# Patient Record
Sex: MALE | Race: WHITE | NOT HISPANIC OR LATINO | Employment: FULL TIME | URBAN - METROPOLITAN AREA
[De-identification: names, ages, dates, MRNs, and addresses within clinical notes are randomized per-mention and may not be internally consistent; named-entity substitution may affect disease eponyms.]

---

## 2017-02-24 ENCOUNTER — OFFICE VISIT (OUTPATIENT)
Dept: FAMILY MEDICINE | Facility: CLINIC | Age: 37
End: 2017-02-24
Payer: COMMERCIAL

## 2017-02-24 VITALS
DIASTOLIC BLOOD PRESSURE: 65 MMHG | WEIGHT: 219 LBS | BODY MASS INDEX: 28.11 KG/M2 | OXYGEN SATURATION: 98 % | TEMPERATURE: 98.2 F | SYSTOLIC BLOOD PRESSURE: 126 MMHG | HEIGHT: 74 IN

## 2017-02-24 DIAGNOSIS — E78.2 MIXED HYPERLIPIDEMIA: Primary | ICD-10-CM

## 2017-02-24 DIAGNOSIS — F41.8 DEPRESSION WITH ANXIETY: ICD-10-CM

## 2017-02-24 PROCEDURE — 80061 LIPID PANEL: CPT | Performed by: FAMILY MEDICINE

## 2017-02-24 PROCEDURE — 99213 OFFICE O/P EST LOW 20 MIN: CPT | Performed by: FAMILY MEDICINE

## 2017-02-24 PROCEDURE — 36415 COLL VENOUS BLD VENIPUNCTURE: CPT | Performed by: FAMILY MEDICINE

## 2017-02-24 PROCEDURE — 84460 ALANINE AMINO (ALT) (SGPT): CPT | Performed by: FAMILY MEDICINE

## 2017-02-24 RX ORDER — SERTRALINE HYDROCHLORIDE 100 MG/1
100 TABLET, FILM COATED ORAL DAILY
Qty: 90 TABLET | Refills: 3 | Status: SHIPPED | OUTPATIENT
Start: 2017-02-24 | End: 2018-03-25

## 2017-02-24 RX ORDER — GEMFIBROZIL 600 MG/1
TABLET, FILM COATED ORAL
Qty: 60 TABLET | Status: CANCELLED | OUTPATIENT
Start: 2017-02-24

## 2017-02-24 ASSESSMENT — ANXIETY QUESTIONNAIRES
7. FEELING AFRAID AS IF SOMETHING AWFUL MIGHT HAPPEN: NOT AT ALL
5. BEING SO RESTLESS THAT IT IS HARD TO SIT STILL: NOT AT ALL
6. BECOMING EASILY ANNOYED OR IRRITABLE: SEVERAL DAYS
2. NOT BEING ABLE TO STOP OR CONTROL WORRYING: SEVERAL DAYS
1. FEELING NERVOUS, ANXIOUS, OR ON EDGE: SEVERAL DAYS

## 2017-02-24 ASSESSMENT — PATIENT HEALTH QUESTIONNAIRE - PHQ9: 5. POOR APPETITE OR OVEREATING: SEVERAL DAYS

## 2017-02-24 NOTE — MR AVS SNAPSHOT
"              After Visit Summary   2/24/2017    Pool Ontiveros    MRN: [id number]           Patient Information     Date Of Birth          1980        Visit Information        Provider Department      2/24/2017 9:20 AM Paco Bonilla MD [facility] [place]        Today's Diagnoses     Mixed hyperlipidemia    -  1    Depression with anxiety           Follow-ups after your visit        Who to contact     If you have questions or need follow up information about today's clinic visit or your schedule please contact [facility] [place] directly at [phone].  Normal or non-critical lab and imaging results will be communicated to you by Sweet Credhart, letter or phone within 4 business days after the clinic has received the results. If you do not hear from us within 7 days, please contact the clinic through SiNode Systemst or phone. If you have a critical or abnormal lab result, we will notify you by phone as soon as possible.  Submit refill requests through ClickTale or call your pharmacy and they will forward the refill request to us. Please allow 3 business days for your refill to be completed.          Additional Information About Your Visit        MyChart Information     ClickTale gives you secure access to your electronic health record. If you see a primary care provider, you can also send messages to your care team and make appointments. If you have questions, please call your primary care clinic.  If you do not have a primary care provider, please call [phone] and they will assist you.        Care EveryWhere ID     This is your Care EveryWhere ID. This could be used by other organizations to access your [place] medical records  [id number]        Your Vitals Were     Temperature Height Pulse Oximetry BMI (Body Mass Index)          98.2  F (36.8  C) (Oral) 6' 2.11\" (1.882 m) 98% 28.03 kg/m2         Blood Pressure from Last 3 Encounters:   02/24/17 126/65   12/06/16 110/70   10/25/16 124/84    " Weight from Last 3 Encounters:   02/24/17 219 lb (99.3 kg)   12/06/16 215 lb (97.5 kg)   10/25/16 215 lb (97.5 kg)              We Performed the Following     ALT     Lipid Profile with reflex to direct LDL          Today's Medication Changes          These changes are accurate as of: 2/24/17  9:50 AM.  If you have any questions, ask your nurse or doctor.               These medicines have changed or have updated prescriptions.        Dose/Directions    sertraline 100 MG tablet   Commonly known as:  ZOLOFT   This may have changed:    - how much to take  - how to take this  - when to take this   Used for:  Depression with anxiety   Changed by:  Paco Bonilla MD        Dose:  100 mg   Take 1 tablet (100 mg) by mouth daily   Quantity:  90 tablet   Refills:  3            Where to get your medicines      These medications were sent to [facility] [id number] [place] [address] [address]     Phone:  [phone]     sertraline 100 MG tablet                Primary Care Provider    None Specified       No primary provider on file.        Thank you!     Thank you for choosing [facility]  for your care. Our goal is always to provide you with excellent care. Hearing back from our patients is one way we can continue to improve our services. Please take a few minutes to complete the written survey that you may receive in the mail after your visit with us. Thank you!             Your Updated Medication List - Protect others around you: Learn how to safely use, store and throw away your medicines at www.disposemymeds.org.          This list is accurate as of: 2/24/17  9:50 AM.  Always use your most recent med list.                   Brand Name Dispense Instructions for use    gemfibrozil 600 MG tablet    LOPID     Reported on 2/24/2017       sertraline 100 MG tablet    ZOLOFT    90 tablet    Take 1 tablet (100 mg) by mouth daily

## 2017-02-24 NOTE — PROGRESS NOTES
SUBJECTIVE:                                                    Pool Ontiveros is a 36 year old male who presents to clinic today for the following health issues:      Medication Followup of  Lopid, zoloft     Taking Medication as prescribed: NO hasn't taken lopid in 8 months     Side Effects:  None    Medication Helping Symptoms:  yes         Problem list and histories reviewed & adjusted, as indicated.  Additional history: as documented    Patient Active Problem List   Diagnosis     History of inguinal hernia repair     History of atrial septal defect repair     Mixed hyperlipidemia     Depression with anxiety     Past Surgical History   Procedure Laterality Date     Hernia repair         Social History   Substance Use Topics     Smoking status: Never Smoker     Smokeless tobacco: Current User     Types: Chew     Alcohol use Yes      Comment: 5 drinks per week     Family History   Problem Relation Age of Onset     Bipolar Disorder Mother      Depression Mother          Current Outpatient Prescriptions   Medication Sig Dispense Refill     sertraline (ZOLOFT) 100 MG tablet Take 1 tablet (100 mg) by mouth daily 90 tablet 3     gemfibrozil (LOPID) 600 MG tablet Reported on 2/24/2017       [DISCONTINUED] sertraline (ZOLOFT) 100 MG tablet        Allergies   Allergen Reactions     Vicodin [Hydrocodone-Acetaminophen] Hives     Recent Labs   Lab Test  07/19/16   0945  06/20/11   1106   LDL  92   --    HDL  59   --    TRIG  208*   --    ALT  29  27.0   CR  0.97  0.9   GFRESTIMATED  88   --    GFRESTBLACK  >90   GFR Calc     --    POTASSIUM  4.0  4.4      BP Readings from Last 3 Encounters:   02/24/17 126/65   12/06/16 110/70   10/25/16 124/84    Wt Readings from Last 3 Encounters:   02/24/17 219 lb (99.3 kg)   12/06/16 215 lb (97.5 kg)   10/25/16 215 lb (97.5 kg)                    ROS:  Constitutional, HEENT, cardiovascular, pulmonary, gi and gu systems are negative, except as otherwise  "noted.    OBJECTIVE:                                                    /65 (BP Location: Right arm, Cuff Size: Adult Regular)  Temp 98.2  F (36.8  C) (Oral)  Ht 6' 2.11\" (1.882 m)  Wt 219 lb (99.3 kg)  SpO2 98%  BMI 28.03 kg/m2  Body mass index is 28.03 kg/(m^2).  GENERAL: healthy, alert and no distress  NECK: no adenopathy, no asymmetry, masses, or scars and thyroid normal to palpation  RESP: lungs clear to auscultation - no rales, rhonchi or wheezes  CV: regular rate and rhythm, normal S1 S2, no S3 or S4, no murmur, click or rub, no peripheral edema and peripheral pulses strong  ABDOMEN: soft, nontender, no hepatosplenomegaly, no masses and bowel sounds normal  MS: no gross musculoskeletal defects noted, no edema         ASSESSMENT/PLAN:                                                    ASSESSMENT / PLAN:  (E78.2) Mixed hyperlipidemia  (primary encounter diagnosis)  Comment: has been off of lopid, not trying life style modification enough, will have him to recheck lab and consider if needed resume lopid. If at borderline between 150 and 250, will have him to decide meds vs life style modification only   Plan: Lipid Profile with reflex to direct LDL, ALT            (F41.8) Depression with anxiety  Comment: stable with current dose of meds, has no HI/SI  Plan: sertraline (ZOLOFT) 100 MG tablet        Will keep watching sx         FUTURE APPOINTMENTS:       - Follow-up visit in 1 year     Paco Bonilla MD  [facility]    "

## 2017-02-25 LAB
ALT SERPL W P-5'-P-CCNC: 28 U/L (ref 0–70)
CHOLEST SERPL-MCNC: 195 MG/DL
HDLC SERPL-MCNC: 43 MG/DL
LDLC SERPL CALC-MCNC: 99 MG/DL
NONHDLC SERPL-MCNC: 152 MG/DL
TRIGL SERPL-MCNC: 264 MG/DL

## 2017-02-25 ASSESSMENT — PATIENT HEALTH QUESTIONNAIRE - PHQ9: SUM OF ALL RESPONSES TO PHQ QUESTIONS 1-9: 5

## 2017-03-10 DIAGNOSIS — Z30.2 ENCOUNTER FOR STERILIZATION: ICD-10-CM

## 2017-03-10 LAB — SEMEN ANALYSIS P VAS PNL: NORMAL

## 2017-03-10 PROCEDURE — 89321 SEMEN ANAL SPERM DETECTION: CPT | Performed by: UROLOGY

## 2017-10-26 ENCOUNTER — OFFICE VISIT (OUTPATIENT)
Dept: FAMILY MEDICINE | Facility: CLINIC | Age: 37
End: 2017-10-26
Payer: COMMERCIAL

## 2017-10-26 VITALS
WEIGHT: 222 LBS | HEIGHT: 74 IN | HEART RATE: 78 BPM | RESPIRATION RATE: 18 BRPM | DIASTOLIC BLOOD PRESSURE: 76 MMHG | TEMPERATURE: 98.6 F | BODY MASS INDEX: 28.49 KG/M2 | OXYGEN SATURATION: 98 % | SYSTOLIC BLOOD PRESSURE: 118 MMHG

## 2017-10-26 DIAGNOSIS — J20.9 ACUTE BRONCHITIS, UNSPECIFIED ORGANISM: Primary | ICD-10-CM

## 2017-10-26 PROCEDURE — 99213 OFFICE O/P EST LOW 20 MIN: CPT | Performed by: PHYSICIAN ASSISTANT

## 2017-10-26 RX ORDER — AZITHROMYCIN 250 MG/1
TABLET, FILM COATED ORAL
Qty: 6 TABLET | Refills: 0 | Status: SHIPPED | OUTPATIENT
Start: 2017-10-26 | End: 2018-02-27

## 2017-10-26 RX ORDER — PREDNISONE 20 MG/1
20 TABLET ORAL 2 TIMES DAILY
Qty: 10 TABLET | Refills: 0 | Status: SHIPPED | OUTPATIENT
Start: 2017-10-26 | End: 2018-02-27

## 2017-10-26 ASSESSMENT — PATIENT HEALTH QUESTIONNAIRE - PHQ9
SUM OF ALL RESPONSES TO PHQ QUESTIONS 1-9: 2
5. POOR APPETITE OR OVEREATING: SEVERAL DAYS

## 2017-10-26 ASSESSMENT — ANXIETY QUESTIONNAIRES
2. NOT BEING ABLE TO STOP OR CONTROL WORRYING: NOT AT ALL
GAD7 TOTAL SCORE: 3
1. FEELING NERVOUS, ANXIOUS, OR ON EDGE: SEVERAL DAYS
7. FEELING AFRAID AS IF SOMETHING AWFUL MIGHT HAPPEN: NOT AT ALL
6. BECOMING EASILY ANNOYED OR IRRITABLE: SEVERAL DAYS
3. WORRYING TOO MUCH ABOUT DIFFERENT THINGS: NOT AT ALL
IF YOU CHECKED OFF ANY PROBLEMS ON THIS QUESTIONNAIRE, HOW DIFFICULT HAVE THESE PROBLEMS MADE IT FOR YOU TO DO YOUR WORK, TAKE CARE OF THINGS AT HOME, OR GET ALONG WITH OTHER PEOPLE: NOT DIFFICULT AT ALL
5. BEING SO RESTLESS THAT IT IS HARD TO SIT STILL: NOT AT ALL

## 2017-10-26 ASSESSMENT — PAIN SCALES - GENERAL: PAINLEVEL: NO PAIN (0)

## 2017-10-26 NOTE — PATIENT INSTRUCTIONS
Fill prescription for zithromax and prednisone If no improvement over the next 3-5 days  Return urgently if any change in symptoms like increasing cough, shortness of breath or other change in symptoms.

## 2017-10-26 NOTE — MR AVS SNAPSHOT
After Visit Summary   10/26/2017    Pool Ontiveros    MRN: [id number]           Patient Information     Date Of Birth          1980        Visit Information        Provider Department      10/26/2017 1:00 PM Valarie Tyler PA-C [facility]        Today's Diagnoses     Acute bronchitis, unspecified organism    -  1      Care Instructions    Fill prescription for zithromax and prednisone If no improvement over the next 3-5 days  Return urgently if any change in symptoms like increasing cough, shortness of breath or other change in symptoms.           Follow-ups after your visit        Who to contact     If you have questions or need follow up information about today's clinic visit or your schedule please contact [facility] directly at [phone].  Normal or non-critical lab and imaging results will be communicated to you by Brainwave Educationhart, letter or phone within 4 business days after the clinic has received the results. If you do not hear from us within 7 days, please contact the clinic through Brainwave Educationhart or phone. If you have a critical or abnormal lab result, we will notify you by phone as soon as possible.  Submit refill requests through News in Shorts or call your pharmacy and they will forward the refill request to us. Please allow 3 business days for your refill to be completed.          Additional Information About Your Visit        MyChart Information     News in Shorts gives you secure access to your electronic health record. If you see a primary care provider, you can also send messages to your care team and make appointments. If you have questions, please call your primary care clinic.  If you do not have a primary care provider, please call [phone] and they will assist you.        Care EveryWhere ID     This is your Care EveryWhere ID. This could be used by other organizations to access your [place] medical records  [id number]        Your Vitals Were      "Pulse Temperature Respirations Height Pulse Oximetry BMI (Body Mass Index)    78 98.6  F (37  C) (Oral) 18 1.886 m (6' 2.25\") 98% 28.31 kg/m2       Blood Pressure from Last 3 Encounters:   10/26/17 118/76   02/24/17 126/65   12/06/16 110/70    Weight from Last 3 Encounters:   10/26/17 100.7 kg (222 lb)   02/24/17 99.3 kg (219 lb)   12/06/16 97.5 kg (215 lb)              Today, you had the following     No orders found for display         Today's Medication Changes          These changes are accurate as of: 10/26/17  1:17 PM.  If you have any questions, ask your nurse or doctor.               Start taking these medicines.        Dose/Directions    azithromycin 250 MG tablet   Commonly known as:  ZITHROMAX   Used for:  Acute bronchitis, unspecified organism   Started by:  Valarie Tyler PA-C        Two tablets first day, then one tablet daily for four days.   Quantity:  6 tablet   Refills:  0       predniSONE 20 MG tablet   Commonly known as:  DELTASONE   Used for:  Acute bronchitis, unspecified organism   Started by:  Valarie Tyler PA-C        Dose:  20 mg   Take 1 tablet (20 mg) by mouth 2 times daily   Quantity:  10 tablet   Refills:  0            Where to get your medicines      These medications were sent to [facility] [address] [place] [address] [address]     Phone:  [phone]     azithromycin 250 MG tablet    predniSONE 20 MG tablet                Primary Care Provider Office Phone # Fax #    Paco Bonilla [phone]       [address] [facility] [address]        Equal Access to Services     [facility] Hospital[facility]ONA AH: Hadii kriss Coppola, waerrolda lusom, qaybta kaalmada ashley otero. So [facility] [phone].    ATENCIÓN: Si habla español, tiene a kelly disposición servicios gratuitos de asistencia lingüística. Llame al [phone].    We comply with applicable federal civil rights " laws and [place] laws. We do not discriminate on the basis of race, color, national origin, age, disability, sex, sexual orientation, or gender identity.            Thank you!     Thank you for choosing [facility]  for your care. Our goal is always to provide you with excellent care. Hearing back from our patients is one way we can continue to improve our services. Please take a few minutes to complete the written survey that you may receive in the mail after your visit with us. Thank you!             Your Updated Medication List - Protect others around you: Learn how to safely use, store and throw away your medicines at www.disposemymeds.org.          This list is accurate as of: 10/26/17  1:17 PM.  Always use your most recent med list.                   Brand Name Dispense Instructions for use Diagnosis    azithromycin 250 MG tablet    ZITHROMAX    6 tablet    Two tablets first day, then one tablet daily for four days.    Acute bronchitis, unspecified organism       gemfibrozil 600 MG tablet    LOPID     Reported on 2/24/2017        predniSONE 20 MG tablet    DELTASONE    10 tablet    Take 1 tablet (20 mg) by mouth 2 times daily    Acute bronchitis, unspecified organism       sertraline 100 MG tablet    ZOLOFT    90 tablet    Take 1 tablet (100 mg) by mouth daily    Depression with anxiety

## 2017-10-26 NOTE — NURSING NOTE
"Chief Complaint   Patient presents with     Cough       Initial /76 (BP Location: Right arm, Patient Position: Chair, Cuff Size: Adult Large)  Pulse 78  Temp 98.6  F (37  C) (Oral)  Resp 18  Ht 1.886 m (6' 2.25\")  Wt 100.7 kg (222 lb)  SpO2 98%  BMI 28.31 kg/m2 Estimated body mass index is 28.31 kg/(m^2) as calculated from the following:    Height as of this encounter: 1.886 m (6' 2.25\").    Weight as of this encounter: 100.7 kg (222 lb).  Medication Reconciliation: complete     Joy Kearns MA       "

## 2017-10-26 NOTE — PROGRESS NOTES
SUBJECTIVE:   Pool Ontiveros is a 37 year old male who presents to clinic today for the following health issues:    Acute Illness   Acute illness concerns: cough  Onset: 2 weeks    Fever: no    Chills/Sweats: no    Headache (location?): YES    Sinus Pressure:YES    Conjunctivitis:  no    Ear Pain: no    Rhinorrhea: no    Congestion: no    Sore Throat: no     Cough: YES-productive of yellow sputum    Wheeze: YES- at night    Decreased Appetite: no    Nausea: no    Vomiting: no    Diarrhea:  no    Dysuria/Freq.: no    Fatigue/Achiness: no    Sick/Strep Exposure: no     Therapies Tried and outcome: robitussin, cough drops          Reports feels fine but cough has persisted for 2 weeks. No fever. No shortness of breath.  Cough productive with yellow sputum. Wheezing at night.     Problem list and histories reviewed & adjusted, as indicated.  Additional history: as documented    Patient Active Problem List   Diagnosis     History of inguinal hernia repair     History of atrial septal defect repair     Mixed hyperlipidemia     Depression with anxiety     Past Surgical History:   Procedure Laterality Date     HERNIA REPAIR         Social History   Substance Use Topics     Smoking status: Never Smoker     Smokeless tobacco: Current User     Types: Chew     Alcohol use Yes      Comment: 5 drinks per week     Family History   Problem Relation Age of Onset     Bipolar Disorder Mother      Depression Mother          Current Outpatient Prescriptions   Medication Sig Dispense Refill                   sertraline (ZOLOFT) 100 MG tablet Take 1 tablet (100 mg) by mouth daily 90 tablet 3     gemfibrozil (LOPID) 600 MG tablet Reported on 2/24/2017           Reviewed and updated as needed this visit by clinical staff     Reviewed and updated as needed this visit by Provider         ROS:  Constitutional, HEENT, cardiovascular, pulmonary, gi and gu systems are negative, except as otherwise noted.      OBJECTIVE:   /76 (BP  "Location: Right arm, Patient Position: Chair, Cuff Size: Adult Large)  Pulse 78  Temp 98.6  F (37  C) (Oral)  Resp 18  Ht 1.886 m (6' 2.25\")  Wt 100.7 kg (222 lb)  SpO2 98%  BMI 28.31 kg/m2  Body mass index is 28.31 kg/(m^2).  GENERAL: healthy, alert and no distress  EYES: Eyes grossly normal to inspection, PERRL and conjunctivae and sclerae normal  HENT: ear canals and TM's normal, nose and mouth without ulcers or lesions  NECK: no adenopathy, no asymmetry, masses, or scars and thyroid normal to palpation  RESP: lungs clear to auscultation - no rales, rhonchi or wheezes  CV: regular rate and rhythm, normal S1 S2, no S3 or S4, no murmur, click or rub, no peripheral edema and peripheral pulses strong  MS: no gross musculoskeletal defects noted, no edema    Diagnostic Test Results:  none     ASSESSMENT/PLAN:             1. Acute bronchitis, unspecified organism    - azithromycin (ZITHROMAX) 250 MG tablet; Two tablets first day, then one tablet daily for four days.  Dispense: 6 tablet; Refill: 0  - predniSONE (DELTASONE) 20 MG tablet; Take 1 tablet (20 mg) by mouth 2 times daily  Dispense: 10 tablet; Refill: 0    Patient Instructions   Fill prescription for zithromax and prednisone If no improvement over the next 3-5 days  Return urgently if any change in symptoms like increasing cough, shortness of breath or other change in symptoms.        Valarie Tyler PA-C  [facility]  "

## 2017-10-27 ASSESSMENT — ANXIETY QUESTIONNAIRES: GAD7 TOTAL SCORE: 3

## 2017-11-06 ENCOUNTER — OFFICE VISIT (OUTPATIENT)
Dept: FAMILY MEDICINE | Facility: CLINIC | Age: 37
End: 2017-11-06
Payer: COMMERCIAL

## 2017-11-06 ENCOUNTER — RADIANT APPOINTMENT (OUTPATIENT)
Dept: GENERAL RADIOLOGY | Facility: CLINIC | Age: 37
End: 2017-11-06
Attending: NURSE PRACTITIONER
Payer: COMMERCIAL

## 2017-11-06 VITALS
TEMPERATURE: 97 F | BODY MASS INDEX: 28.36 KG/M2 | OXYGEN SATURATION: 100 % | WEIGHT: 222.4 LBS | SYSTOLIC BLOOD PRESSURE: 131 MMHG | DIASTOLIC BLOOD PRESSURE: 82 MMHG | HEART RATE: 74 BPM

## 2017-11-06 DIAGNOSIS — M79.641 PAIN OF RIGHT HAND: ICD-10-CM

## 2017-11-06 DIAGNOSIS — M79.641 PAIN OF RIGHT HAND: Primary | ICD-10-CM

## 2017-11-06 DIAGNOSIS — Z23 NEED FOR IMMUNIZATION AGAINST INFLUENZA: ICD-10-CM

## 2017-11-06 PROCEDURE — 99214 OFFICE O/P EST MOD 30 MIN: CPT | Performed by: NURSE PRACTITIONER

## 2017-11-06 PROCEDURE — 73130 X-RAY EXAM OF HAND: CPT | Mod: RT

## 2017-11-06 NOTE — PROGRESS NOTES
SUBJECTIVE:   Pool Ontiveros is a 37 year old male who presents to clinic today for the following health issues:        Musculoskeletal problem/pain      Duration: saturday    Description  Location: right hand    Intensity:  mild, moderate    Accompanying signs and symptoms: swelling    History  Previous similar problem: no   Previous evaluation:  none    Precipitating or alleviating factors:  Trauma or overuse: no   Aggravating factors include: typing, close fist    Therapies tried and outcome: nothing    [place] hunting on Saturday.  Swelling occurred while in bar after hunting.  Denies injury or bug bite.  Swelling was twice the size of what it is now.  Denies redness or warmth.  Endorses some pain still with movement of 3rd finger with typing.  ROM preserved.        Problem list and histories reviewed & adjusted, as indicated.  Additional history: as documented    Patient Active Problem List   Diagnosis     History of inguinal hernia repair     History of atrial septal defect repair     Mixed hyperlipidemia     Depression with anxiety     Past Surgical History:   Procedure Laterality Date     HERNIA REPAIR         Social History   Substance Use Topics     Smoking status: Never Smoker     Smokeless tobacco: Current User     Types: Chew     Alcohol use Yes      Comment: 5 drinks per week     Family History   Problem Relation Age of Onset     Bipolar Disorder Mother      Depression Mother          Current Outpatient Prescriptions   Medication Sig Dispense Refill     sertraline (ZOLOFT) 100 MG tablet Take 1 tablet (100 mg) by mouth daily 90 tablet 3     azithromycin (ZITHROMAX) 250 MG tablet Two tablets first day, then one tablet daily for four days. (Patient not taking: Reported on 11/6/2017) 6 tablet 0     predniSONE (DELTASONE) 20 MG tablet Take 1 tablet (20 mg) by mouth 2 times daily (Patient not taking: Reported on 11/6/2017) 10 tablet 0     gemfibrozil (LOPID) 600 MG tablet Reported on 2/24/2017        Allergies   Allergen Reactions     Vicodin [Hydrocodone-Acetaminophen] Hives         Reviewed and updated as needed this visit by clinical staff     Reviewed and updated as needed this visit by Provider         ROS:  C: NEGATIVE for fever, chills, change in weight  R: NEGATIVE for significant cough or SOB  CV: NEGATIVE for chest pain, palpitations or peripheral edema  MUSCULOSKELETAL: see above  NEURO: NEGATIVE for weakness or parasthesias    OBJECTIVE:     /82 (BP Location: Left arm, Patient Position: Sitting, Cuff Size: Adult Regular)  Pulse 74  Temp 97  F (36.1  C) (Tympanic)  Wt 222 lb 6.4 oz (100.9 kg)  SpO2 100%  BMI 28.36 kg/m2  Body mass index is 28.36 kg/(m^2).  GENERAL: healthy, alert and no distress  HENT: ear canals and TM's normal, nose and mouth without ulcers or lesions  RESP: lungs clear to auscultation - no rales, rhonchi or wheezes  CV: regular rate and rhythm, normal S1 S2, no S3 or S4, no murmur, click or rub, no peripheral edema and peripheral pulses strong  ABDOMEN: soft, nontender, no hepatosplenomegaly, no masses and bowel sounds normal  MS: R hand exam shows normal strength and muscle mass, no erythema, induration, or nodules, no evidence of joint effusion, ROM of all joints is normal and no evidence of joint instability; mild edema on dorsal aspect of hand, mild TTP over base of 2nd metatarsal.  Mild pain with extension of same digit.    Diagnostic Test Results:  No results found for this or any previous visit (from the past 24 hour(s)).  Xray - of right hand normal by my read    ASSESSMENT/PLAN:     1. Pain of right hand  Xray appear normal at visit, will wait final read. See patient instructions for further details.  - XR Hand Right G/E 3 Views; Future    2. Need for immunization against influenza  We were out of flu vaccine today.  Patient to return at his convenience.      Patient Instructions     For the hand:  Xrays look ok but I will have the radiologist take a look as  well.  Take Naproxen 500mg twice a day to decrease inflammation and swelling and help with pain.  Ice twice a day for 20 minutes at a time.  Can wear brace if this provides some comfort.  Return if worsening or not improving.    At [facility], we strive to deliver an exceptional experience to you, every time we see you.  If you receive a survey in the mail, please send us back your thoughts. We really do value your feedback.    Based on your medical history, these are the current health maintenance/preventive care services that you are due for (some may have been done at this visit.)  Health Maintenance Due   Topic Date Due     INFLUENZA VACCINE (SYSTEM ASSIGNED)  09/01/2017         Suggested websites for health information:  Www.Transphorm.org : Up to date and easily searchable information on multiple topics.  Www.medlineplus.gov : medication info, interactive tutorials, watch real surgeries online  Www.familydoctor.org : good info from the Academy of Family Physicians  Www.cdc.gov : public health info, travel advisories, epidemics (H1N1)  Www.aap.org : children's health info, normal development, vaccinations  Www.health.[facility].[place].us : [place] dept of health, public health issues in [place], N1N1    Your care team:                            Family Medicine Internal Medicine   MD Giles Gan MD Shantel Branch-Fleming, MD Katya Georgiev PA-C Nam Ho, MD Pediatrics   NEVAEH Yin, MD Natalia Sifuentes CNP, MD Deborah Mielke, MD Kim Thein, APRN CNP      Clinic hours: Monday - Thursday 7 am-7 pm; Fridays 7 am-5 pm.   Urgent care: Monday - Friday 11 am-9 pm; Saturday and Sunday 9 am-5 pm.  Pharmacy : Monday -Thursday 8 am-8 pm; Friday 8 am-6 pm; Saturday and Sunday 9 am-5 pm.     Clinic: [phone]   Pharmacy: [phone]      R.I.C.E.    R.I.C.E. stands for Rest, Ice, Compression, and Elevation. Doing these  things helps limit pain and swelling after an injury. R.I.C.E. also helps injuries heal faster. Use R.I.C.E. for sprains, strains, and severe bruises or bumps. Follow the tips on this handout and begin R.I.C.E. as soon as possible after an injury.  ? Rest  Pain is your body s way of telling you to rest an injured area. Whether you have hurt an elbow, hand, foot, or knee, limiting its use will prevent further injury and help you heal.  ? Ice  Applying ice right after an injury helps prevent swelling and reduce pain. Don t place ice directly on your skin.    Wrap a cold pack or bag of ice in a thin cloth. Place it over the injured area.    Ice for 10 minutes every 3 hours. Don t ice for more than 20 minutes at a time.  ? Compression  Putting pressure (compression) on an injury helps prevent swelling and provides support.    Wrap the injured area firmly with an elastic bandage. If your hand or foot tingles, becomes discolored, or feels cold to the touch, the bandage may be too tight. Rewrap it more loosely.    If your bandage becomes too loose, rewrap it.    Do not wear an elastic bandage overnight.  ? Elevation  Keeping an injury elevated helps reduce swelling, pain, and throbbing. Elevation is most effective when the injury is kept elevated higher than the heart.     Call your healthcare provider if you notice any of the following:    Fingers or toes feel numb, are cold to the touch, or change color    Skin looks shiny or tight    Pain, swelling, or bruising worsens and is not improved with elevation   Date Last Reviewed: 9/3/2015    8491-2717 [employer]. [address] [place] [address]. All rights reserved. This information is not intended as a substitute for professional medical care. Always follow your healthcare professional's instructions.            [facility]

## 2017-11-06 NOTE — PATIENT INSTRUCTIONS
For the hand:  Xrays look ok but I will have the radiologist take a look as well.  Take Naproxen 500mg twice a day to decrease inflammation and swelling and help with pain.  Ice twice a day for 20 minutes at a time.  Can wear brace if this provides some comfort.  Return if worsening or not improving.    At [facility], we strive to deliver an exceptional experience to you, every time we see you.  If you receive a survey in the mail, please send us back your thoughts. We really do value your feedback.    Based on your medical history, these are the current health maintenance/preventive care services that you are due for (some may have been done at this visit.)  Health Maintenance Due   Topic Date Due     INFLUENZA VACCINE (SYSTEM ASSIGNED)  09/01/2017         Suggested websites for health information:  Www.PreApps.AOL : Up to date and easily searchable information on multiple topics.  Www.[employer].gov : medication info, interactive tutorials, watch real surgeries online  Www.familydoctor.org : good info from the Academy of Family Physicians  Www.cdc.gov : public health info, travel advisories, epidemics (H1N1)  Www.aap.org : children's health info, normal development, vaccinations  Www.health.[facility].[place].us : [place] dept of health, public health issues in [place], N1N1    Your care team:                            Family Medicine Internal Medicine   MD Giles Gan MD Shantel Branch-Fleming, MD Katya Georgiev PA-C Nam Ho, MD Pediatrics   NEVAEH Yin, MD Natalia Sifuentes CNP, MD Deborah Mielke, MD Kim Thein, APRN CNP      Clinic hours: Monday - Thursday 7 am-7 pm; Fridays 7 am-5 pm.   Urgent care: Monday - Friday 11 am-9 pm; Saturday and Sunday 9 am-5 pm.  Pharmacy : Monday -Thursday 8 am-8 pm; Friday 8 am-6 pm; Saturday and Sunday 9 am-5 pm.     Clinic: [phone]   Pharmacy: [phone]      R.I.C.E.    R.I.C.E. stands for Rest, Ice, Compression, and Elevation. Doing these things helps limit pain and swelling after an injury. R.I.C.E. also helps injuries heal faster. Use R.I.C.E. for sprains, strains, and severe bruises or bumps. Follow the tips on this handout and begin R.I.C.E. as soon as possible after an injury.  ? Rest  Pain is your body s way of telling you to rest an injured area. Whether you have hurt an elbow, hand, foot, or knee, limiting its use will prevent further injury and help you heal.  ? Ice  Applying ice right after an injury helps prevent swelling and reduce pain. Don t place ice directly on your skin.    Wrap a cold pack or bag of ice in a thin cloth. Place it over the injured area.    Ice for 10 minutes every 3 hours. Don t ice for more than 20 minutes at a time.  ? Compression  Putting pressure (compression) on an injury helps prevent swelling and provides support.    Wrap the injured area firmly with an elastic bandage. If your hand or foot tingles, becomes discolored, or feels cold to the touch, the bandage may be too tight. Rewrap it more loosely.    If your bandage becomes too loose, rewrap it.    Do not wear an elastic bandage overnight.  ? Elevation  Keeping an injury elevated helps reduce swelling, pain, and throbbing. Elevation is most effective when the injury is kept elevated higher than the heart.     Call your healthcare provider if you notice any of the following:    Fingers or toes feel numb, are cold to the touch, or change color    Skin looks shiny or tight    Pain, swelling, or bruising worsens and is not improved with elevation   Date Last Reviewed: 9/3/2015    4914-5772 [employer]. [address] [place] [address], [place] [address]. All rights reserved. This information is not intended as a substitute for professional medical care. Always follow your healthcare professional's instructions.

## 2017-11-06 NOTE — NURSING NOTE
"Chief Complaint   Patient presents with     Musculoskeletal Problem      Right hand       Initial /82 (BP Location: Left arm, Patient Position: Sitting, Cuff Size: Adult Regular)  Pulse 74  Temp 97  F (36.1  C) (Tympanic)  Wt 222 lb 6.4 oz (100.9 kg)  SpO2 100%  BMI 28.36 kg/m2 Estimated body mass index is 28.36 kg/(m^2) as calculated from the following:    Height as of 10/26/17: 6' 2.25\" (1.886 m).    Weight as of this encounter: 222 lb 6.4 oz (100.9 kg).  Medication Reconciliation: complete   Parish MACIEL      "

## 2017-11-06 NOTE — MR AVS SNAPSHOT
After Visit Summary   11/6/2017    Pool Ontiveros    MRN: [id number]           Patient Information     Date Of Birth          1980        Visit Information        Provider Department      11/6/2017 11:20 AM Julienne Jiang APRN CNP [facility]        Today's Diagnoses     Pain of right hand    -  1      Care Instructions    For the hand:  Xrays look ok but I will have the radiologist take a look as well.  Take Naproxen 500mg twice a day to decrease inflammation and swelling and help with pain.  Ice twice a day for 20 minutes at a time.  Can wear brace if this provides some comfort.  Return if worsening or not improving.    At [facility], we strive to deliver an exceptional experience to you, every time we see you.  If you receive a survey in the mail, please send us back your thoughts. We really do value your feedback.    Based on your medical history, these are the current health maintenance/preventive care services that you are due for (some may have been done at this visit.)  Health Maintenance Due   Topic Date Due     INFLUENZA VACCINE (SYSTEM ASSIGNED)  09/01/2017         Suggested websites for health information:  Www.digedu.[employer] : Up to date and easily searchable information on multiple topics.  Www.medlineplus.gov : medication info, interactive tutorials, watch real surgeries online  Www.familydoctor.org : good info from the Academy of Family Physicians  Www.cdc.gov : public health info, travel advisories, epidemics (H1N1)  Www.aap.org : children's health info, normal development, vaccinations  Www.health.state[place].us : [place] dept of health, public health issues in [place], N1N1    Your care team:                            Family Medicine Internal Medicine   MD Giles Gan MD Shantel Branch-Fleming, MD Katya Georgiev PA-C Nam Ho, MD Pediatrics   NEVAEH Yin CNP Amelia Massimini APRN CNP    MD Natalia Brock MD Deborah Mielke, MD Kim Thein, APRN CNP      Clinic hours: Monday - Thursday 7 am-7 pm; Fridays 7 am-5 pm.   Urgent care: Monday - Friday 11 am-9 pm; Saturday and Sunday 9 am-5 pm.  Pharmacy : Monday -Thursday 8 am-8 pm; Friday 8 am-6 pm; Saturday and Sunday 9 am-5 pm.     Clinic: [phone]   Pharmacy: [phone]      R.I.C.E.    R.I.C.E. stands for Rest, Ice, Compression, and Elevation. Doing these things helps limit pain and swelling after an injury. R.I.C.E. also helps injuries heal faster. Use R.I.C.E. for sprains, strains, and severe bruises or bumps. Follow the tips on this handout and begin R.I.C.E. as soon as possible after an injury.  ? Rest  Pain is your body s way of telling you to rest an injured area. Whether you have hurt an elbow, hand, foot, or knee, limiting its use will prevent further injury and help you heal.  ? Ice  Applying ice right after an injury helps prevent swelling and reduce pain. Don t place ice directly on your skin.    Wrap a cold pack or bag of ice in a thin cloth. Place it over the injured area.    Ice for 10 minutes every 3 hours. Don t ice for more than 20 minutes at a time.  ? Compression  Putting pressure (compression) on an injury helps prevent swelling and provides support.    Wrap the injured area firmly with an elastic bandage. If your hand or foot tingles, becomes discolored, or feels cold to the touch, the bandage may be too tight. Rewrap it more loosely.    If your bandage becomes too loose, rewrap it.    Do not wear an elastic bandage overnight.  ? Elevation  Keeping an injury elevated helps reduce swelling, pain, and throbbing. Elevation is most effective when the injury is kept elevated higher than the heart.     Call your healthcare provider if you notice any of the following:    Fingers or toes feel numb, are cold to the touch, or change color    Skin looks shiny or tight    Pain, swelling, or bruising worsens and  is not improved with elevation   Date Last Reviewed: 9/3/2015    6316-3285 [employer]. [address] [place] [address], [place] [address]. All rights reserved. This information is not intended as a substitute for professional medical care. Always follow your healthcare professional's instructions.                Follow-ups after your visit        Who to contact     If you have questions or need follow up information about today's clinic visit or your schedule please contact [facility] directly at [phone].  Normal or non-critical lab and imaging results will be communicated to you by Jamnhart, letter or phone within 4 business days after the clinic has received the results. If you do not hear from us within 7 days, please contact the clinic through [employer] or phone. If you have a critical or abnormal lab result, we will notify you by phone as soon as possible.  Submit refill requests through [employer] or call your pharmacy and they will forward the refill request to us. Please allow 3 business days for your refill to be completed.          Additional Information About Your Visit        Jamnhart Information     [employer] gives you secure access to your electronic health record. If you see a primary care provider, you can also send messages to your care team and make appointments. If you have questions, please call your primary care clinic.  If you do not have a primary care provider, please call [phone] and they will assist you.        Care EveryWhere ID     This is your Care EveryWhere ID. This could be used by other organizations to access your [facility] medical records  [id number]        Your Vitals Were     Pulse Temperature Pulse Oximetry BMI (Body Mass Index)          74 97  F (36.1  C) (Tympanic) 100% 28.36 kg/m2         Blood Pressure from Last 3 Encounters:   11/06/17 131/82   10/26/17 118/76   02/24/17 126/65    Weight from Last 3 Encounters:   11/06/17 222 lb 6.4 oz (100.9 kg)    10/26/17 222 lb (100.7 kg)   02/24/17 219 lb (99.3 kg)               Primary Care Provider Office Phone # Fax #    [place] [phone]       [address] [facility] [address]        Equal Access to Services     YARA TREVER : Hadii aad ku hadasho Soomaali, waaxda luqadaha, qaybta kaalmada adeegyada, waxay idiin hayaan aderadha reyezlewiswaqas ladayanan ah. So [facility] [phone].    ATENCIÓN: Si habla español, tiene a kelly disposición servicios gratuitos de asistencia lingüística. Llame al [phone].    We comply with applicable federal civil rights laws and [place] laws. We do not discriminate on the basis of race, color, national origin, age, disability, sex, sexual orientation, or gender identity.            Thank you!     Thank you for choosing [facility]  for your care. Our goal is always to provide you with excellent care. Hearing back from our patients is one way we can continue to improve our services. Please take a few minutes to complete the written survey that you may receive in the mail after your visit with us. Thank you!             Your Updated Medication List - Protect others around you: Learn how to safely use, store and throw away your medicines at www.disposemymeds.org.          This list is accurate as of: 11/6/17 12:08 PM.  Always use your most recent med list.                   Brand Name Dispense Instructions for use Diagnosis    azithromycin 250 MG tablet    ZITHROMAX    6 tablet    Two tablets first day, then one tablet daily for four days.    Acute bronchitis, unspecified organism       gemfibrozil 600 MG tablet    LOPID     Reported on 2/24/2017        predniSONE 20 MG tablet    DELTASONE    10 tablet    Take 1 tablet (20 mg) by mouth 2 times daily    Acute bronchitis, unspecified organism       sertraline 100 MG tablet    ZOLOFT    90 tablet    Take 1 tablet (100 mg) by mouth daily    Depression with anxiety

## 2017-11-07 ENCOUNTER — TELEPHONE (OUTPATIENT)
Dept: FAMILY MEDICINE | Facility: CLINIC | Age: 37
End: 2017-11-07

## 2017-11-07 NOTE — TELEPHONE ENCOUNTER
This writer attempted to contact Pool on 11/07/17      Reason for call results and left detailed message.      If patient calls back:   Relay message [id number] is normal, (read verbatim), document that pt called and close encounter.        Parish Pabon, CMA

## 2018-02-27 ENCOUNTER — OFFICE VISIT (OUTPATIENT)
Dept: FAMILY MEDICINE | Facility: CLINIC | Age: 38
End: 2018-02-27
Payer: COMMERCIAL

## 2018-02-27 VITALS
SYSTOLIC BLOOD PRESSURE: 104 MMHG | OXYGEN SATURATION: 99 % | TEMPERATURE: 97.1 F | HEIGHT: 74 IN | BODY MASS INDEX: 27.46 KG/M2 | RESPIRATION RATE: 16 BRPM | WEIGHT: 214 LBS | DIASTOLIC BLOOD PRESSURE: 69 MMHG | HEART RATE: 69 BPM

## 2018-02-27 DIAGNOSIS — G47.33 OSA (OBSTRUCTIVE SLEEP APNEA): ICD-10-CM

## 2018-02-27 DIAGNOSIS — Z00.00 HEALTH CARE MAINTENANCE: Primary | ICD-10-CM

## 2018-02-27 LAB
ERYTHROCYTE [DISTWIDTH] IN BLOOD BY AUTOMATED COUNT: 12.8 % (ref 10–15)
HCT VFR BLD AUTO: 47.8 % (ref 40–53)
HGB BLD-MCNC: 15.7 G/DL (ref 13.3–17.7)
MCH RBC QN AUTO: 30.8 PG (ref 26.5–33)
MCHC RBC AUTO-ENTMCNC: 32.8 G/DL (ref 31.5–36.5)
MCV RBC AUTO: 94 FL (ref 78–100)
PLATELET # BLD AUTO: 188 10E9/L (ref 150–450)
RBC # BLD AUTO: 5.1 10E12/L (ref 4.4–5.9)
WBC # BLD AUTO: 4.8 10E9/L (ref 4–11)

## 2018-02-27 PROCEDURE — 99212 OFFICE O/P EST SF 10 MIN: CPT | Mod: 25 | Performed by: FAMILY MEDICINE

## 2018-02-27 PROCEDURE — 85027 COMPLETE CBC AUTOMATED: CPT | Performed by: FAMILY MEDICINE

## 2018-02-27 PROCEDURE — 99395 PREV VISIT EST AGE 18-39: CPT | Performed by: FAMILY MEDICINE

## 2018-02-27 PROCEDURE — 84443 ASSAY THYROID STIM HORMONE: CPT | Performed by: FAMILY MEDICINE

## 2018-02-27 PROCEDURE — 84460 ALANINE AMINO (ALT) (SGPT): CPT | Performed by: FAMILY MEDICINE

## 2018-02-27 PROCEDURE — 80061 LIPID PANEL: CPT | Performed by: FAMILY MEDICINE

## 2018-02-27 PROCEDURE — 36415 COLL VENOUS BLD VENIPUNCTURE: CPT | Performed by: FAMILY MEDICINE

## 2018-02-27 PROCEDURE — 80048 BASIC METABOLIC PNL TOTAL CA: CPT | Performed by: FAMILY MEDICINE

## 2018-02-27 NOTE — NURSING NOTE
"Chief Complaint   Patient presents with     Physical       Initial /69 (Cuff Size: Adult Large)  Pulse 69  Temp 97.1  F (36.2  C) (Tympanic)  Resp 16  Ht 6' 2.25\" (1.886 m)  Wt 214 lb (97.1 kg)  SpO2 99%  BMI 27.29 kg/m2 Estimated body mass index is 27.29 kg/(m^2) as calculated from the following:    Height as of this encounter: 6' 2.25\" (1.886 m).    Weight as of this encounter: 214 lb (97.1 kg).  Medication Reconciliation: complete   Janneth Thapa, CMA    "

## 2018-02-27 NOTE — MR AVS SNAPSHOT
After Visit Summary   2/27/2018    Pool Ontiveros    MRN: [id number]           Patient Information     Date Of Birth          1980        Visit Information        Provider Department      2/27/2018 10:20 AM Paco Bonilla MD [facility] [place]        Today's Diagnoses     Health care maintenance    -  1    TATA (obstructive sleep apnea)          Care Instructions      Preventive Health Recommendations  Male Ages 26 - 39    Yearly exam:             See your health care provider every year in order to  o   Review health changes.   o   Discuss preventive care.    o   Review your medicines if your doctor has prescribed any.    You should be tested each year for STDs (sexually transmitted diseases), if you re at risk.     After age 35, talk to your provider about cholesterol testing. If you are at risk for heart disease, have your cholesterol tested at least every 5 years.     If you are at risk for diabetes, you should have a diabetes test (fasting glucose).  Shots: Get a flu shot each year. Get a tetanus shot every 10 years.     Nutrition:    Eat at least 5 servings of fruits and vegetables daily.     Eat whole-grain bread, whole-wheat pasta and brown rice instead of white grains and rice.     Talk to your provider about Calcium and Vitamin D.     Lifestyle    Exercise for at least 150 minutes a week (30 minutes a day, 5 days a week). This will help you control your weight and prevent disease.     Limit alcohol to one drink per day.     No smoking.     Wear sunscreen to prevent skin cancer.     See your dentist every six months for an exam and cleaning.       Preventive Health Recommendations  Male Ages 26 - 39    Yearly exam:             See your health care provider every year in order to  o   Review health changes.   o   Discuss preventive care.    o   Review your medicines if your doctor has prescribed any.    You should be tested each year for STDs (sexually transmitted diseases),  if you re at risk.     After age 35, talk to your provider about cholesterol testing. If you are at risk for heart disease, have your cholesterol tested at least every 5 years.     If you are at risk for diabetes, you should have a diabetes test (fasting glucose).  Shots: Get a flu shot each year. Get a tetanus shot every 10 years.     Nutrition:    Eat at least 5 servings of fruits and vegetables daily.     Eat whole-grain bread, whole-wheat pasta and brown rice instead of white grains and rice.     Talk to your provider about Calcium and Vitamin D.     Lifestyle    Exercise for at least 150 minutes a week (30 minutes a day, 5 days a week). This will help you control your weight and prevent disease.     Limit alcohol to one drink per day.     No smoking.     Wear sunscreen to prevent skin cancer.     See your dentist every six months for an exam and cleaning.             Follow-ups after your visit        Additional Services     SLEEP EVALUATION & MANAGEMENT REFERRAL - [facility] [phone]  (Age 18 and up)       Please be aware that coverage of these services is subject to the terms and limitations of your health insurance plan.  Call member services at your health plan with any benefit or coverage questions.      Please bring the following to your appointment:    >>   List of current medications   >>   This referral request   >>   Any documents/labs given to you for this referral                      Follow-up notes from your care team     Return in about 1 year (around 2/27/2019) for Physical Exam.      Future tests that were ordered for you today     Open Future Orders        Priority Expected Expires Ordered    SLEEP EVALUATION & MANAGEMENT REFERRAL - [facility] [phone]  (Age 18 and up) Routine  2/27/2019 2/27/2018            Who to contact     If you have questions or need follow up information about today's clinic visit or your schedule  "please contact [facility] directly at [phone].  Normal or non-critical lab and imaging results will be communicated to you by MyChart, letter or phone within 4 business days after the clinic has received the results. If you do not hear from us within 7 days, please contact the clinic through [employer] or phone. If you have a critical or abnormal lab result, we will notify you by phone as soon as possible.  Submit refill requests through [employer] or call your pharmacy and they will forward the refill request to us. Please allow 3 business days for your refill to be completed.          Additional Information About Your Visit        [employer] Interactive Information     [employer] gives you secure access to your electronic health record. If you see a primary care provider, you can also send messages to your care team and make appointments. If you have questions, please call your primary care clinic.  If you do not have a primary care provider, please call [phone] and they will assist you.        Care EveryWhere ID     This is your Care EveryWhere ID. This could be used by other organizations to access your [place] medical records  [id number]        Your Vitals Were     Pulse Temperature Respirations Height Pulse Oximetry BMI (Body Mass Index)    69 97.1  F (36.2  C) (Tympanic) 16 6' 2.25\" (1.886 m) 99% 27.29 kg/m2       Blood Pressure from Last 3 Encounters:   02/27/18 104/69   11/06/17 131/82   10/26/17 118/76    Weight from Last 3 Encounters:   02/27/18 214 lb (97.1 kg)   11/06/17 222 lb 6.4 oz (100.9 kg)   10/26/17 222 lb (100.7 kg)              We Performed the Following     ALT     Basic metabolic panel  (Ca, Cl, CO2, Creat, Gluc, K, Na, BUN)     CBC with platelets     Lipid panel reflex to direct LDL Fasting     OFFICE/OUTPT VISIT,EST,LEVL II     TSH with free T4 reflex        Primary Care Provider Office Phone # Fax #    Paco Bonilla [phone]       [address] [place] [address] " [place] [address]        Equal Access to Services     [facility] : Hadii kriss bautista hadstefanijudy J Luisali, waerrolda luqadaha, qajeannineta kaalmamino otero, ashley dubon. So [facility] [phone].    ATENCIÓN: Si habla español, tiene a kelly disposición servicios gratuitos de asistencia lingüística. Llame al [phone].    We comply with applicable federal civil rights laws and [place] laws. We do not discriminate on the basis of race, color, national origin, age, disability, sex, sexual orientation, or gender identity.            Thank you!     Thank you for choosing [facility] [place]  for your care. Our goal is always to provide you with excellent care. Hearing back from our patients is one way we can continue to improve our services. Please take a few minutes to complete the written survey that you may receive in the mail after your visit with us. Thank you!             Your Updated Medication List - Protect others around you: Learn how to safely use, store and throw away your medicines at www.disposemymeds.org.          This list is accurate as of 2/27/18 10:43 AM.  Always use your most recent med list.                   Brand Name Dispense Instructions for use Diagnosis    gemfibrozil 600 MG tablet    LOPID     Reported on 2/24/2017        sertraline 100 MG tablet    ZOLOFT    90 tablet    Take 1 tablet (100 mg) by mouth daily    Depression with anxiety

## 2018-02-27 NOTE — PROGRESS NOTES
SUBJECTIVE:   CC: Pool Ontiveros is an 37 year old male who presents for preventative health visit.     Healthy Habits:    Do you get at least three servings of calcium containing foods daily (dairy, green leafy vegetables, etc.)? yes    Amount of exercise or daily activities, outside of work: 3 day(s) per week    Problems taking medications regularly No    Medication side effects: No    Have you had an eye exam in the past two years? no    Do you see a dentist twice per year? no    Do you have sleep apnea, excessive snoring or daytime drowsiness? Yes      Additional problems: fatigue, tingling in arms and leg     Today's PHQ-2 Score:   PHQ-2 ( 1999 Pfizer) 10/26/2017 2/24/2017   Q1: Little interest or pleasure in doing things 0 0   Q2: Feeling down, depressed or hopeless 0 0   PHQ-2 Score 0 0       Abuse: Current or Past(Physical, Sexual or Emotional)- No  Do you feel safe in your environment - Yes    Social History   Substance Use Topics     Smoking status: Never Smoker     Smokeless tobacco: Current User     Types: Chew     Alcohol use Yes      Comment: 5 drinks per week      If you drink alcohol do you typically have >3 drinks per day or >7 drinks per week? No                      Last PSA: No results found for: PSA    Reviewed orders with patient. Reviewed health maintenance and updated orders accordingly - Yes  BP Readings from Last 3 Encounters:   02/27/18 104/69   11/06/17 131/82   10/26/17 118/76    Wt Readings from Last 3 Encounters:   02/27/18 214 lb (97.1 kg)   11/06/17 222 lb 6.4 oz (100.9 kg)   10/26/17 222 lb (100.7 kg)                  Patient Active Problem List   Diagnosis     History of inguinal hernia repair     History of atrial septal defect repair     Mixed hyperlipidemia     Depression with anxiety     Past Surgical History:   Procedure Laterality Date     HERNIA REPAIR         Social History   Substance Use Topics     Smoking status: Never Smoker     Smokeless tobacco: Current User      Types: Chew      Comment: Gum, ocasionally      Alcohol use Yes      Comment: 5 drinks per week     Family History   Problem Relation Age of Onset     Bipolar Disorder Mother      Depression Mother          Current Outpatient Prescriptions   Medication Sig Dispense Refill     sertraline (ZOLOFT) 100 MG tablet Take 1 tablet (100 mg) by mouth daily 90 tablet 3     gemfibrozil (LOPID) 600 MG tablet Reported on 2/24/2017       Allergies   Allergen Reactions     Vicodin [Hydrocodone-Acetaminophen] Hives     Recent Labs   Lab Test  02/24/17   0947  07/19/16   0945  06/20/11   1106   LDL  99  92   --    HDL  43  59   --    TRIG  264*  208*   --    ALT  28  29  27.0   CR   --   0.97  0.9   GFRESTIMATED   --   88   --    GFRESTBLACK   --   >90   GFR Calc     --    POTASSIUM   --   4.0  4.4        Reviewed and updated as needed this visit by clinical staff         Reviewed and updated as needed this visit by Provider        Past Medical History:   Diagnosis Date     Depression with anxiety 7/19/2016     Hernia, abdominal      History of atrial septal defect repair 3/5/2012    Overview:  repaired age 5      History of inguinal hernia repair 3/5/2012    Overview:  Age 6      Mixed hyperlipidemia 7/19/2016      Past Surgical History:   Procedure Laterality Date     HERNIA REPAIR         ROS:  C: NEGATIVE for fever, chills, change in weight  I: NEGATIVE for worrisome rashes, moles or lesions  E: NEGATIVE for vision changes or irritation  ENT: NEGATIVE for ear, mouth and throat problems  R: NEGATIVE for significant cough or SOB  CV: NEGATIVE for chest pain, palpitations or peripheral edema  GI: NEGATIVE for nausea, abdominal pain, heartburn, or change in bowel habits   male: negative for dysuria, hematuria, decreased urinary stream, erectile dysfunction, urethral discharge  M: NEGATIVE for significant arthralgias or myalgia  N: NEGATIVE for weakness, dizziness or paresthesias  P: NEGATIVE for changes in mood  "or affect    OBJECTIVE:   /69 (Cuff Size: Adult Large)  Pulse 69  Temp 97.1  F (36.2  C) (Tympanic)  Resp 16  Ht 6' 2.25\" (1.886 m)  Wt 214 lb (97.1 kg)  SpO2 99%  BMI 27.29 kg/m2  EXAM:  GENERAL: healthy, alert and no distress  EYES: Eyes grossly normal to inspection, PERRL and conjunctivae and sclerae normal  HENT: ear canals and TM's normal, nose and mouth without ulcers or lesions  NECK: no adenopathy, no asymmetry, masses, or scars and thyroid normal to palpation  RESP: lungs clear to auscultation - no rales, rhonchi or wheezes  CV: regular rate and rhythm, normal S1 S2, no S3 or S4, no murmur, click or rub, no peripheral edema and peripheral pulses strong  ABDOMEN: soft, nontender, no hepatosplenomegaly, no masses and bowel sounds normal   (male): normal male genitalia without lesions or urethral discharge, no hernia  MS: no gross musculoskeletal defects noted, no edema  SKIN: no suspicious lesions or rashes  NEURO: Normal strength and tone, mentation intact and speech normal  BACK: no CVA tenderness, no paralumbar tenderness  PSYCH: mentation appears normal, affect normal/bright  LYMPH: no cervical, supraclavicular, axillary, or inguinal adenopathy    ASSESSMENT/PLAN:   1. Health care maintenance    - CBC with platelets  - Basic metabolic panel  (Ca, Cl, CO2, Creat, Gluc, K, Na, BUN)  - ALT  - Lipid panel reflex to direct LDL Fasting  - TSH with free T4 reflex    2. TATA (obstructive sleep apnea)  Present daytime sleepiness, and tingling and muscle in the morning   Has worsening snoring per family member  Will have him to see sleep clinic for further evaluation   - SLEEP EVALUATION & MANAGEMENT REFERRAL - [facility] [phone]  (Age 18 and up); Future  - OFFICE/OUTPT VISIT,EST,LEVL II    COUNSELING:  Reviewed preventive health counseling, as reflected in patient instructions       reports that he has never smoked. His smokeless tobacco use includes " "Chew.    Estimated body mass index is 28.36 kg/(m^2) as calculated from the following:    Height as of 10/26/17: 6' 2.25\" (1.886 m).    Weight as of 11/6/17: 222 lb 6.4 oz (100.9 kg).       Counseling Resources:  ATP IV Guidelines  Pooled Cohorts Equation Calculator  FRAX Risk Assessment  ICSI Preventive Guidelines  Dietary Guidelines for Americans, 2010  USDA's MyPlate  ASA Prophylaxis  Lung CA Screening    Paco Bonilla MD  [facility]  "

## 2018-02-27 NOTE — PATIENT INSTRUCTIONS
Preventive Health Recommendations  Male Ages 26 - 39    Yearly exam:             See your health care provider every year in order to  o   Review health changes.   o   Discuss preventive care.    o   Review your medicines if your doctor has prescribed any.    You should be tested each year for STDs (sexually transmitted diseases), if you re at risk.     After age 35, talk to your provider about cholesterol testing. If you are at risk for heart disease, have your cholesterol tested at least every 5 years.     If you are at risk for diabetes, you should have a diabetes test (fasting glucose).  Shots: Get a flu shot each year. Get a tetanus shot every 10 years.     Nutrition:    Eat at least 5 servings of fruits and vegetables daily.     Eat whole-grain bread, whole-wheat pasta and brown rice instead of white grains and rice.     Talk to your provider about Calcium and Vitamin D.     Lifestyle    Exercise for at least 150 minutes a week (30 minutes a day, 5 days a week). This will help you control your weight and prevent disease.     Limit alcohol to one drink per day.     No smoking.     Wear sunscreen to prevent skin cancer.     See your dentist every six months for an exam and cleaning.       Preventive Health Recommendations  Male Ages 26 - 39    Yearly exam:             See your health care provider every year in order to  o   Review health changes.   o   Discuss preventive care.    o   Review your medicines if your doctor has prescribed any.    You should be tested each year for STDs (sexually transmitted diseases), if you re at risk.     After age 35, talk to your provider about cholesterol testing. If you are at risk for heart disease, have your cholesterol tested at least every 5 years.     If you are at risk for diabetes, you should have a diabetes test (fasting glucose).  Shots: Get a flu shot each year. Get a tetanus shot every 10 years.     Nutrition:    Eat at least 5 servings of fruits and vegetables  daily.     Eat whole-grain bread, whole-wheat pasta and brown rice instead of white grains and rice.     Talk to your provider about Calcium and Vitamin D.     Lifestyle    Exercise for at least 150 minutes a week (30 minutes a day, 5 days a week). This will help you control your weight and prevent disease.     Limit alcohol to one drink per day.     No smoking.     Wear sunscreen to prevent skin cancer.     See your dentist every six months for an exam and cleaning.

## 2018-02-28 LAB
ALT SERPL W P-5'-P-CCNC: 24 U/L (ref 0–70)
ANION GAP SERPL CALCULATED.3IONS-SCNC: 8 MMOL/L (ref 3–14)
BUN SERPL-MCNC: 16 MG/DL (ref 7–30)
CALCIUM SERPL-MCNC: 9.2 MG/DL (ref 8.5–10.1)
CHLORIDE SERPL-SCNC: 109 MMOL/L (ref 94–109)
CHOLEST SERPL-MCNC: 183 MG/DL
CO2 SERPL-SCNC: 23 MMOL/L (ref 20–32)
CREAT SERPL-MCNC: 1.04 MG/DL (ref 0.66–1.25)
GFR SERPL CREATININE-BSD FRML MDRD: 80 ML/MIN/1.7M2
GLUCOSE SERPL-MCNC: 90 MG/DL (ref 70–99)
HDLC SERPL-MCNC: 47 MG/DL
LDLC SERPL CALC-MCNC: 81 MG/DL
NONHDLC SERPL-MCNC: 136 MG/DL
POTASSIUM SERPL-SCNC: 4.3 MMOL/L (ref 3.4–5.3)
SODIUM SERPL-SCNC: 140 MMOL/L (ref 133–144)
TRIGL SERPL-MCNC: 273 MG/DL
TSH SERPL DL<=0.005 MIU/L-ACNC: 1.35 MU/L (ref 0.4–4)

## 2018-03-08 ENCOUNTER — OFFICE VISIT (OUTPATIENT)
Dept: SLEEP MEDICINE | Facility: CLINIC | Age: 38
End: 2018-03-08
Attending: FAMILY MEDICINE
Payer: COMMERCIAL

## 2018-03-08 VITALS
OXYGEN SATURATION: 99 % | HEIGHT: 75 IN | HEART RATE: 88 BPM | DIASTOLIC BLOOD PRESSURE: 80 MMHG | SYSTOLIC BLOOD PRESSURE: 126 MMHG | WEIGHT: 215.2 LBS | BODY MASS INDEX: 26.76 KG/M2 | RESPIRATION RATE: 18 BRPM

## 2018-03-08 DIAGNOSIS — G47.33 OSA (OBSTRUCTIVE SLEEP APNEA): ICD-10-CM

## 2018-03-08 DIAGNOSIS — G25.81 RESTLESS LEGS SYNDROME (RLS): Primary | ICD-10-CM

## 2018-03-08 DIAGNOSIS — G47.19 EXCESSIVE DAYTIME SLEEPINESS: ICD-10-CM

## 2018-03-08 PROCEDURE — 99204 OFFICE O/P NEW MOD 45 MIN: CPT | Performed by: INTERNAL MEDICINE

## 2018-03-08 RX ORDER — PRAMIPEXOLE DIHYDROCHLORIDE 0.25 MG/1
.25-.5 TABLET ORAL AT BEDTIME
Qty: 60 TABLET | Refills: 2 | Status: SHIPPED | OUTPATIENT
Start: 2018-03-08 | End: 2019-05-03

## 2018-03-08 NOTE — NURSING NOTE
"Chief Complaint   Patient presents with     Sleep Problem       Initial /79  Pulse 88  Resp 18  Ht 1.892 m (6' 2.5\")  Wt 97.6 kg (215 lb 3.2 oz)  SpO2 99%  BMI 27.26 kg/m2 Estimated body mass index is 27.26 kg/(m^2) as calculated from the following:    Height as of this encounter: 1.892 m (6' 2.5\").    Weight as of this encounter: 97.6 kg (215 lb 3.2 oz).  Medication Reconciliation: complete     ESS 3  Neck 39cm    Shana Olympia  Sleep Clinic - Specialist      "

## 2018-03-08 NOTE — PATIENT INSTRUCTIONS

## 2018-03-08 NOTE — LETTER
"    3/8/2018         RE: Pool Ontiveros  [address] [place] [address]        Dear Colleague,    Thank you for referring your patient, Pool Ontiveros, to the [facility] [place]. Please see a copy of my visit note below.        Sleep Consultation:    Date on this visit: 3/8/2018    Pool Ontiveros is sent by Paco Bonilla for a sleep consultation regarding snoring and excessive daytime sleepiness.    Primary Physician: Paco Bonilla     He has a history of depression with anxiety.  Reports tingling paresthesia feeling on arms and legs over the last few weeks.    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     Alarm goes off at 5:45 and he'll snooze until 6 AM.  Once he's up he feels fine.   Starts work around 7:30 AM and goes until around 5:15 PM.  Kids are in bed around 7:30 PM.  Getting into bed around 8:30 PM during the week.  Concerned because he is \"always in his head\" but really doesn't have trouble falling asleep and is often asleep around 9 PM.  Up every 2 - 3 hours during the night for various reasons (wife or kids, or unknown reasons).   Typically up on the weekends around 6:30 - 7 AM.  Not napping much as the kids aren't napping anymore.  Into bed around 10 - 11 PM typically.      Sleep Disordered Breathing - Snoring occasionally and gets woken by wife.  Snoring gets much worse with alcohol.  No snort arousals or witnessed apneas.   Has nocturia twice per night.  No nocturnal GERD.   Upon waking feels tired/fatigued.  He denies morning headaches.  Does get morning dry mouth.  Does get morning sinus congestion.   Patient was counseled on the importance of driving while alert, to pull over if drowsy, or nap before getting into the vehicle if sleepy.    Movement/Behaviors - Very rare somniloquy.  Did have somnambulism as a child.  No sleep related eating.  No dream enactment behavior.   Patient reports typical restless legs syndrome symptoms for years.  Feels like " he has to move or shake legs.  Typically occurs around the time he gets into bed.  If he is up late will fidget legs.     Also wakes due to leg kicks.   He reports bruxism but no nocturnal bruxing.     No hypnagogic/hypnopompic hallucinations.  No sleep paralysis.  No cataplexy.    Alcohol use - Typically will drink 3 times per week.  Will have 2 - 5 drinks in a sitting (2 during the week and 4 - 5 on weekends).  Caffeine intake - 5 cups/day of coffee. Last caffeine intake is usually lunchtime.  Tobacco exposure - None  Illicit substances - None    Lives with wife and 2 kids (ages 5 and 6).  Has no pets.     No family history of snoring or Obstructive Sleep Apnea.     Allergies:    Allergies   Allergen Reactions     Vicodin [Hydrocodone-Acetaminophen] Hives       Medications:    Current Outpatient Prescriptions   Medication Sig Dispense Refill     sertraline (ZOLOFT) 100 MG tablet Take 1 tablet (100 mg) by mouth daily 90 tablet 3     gemfibrozil (LOPID) 600 MG tablet Reported on 2/24/2017         Problem List:  Patient Active Problem List    Diagnosis Date Noted     Mixed hyperlipidemia 07/19/2016     Priority: Medium     Depression with anxiety 07/19/2016     Priority: Medium     History of inguinal hernia repair 03/05/2012     Priority: Medium     Overview:   Age 6       History of atrial septal defect repair 03/05/2012     Priority: Medium     Overview:   repaired age 5        Past Medical/Surgical History:  Past Medical History:   Diagnosis Date     Depression with anxiety 7/19/2016     Hernia, abdominal      History of atrial septal defect repair 3/5/2012    Overview:  repaired age 5      History of inguinal hernia repair 3/5/2012    Overview:  Age 6      Mixed hyperlipidemia 7/19/2016     Past Surgical History:   Procedure Laterality Date     HERNIA REPAIR       Social History:  Social History     Social History     Marital status:      Spouse name: N/A     Number of children: N/A     Years of  "education: N/A     Occupational History     Not on file.     Social History Main Topics     Smoking status: Never Smoker     Smokeless tobacco: Current User     Types: Chew      Comment: Gum, ocasionally      Alcohol use Yes      Comment: 5 drinks per week     Drug use: No     Sexual activity: Yes     Partners: Female     Other Topics Concern     Not on file     Social History Narrative     Family History:  Family History   Problem Relation Age of Onset     Bipolar Disorder Mother      Depression Mother      Review of Systems:  A complete review of systems reviewed by me is negative with the exeption of what has been mentioned in the history of present illness.  Shortness of breath with activity (more than expected for level of fitness).    Physical Examination:  Vitals: /79  Pulse 88  Resp 18  Ht 1.892 m (6' 2.5\")  Wt 97.6 kg (215 lb 3.2 oz)  SpO2 99%  BMI 27.26 kg/m2  BMI= Body mass index is 27.26 kg/(m^2).    Neck Cir (cm): 39 cm    [place] Total Score 3/8/2018   Total score - [place] 3     General appearance: No apparent distress, well groomed.    HEENT:   Head: Normocephalic, atraumatic.  Eyes: PERRL  Nose: Nares patent.  No exudate.  No septal deviation noted.  Mouth: Teeth: Healthy   Tongue: Normal  Oropharynx:  Mallampati Classification: II    Tonsils: Grade 1 bilaterally    Uvula: Normal    Neck: Supple without bruit.     Neck Cir (cm): 39 cm (3/8/2018 12:47 PM)    Cardiac: Regular rate and rhythm.  No murmurs.  Radial pulses are strong and symmetric.  Pulmonary: Symmetric air movement, lungs clear to auscultation bilaterally.  Musculoskeletal: No edema noted.  No clubbing or cyanosis.  Skin: Warm, dry, intact.  Neurologic: Alert and oriented to person, place and time   Gait is normal.  Psychiatric: Affect pleasant.  Mood good/stress..     Impression/Plan:  1. Restless legs syndrome (RLS)  Patient has symptoms consistent severe restless legs syndrome.  We discussed pathophysiology of disease " including dopamine signaling and iron as a co-factor.  We discussed treatment options, both pharmacologic and non-pharmacologic (including massage, heating and cooling, stretching and exercise).  Specific medications were discussed, namely Pramipexole (Mirapex).  We discuss symptoms and side effect profile of these medications.    - Patient would like to start treatment today.  - pramipexole (MIRAPEX) 0.25 MG tablet; Take 1-2 tablets (0.25-0.5 mg) by mouth At Bedtime Start at  1 tab per night.  After 1 week go up to 2 tabs.  Dispense: 60 tablet; Refill: 2    2. TATA (obstructive sleep apnea)  Obstructive Sleep Apnea possible but not a high risk patient. Will treat Restless Legs Syndrome first and if excessive daytime sleepiness doesn't improve then consider sleep study to eval for Obstructive Sleep Apnea.  - SLEEP EVALUATION & MANAGEMENT REFERRAL - ADULT -[facility] [phone]  (Age 18 and up)    3. Excessive daytime sleepiness  Suspect Restless Legs Syndrome + Stress     Literature provided regarding sleep apnea and restless leg syndrome.      F/u in 3 months    Polysomnography reviewed.  Obstructive sleep apnea reviewed.  Complications of untreated sleep apnea were reviewed.    Gary Rodriguez MD, Sleep Physician  Mar 8, 2018     CC: Paco HADLEY Bonilla          Again, thank you for allowing me to participate in the care of your patient.        Sincerely,        Gary Rodriguez MD

## 2018-03-08 NOTE — MR AVS SNAPSHOT
After Visit Summary   3/8/2018    Pool Ontiveros    MRN: [id number]           Patient Information     Date Of Birth          1980        Visit Information        Provider Department      3/8/2018 12:30 PM Gary Rodriguez MD [facility]        Today's Diagnoses     Restless legs syndrome (RLS)    -  1    TATA (obstructive sleep apnea)        Excessive daytime sleepiness          Care Instructions      Your BMI is Body mass index is 27.26 kg/(m^2).  Weight management is a personal decision.  If you are interested in exploring weight loss strategies, the following discussion covers the approaches that may be successful. Body mass index (BMI) is one way to tell whether you are at a healthy weight, overweight, or obese. It measures your weight in relation to your height.  A BMI of 18.5 to 24.9 is in the healthy range. A person with a BMI of 25 to 29.9 is considered overweight, and someone with a BMI of 30 or greater is considered obese. More than two-thirds of American adults are considered overweight or obese.  Being overweight or obese increases the risk for further weight gain. Excess weight may lead to heart disease and diabetes.  Creating and following plans for healthy eating and physical activity may help you improve your health.  Weight control is part of healthy lifestyle and includes exercise, emotional health, and healthy eating habits. Careful eating habits lifelong are the mainstay of weight control. Though there are significant health benefits from weight loss, long-term weight loss with diet alone may be very difficult to achieve- studies show long-term success with dietary management in less than 10% of people. Attaining a healthy weight may be especially difficult to achieve in those with severe obesity. In some cases, medications, devices and surgical management might be considered.  What can you do?  If you are overweight or obese and are interested in  methods for weight loss, you should discuss this with your provider.     Consider reducing daily calorie intake by 500 calories.     Keep a food journal.     Avoiding skipping meals, consider cutting portions instead.    Diet combined with exercise helps maintain muscle while optimizing fat loss. Strength training is particularly important for building and maintaining muscle mass. Exercise helps reduce stress, increase energy, and improves fitness. Increasing exercise without diet control, however, may not burn enough calories to loose weight.       Start walking three days a week 10-20 minutes at a time    Work towards walking thirty minutes five days a week     Eventually, increase the speed of your walking for 1-2 minutes at time    In addition, we recommend that you review healthy lifestyles and methods for weight loss available through the National Institutes of Health patient information sites:  http://win.niddk.nih.gov/publications/index.htm    And look into health and wellness programs that may be available through your health insurance provider, employer, local community center, or danny club.    Weight management plan: Patient was referred to their PCP to discuss a diet and exercise plan.              Follow-ups after your visit        Follow-up notes from your care team     Return in about 3 months (around 6/8/2018) for PAP Compliance Check.      Your next 10 appointments already scheduled     Tye 15, 2018  1:00 PM CDT   Return Sleep Patient with Gary Rodriguez MD   [facility] ([facility])    [address] [place] [id number] [phone]              Who to contact     If you have questions or need follow up information about today's clinic visit or your schedule please contact [facility] directly at [phone].  Normal or non-critical lab and imaging results will be communicated to you by MyChart, letter or  "phone within 4 business days after the clinic has received the results. If you do not hear from us within 7 days, please contact the clinic through XO1 or phone. If you have a critical or abnormal lab result, we will notify you by phone as soon as possible.  Submit refill requests through XO1 or call your pharmacy and they will forward the refill request to us. Please allow 3 business days for your refill to be completed.          Additional Information About Your Visit        [employer] Information     XO1 gives you secure access to your electronic health record. If you see a primary care provider, you can also send messages to your care team and make appointments. If you have questions, please call your primary care clinic.  If you do not have a primary care provider, please call [phone] and they will assist you.        Care EveryWhere ID     This is your Care EveryWhere ID. This could be used by other organizations to access your [facility] medical records  [id number]        Your Vitals Were     Pulse Respirations Height Pulse Oximetry BMI (Body Mass Index)       88 18 1.892 m (6' 2.5\") 99% 27.26 kg/m2        Blood Pressure from Last 3 Encounters:   03/08/18 126/80   02/27/18 104/69   11/06/17 131/82    Weight from Last 3 Encounters:   03/08/18 97.6 kg (215 lb 3.2 oz)   02/27/18 97.1 kg (214 lb)   11/06/17 100.9 kg (222 lb 6.4 oz)              We Performed the Following     SLEEP EVALUATION & MANAGEMENT REFERRAL - ADULT -[facility] [phone]  (Age 18 and up)          Today's Medication Changes          These changes are accurate as of 3/8/18  1:45 PM.  If you have any questions, ask your nurse or doctor.               Start taking these medicines.        Dose/Directions    pramipexole 0.25 MG tablet   Commonly known as:  MIRAPEX   Used for:  Restless legs syndrome (RLS)        Dose:  0.25-0.5 mg   Take 1-2 tablets (0.25-0.5 mg) by mouth At Bedtime Start at  1 tab per " night.  After 1 week go up to 2 tabs.   Quantity:  60 tablet   Refills:  2            Where to get your medicines      These medications were sent to [facility] [id number] [facility] [place] - [address]  [address], [place] [address]     Phone:  [phone]     pramipexole 0.25 MG tablet                Primary Care Provider Office Phone # Fax #    Paco Bonilla [phone]        [facility] [address]        Equal Access to Services     [facility]: Hadii aad ku hadasho Soomaali, waaxda luqadaha, qaybta kaalmada adeegyada, waxpauline melendez hayedilberton kelly sherman . So [facility] [phone].    ATENCIÓN: Si habla español, tiene a kelly disposición servicios gratuitos de asistencia lingüística. [facility] [phone].    We comply with applicable federal civil rights laws and [place] laws. We do not discriminate on the basis of race, color, national origin, age, disability, sex, sexual orientation, or gender identity.            Thank you!     Thank you for choosing [facility]  for your care. Our goal is always to provide you with excellent care. Hearing back from our patients is one way we can continue to improve our services. Please take a few minutes to complete the written survey that you may receive in the mail after your visit with us. Thank you!             Your Updated Medication List - Protect others around you: Learn how to safely use, store and throw away your medicines at www.disposemymeds.org.          This list is accurate as of 3/8/18  1:45 PM.  Always use your most recent med list.                   Brand Name Dispense Instructions for use Diagnosis    gemfibrozil 600 MG tablet    LOPID     Reported on 2/24/2017        pramipexole 0.25 MG tablet    MIRAPEX    60 tablet    Take 1-2 tablets (0.25-0.5 mg) by mouth At Bedtime Start at  1 tab per night.  After 1 week go up to 2 tabs.    Restless legs syndrome (RLS)       sertraline 100 MG  tablet    ZOLOFT    90 tablet    Take 1 tablet (100 mg) by mouth daily    Depression with anxiety

## 2018-03-08 NOTE — PROGRESS NOTES
"    Sleep Consultation:    Date on this visit: 3/8/2018    Pool Ontiveros is sent by Paco Bonilla for a sleep consultation regarding snoring and excessive daytime sleepiness.    Primary Physician: Paco Bonilla     He has a history of depression with anxiety.  Reports tingling paresthesia feeling on arms and legs over the last few weeks.    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     Alarm goes off at 5:45 and he'll snooze until 6 AM.  Once he's up he feels fine.   Starts work around 7:30 AM and goes until around 5:15 PM.  Kids are in bed around 7:30 PM.  Getting into bed around 8:30 PM during the week.  Concerned because he is \"always in his head\" but really doesn't have trouble falling asleep and is often asleep around 9 PM.  Up every 2 - 3 hours during the night for various reasons (wife or kids, or unknown reasons).   Typically up on the weekends around 6:30 - 7 AM.  Not napping much as the kids aren't napping anymore.  Into bed around 10 - 11 PM typically.      Sleep Disordered Breathing - Snoring occasionally and gets woken by wife.  Snoring gets much worse with alcohol.  No snort arousals or witnessed apneas.   Has nocturia twice per night.  No nocturnal GERD.   Upon waking feels tired/fatigued.  He denies morning headaches.  Does get morning dry mouth.  Does get morning sinus congestion.   Patient was counseled on the importance of driving while alert, to pull over if drowsy, or nap before getting into the vehicle if sleepy.    Movement/Behaviors - Very rare somniloquy.  Did have somnambulism as a child.  No sleep related eating.  No dream enactment behavior.   Patient reports typical restless legs syndrome symptoms for years.  Feels like he has to move or shake legs.  Typically occurs around the time he gets into bed.  If he is up late will fidget legs.     Also wakes due to leg kicks.   He reports bruxism but no nocturnal bruxing.     No hypnagogic/hypnopompic hallucinations.  No sleep " paralysis.  No cataplexy.    Alcohol use - Typically will drink 3 times per week.  Will have 2 - 5 drinks in a sitting (2 during the week and 4 - 5 on weekends).  Caffeine intake - 5 cups/day of coffee. Last caffeine intake is usually lunchtime.  Tobacco exposure - None  Illicit substances - None    Lives with wife and 2 kids (ages 5 and 6).  Has no pets.     No family history of snoring or Obstructive Sleep Apnea.     Allergies:    Allergies   Allergen Reactions     Vicodin [Hydrocodone-Acetaminophen] Hives       Medications:    Current Outpatient Prescriptions   Medication Sig Dispense Refill     sertraline (ZOLOFT) 100 MG tablet Take 1 tablet (100 mg) by mouth daily 90 tablet 3     gemfibrozil (LOPID) 600 MG tablet Reported on 2/24/2017         Problem List:  Patient Active Problem List    Diagnosis Date Noted     Mixed hyperlipidemia 07/19/2016     Priority: Medium     Depression with anxiety 07/19/2016     Priority: Medium     History of inguinal hernia repair 03/05/2012     Priority: Medium     Overview:   Age 6       History of atrial septal defect repair 03/05/2012     Priority: Medium     Overview:   repaired age 5        Past Medical/Surgical History:  Past Medical History:   Diagnosis Date     Depression with anxiety 7/19/2016     Hernia, abdominal      History of atrial septal defect repair 3/5/2012    Overview:  repaired age 5      History of inguinal hernia repair 3/5/2012    Overview:  Age 6      Mixed hyperlipidemia 7/19/2016     Past Surgical History:   Procedure Laterality Date     HERNIA REPAIR       Social History:  Social History     Social History     Marital status:      Spouse name: N/A     Number of children: N/A     Years of education: N/A     Occupational History     Not on file.     Social History Main Topics     Smoking status: Never Smoker     Smokeless tobacco: Current User     Types: Chew      Comment: Gum, ocasionally      Alcohol use Yes      Comment: 5 drinks per week      "Drug use: No     Sexual activity: Yes     Partners: Female     Other Topics Concern     Not on file     Social History Narrative     Family History:  Family History   Problem Relation Age of Onset     Bipolar Disorder Mother      Depression Mother      Review of Systems:  A complete review of systems reviewed by me is negative with the exeption of what has been mentioned in the history of present illness.  Shortness of breath with activity (more than expected for level of fitness).    Physical Examination:  Vitals: /79  Pulse 88  Resp 18  Ht 1.892 m (6' 2.5\")  Wt 97.6 kg (215 lb 3.2 oz)  SpO2 99%  BMI 27.26 kg/m2  BMI= Body mass index is 27.26 kg/(m^2).    Neck Cir (cm): 39 cm    [place] Total Score 3/8/2018   Total score - [place] 3     General appearance: No apparent distress, well groomed.    HEENT:   Head: Normocephalic, atraumatic.  Eyes: PERRL  Nose: Nares patent.  No exudate.  No septal deviation noted.  Mouth: Teeth: Healthy   Tongue: Normal  Oropharynx:  Mallampati Classification: II    Tonsils: Grade 1 bilaterally    Uvula: Normal    Neck: Supple without bruit.     Neck Cir (cm): 39 cm (3/8/2018 12:47 PM)    Cardiac: Regular rate and rhythm.  No murmurs.  Radial pulses are strong and symmetric.  Pulmonary: Symmetric air movement, lungs clear to auscultation bilaterally.  Musculoskeletal: No edema noted.  No clubbing or cyanosis.  Skin: Warm, dry, intact.  Neurologic: Alert and oriented to person, place and time   Gait is normal.  Psychiatric: Affect pleasant.  Mood good/stress..     Impression/Plan:  1. Restless legs syndrome (RLS)  Patient has symptoms consistent severe restless legs syndrome.  We discussed pathophysiology of disease including dopamine signaling and iron as a co-factor.  We discussed treatment options, both pharmacologic and non-pharmacologic (including massage, heating and cooling, stretching and exercise).  Specific medications were discussed, namely Pramipexole (Mirapex).  " We discuss symptoms and side effect profile of these medications.    - Patient would like to start treatment today.  - pramipexole (MIRAPEX) 0.25 MG tablet; Take 1-2 tablets (0.25-0.5 mg) by mouth At Bedtime Start at  1 tab per night.  After 1 week go up to 2 tabs.  Dispense: 60 tablet; Refill: 2    2. TATA (obstructive sleep apnea)  Obstructive Sleep Apnea possible but not a high risk patient. Will treat Restless Legs Syndrome first and if excessive daytime sleepiness doesn't improve then consider sleep study to eval for Obstructive Sleep Apnea.  - SLEEP EVALUATION & MANAGEMENT REFERRAL - [facility] [phone]  (Age 18 and up)    3. Excessive daytime sleepiness  Suspect Restless Legs Syndrome + Stress     Literature provided regarding sleep apnea and restless leg syndrome.      F/u in 3 months    Polysomnography reviewed.  Obstructive sleep apnea reviewed.  Complications of untreated sleep apnea were reviewed.    Gary Rodriguez MD, Sleep Physician  Mar 8, 2018     CC: Paco Bonilla

## 2018-04-22 ENCOUNTER — TRANSFERRED RECORDS (OUTPATIENT)
Dept: HEALTH INFORMATION MANAGEMENT | Facility: CLINIC | Age: 38
End: 2018-04-22

## 2018-04-22 LAB
CREAT SERPL-MCNC: 1.11 MG/DL (ref 0.67–1.17)
GFR SERPL CREATININE-BSD FRML MDRD: >60 ML/MIN/1.73M2 (ref 60–150)
GLUCOSE SERPL-MCNC: 88 MG/DL (ref 74–100)
POTASSIUM SERPL-SCNC: 3.8 MMOL/L (ref 3.5–5.1)

## 2018-04-23 ENCOUNTER — TELEPHONE (OUTPATIENT)
Dept: FAMILY MEDICINE | Facility: CLINIC | Age: 38
End: 2018-04-23

## 2018-04-23 NOTE — TELEPHONE ENCOUNTER
Pt called to let you know he was seen at [address] [place] [address] on Sunday night for a black out/possible seizure. Appt scheduled for Friday. Will call [address] [place] [address] for records.  Michaela Boothe,

## 2018-04-26 ENCOUNTER — OFFICE VISIT (OUTPATIENT)
Dept: FAMILY MEDICINE | Facility: CLINIC | Age: 38
End: 2018-04-26
Payer: COMMERCIAL

## 2018-04-26 VITALS
RESPIRATION RATE: 12 BRPM | SYSTOLIC BLOOD PRESSURE: 112 MMHG | HEART RATE: 83 BPM | WEIGHT: 215 LBS | TEMPERATURE: 98 F | OXYGEN SATURATION: 98 % | BODY MASS INDEX: 26.73 KG/M2 | DIASTOLIC BLOOD PRESSURE: 74 MMHG | HEIGHT: 75 IN

## 2018-04-26 DIAGNOSIS — R56.9 SEIZURE-LIKE ACTIVITY (H): Primary | ICD-10-CM

## 2018-04-26 DIAGNOSIS — R55 NEAR SYNCOPE: ICD-10-CM

## 2018-04-26 PROCEDURE — 99213 OFFICE O/P EST LOW 20 MIN: CPT | Performed by: FAMILY MEDICINE

## 2018-04-26 ASSESSMENT — ANXIETY QUESTIONNAIRES
1. FEELING NERVOUS, ANXIOUS, OR ON EDGE: MORE THAN HALF THE DAYS
6. BECOMING EASILY ANNOYED OR IRRITABLE: NOT AT ALL
IF YOU CHECKED OFF ANY PROBLEMS ON THIS QUESTIONNAIRE, HOW DIFFICULT HAVE THESE PROBLEMS MADE IT FOR YOU TO DO YOUR WORK, TAKE CARE OF THINGS AT HOME, OR GET ALONG WITH OTHER PEOPLE: NOT DIFFICULT AT ALL
2. NOT BEING ABLE TO STOP OR CONTROL WORRYING: NOT AT ALL
3. WORRYING TOO MUCH ABOUT DIFFERENT THINGS: SEVERAL DAYS
5. BEING SO RESTLESS THAT IT IS HARD TO SIT STILL: SEVERAL DAYS

## 2018-04-26 ASSESSMENT — PATIENT HEALTH QUESTIONNAIRE - PHQ9: 5. POOR APPETITE OR OVEREATING: SEVERAL DAYS

## 2018-04-26 NOTE — PROGRESS NOTES
SUBJECTIVE:   Pool Ontiveros is a 38 year old male who presents to clinic today for the following health issues:      ED/UC Followup:    Facility:  [address] [place] [address]   Date of visit: 4/22/18  Reason for visit: fainting episode  Current Status: Pt reports that he fells good         Problem list and histories reviewed & adjusted, as indicated.  Additional history: as documented    Patient Active Problem List   Diagnosis     History of inguinal hernia repair     History of atrial septal defect repair     Mixed hyperlipidemia     Depression with anxiety     Past Surgical History:   Procedure Laterality Date     HERNIA REPAIR         Social History   Substance Use Topics     Smoking status: Never Smoker     Smokeless tobacco: Former User     Types: Chew     Quit date: 1/1/2017      Comment: Gum, ocasionally      Alcohol use Yes      Comment: 5 drinks per week     Family History   Problem Relation Age of Onset     Bipolar Disorder Mother      Depression Mother          Current Outpatient Prescriptions   Medication Sig Dispense Refill     sertraline (ZOLOFT) 100 MG tablet TAKE 1 TABLET (100 MG) BY MOUTH DAILY 90 tablet 1     gemfibrozil (LOPID) 600 MG tablet Reported on 2/24/2017       pramipexole (MIRAPEX) 0.25 MG tablet Take 1-2 tablets (0.25-0.5 mg) by mouth At Bedtime Start at  1 tab per night.  After 1 week go up to 2 tabs. (Patient not taking: Reported on 4/26/2018) 60 tablet 2     Allergies   Allergen Reactions     Vicodin [Hydrocodone-Acetaminophen] Hives     Recent Labs   Lab Test  02/27/18   1039  02/24/17   0947  07/19/16   0945   LDL  81  99  92   HDL  47  43  59   TRIG  273*  264*  208*   ALT  24  28  29   CR  1.04   --   0.97   GFRESTIMATED  80   --   88   GFRESTBLACK  >90   --   >90   GFR Calc     POTASSIUM  4.3   --   4.0   TSH  1.35   --    --       BP Readings from Last 3 Encounters:   04/26/18 112/74   03/08/18 126/80   02/27/18 104/69    Wt Readings from Last 3 Encounters:  "  04/26/18 215 lb (97.5 kg)   03/08/18 215 lb 3.2 oz (97.6 kg)   02/27/18 214 lb (97.1 kg)                    Reviewed and updated as needed this visit by clinical staff       Reviewed and updated as needed this visit by Provider         ROS:  Constitutional, HEENT, cardiovascular, pulmonary, gi and gu systems are negative, except as otherwise noted.    OBJECTIVE:     /74 (Cuff Size: Adult Large)  Pulse 83  Temp 98  F (36.7  C) (Tympanic)  Resp 12  Ht 6' 2.5\" (1.892 m)  Wt 215 lb (97.5 kg)  SpO2 98%  BMI 27.24 kg/m2  Body mass index is 27.24 kg/(m^2).  GENERAL: healthy, alert and no distress  NECK: no adenopathy, no asymmetry, masses, or scars and thyroid normal to palpation  RESP: lungs clear to auscultation - no rales, rhonchi or wheezes  CV: regular rate and rhythm, normal S1 S2, no S3 or S4, no murmur, click or rub, no peripheral edema and peripheral pulses strong  ABDOMEN: soft, nontender, no hepatosplenomegaly, no masses and bowel sounds normal  MS: no gross musculoskeletal defects noted, no edema        ASSESSMENT/PLAN:     Pool was seen today for er f/u.    Diagnoses and all orders for this visit:    Seizure-like activity (H)  -     NEUROLOGY ADULT REFERRAL    Near syncope      Was seen at ER for near syncope vs seizure like activity, has been improved well without neurologic sequale, has no family or previous h/o seizure  Pt was not dehydration nor had excessive drinking, did not use any chemicals nor had physical exertion  Based on the story, he seemed having convulsion like body movement during the episode  Will have him to see neurology for further evaluation. Pt acknowledged and agreed with the plan      FUTURE APPOINTMENTS:       - Follow-up visit in 10 months for CPE    Paco Bonilla MD  [facility]    "

## 2018-04-26 NOTE — MR AVS SNAPSHOT
After Visit Summary   4/26/2018    Pool Ontiveros    MRN: [id number]           Patient Information     Date Of Birth          1980        Visit Information        Provider Department      4/26/2018 11:20 AM Paco Bonilla MD [facility] [place]        Today's Diagnoses     Seizure-like activity (H)    -  1    Near syncope           Follow-ups after your visit        Additional Services     NEUROLOGY ADULT REFERRAL       Your provider has referred you for the following:   Consult at [facility]: [facility] [place] [phone]   [email or web address]  [place] [phone]   [email or web address]    Please be aware that coverage of these services is subject to the terms and limitations of your health insurance plan.  Call member services at your health plan with any benefit or coverage questions.      Please bring the following with you to your appointment:    (1) Any X-Rays, CTs or MRIs which have been performed.  Contact the facility where they were done to arrange for  prior to your scheduled appointment.    (2) List of current medications  (3) This referral request   (4) Any documents/labs given to you for this referral                  Follow-up notes from your care team     Return in about 8 months (around 12/26/2018) for Physical Exam.      Your next 10 appointments already scheduled     Tye 15, 2018  1:00 PM CDT   Return Sleep Patient with Gary Rodriguez MD   [facility] ([facility] [place])    [address] [address] [phone]              Who to contact     If you have questions or need follow up information about today's clinic visit or your schedule please contact [facility] [place] directly at [phone].  Normal or non-critical lab and imaging results will be communicated to you by MyChart, letter or  "phone within 4 business days after the clinic has received the results. If you do not hear from us within 7 days, please contact the clinic through [employer] or phone. If you have a critical or abnormal lab result, we will notify you by phone as soon as possible.  Submit refill requests through [employer] or call your pharmacy and they will forward the refill request to us. Please allow 3 business days for your refill to be completed.          Additional Information About Your Visit        [employer] Information     [employer] lets you send messages to your doctor, view your test results, renew your prescriptions, schedule appointments and more. To sign up, go to [email or web address] . Click on \"Log in\" on the left side of the screen, which will take you to the Welcome page. Then click on \"Sign up Now\" on the right side of the page.     You will be asked to enter the access code listed below, as well as some personal information. Please follow the directions to create your username and password.     Your access code is: [email or web address]  Expires: 2018 11:23 AM     Your access code will  in 90 days. If you need help or a new code, please call your [facility] clinic or [phone].        Care EveryWhere ID     This is your Care EveryWhere ID. This could be used by other organizations to access your [facility] medical records  [id number]        Your Vitals Were     Pulse Temperature Respirations Height Pulse Oximetry BMI (Body Mass Index)    83 98  F (36.7  C) (Tympanic) 12 6' 2.5\" (1.892 m) 98% 27.24 kg/m2       Blood Pressure from Last 3 Encounters:   18 112/74   18 126/80   18 104/69    Weight from Last 3 Encounters:   18 215 lb (97.5 kg)   18 215 lb 3.2 oz (97.6 kg)   18 214 lb (97.1 kg)              We Performed the Following     NEUROLOGY ADULT REFERRAL        Primary Care Provider Office Phone # Fax #    Paco oBnilla [phone]       [address]  [place] [address]        Equal Access to Services     CURTIS PERERA : Hadii kriss bautista crystaljudy Solinda, waaxda luqadaha, qaybta karieelsamino otero, ashley reyezlewiswaqas dubon. So [facility] [phone].    ATENCIÓN: Si habla español, tiene a kelly disposición servicios gratuitos de asistencia lingüística. Llame al [phone].    We comply with applicable federal civil rights laws and [place] laws. We do not discriminate on the basis of race, color, national origin, age, disability, sex, sexual orientation, or gender identity.            Thank you!     Thank you for choosing [facility] [place]  for your care. Our goal is always to provide you with excellent care. Hearing back from our patients is one way we can continue to improve our services. Please take a few minutes to complete the written survey that you may receive in the mail after your visit with us. Thank you!             Your Updated Medication List - Protect others around you: Learn how to safely use, store and throw away your medicines at www.disposemymeds.org.          This list is accurate as of 4/26/18 11:23 AM.  Always use your most recent med list.                   Brand Name Dispense Instructions for use Diagnosis    gemfibrozil 600 MG tablet    LOPID     Reported on 2/24/2017        pramipexole 0.25 MG tablet    MIRAPEX    60 tablet    Take 1-2 tablets (0.25-0.5 mg) by mouth At Bedtime Start at  1 tab per night.  After 1 week go up to 2 tabs.    Restless legs syndrome (RLS)       sertraline 100 MG tablet    ZOLOFT    90 tablet    TAKE 1 TABLET (100 MG) BY MOUTH DAILY    Depression with anxiety

## 2018-04-27 ASSESSMENT — PATIENT HEALTH QUESTIONNAIRE - PHQ9: SUM OF ALL RESPONSES TO PHQ QUESTIONS 1-9: 3

## 2018-05-07 ENCOUNTER — TRANSFERRED RECORDS (OUTPATIENT)
Dept: HEALTH INFORMATION MANAGEMENT | Facility: CLINIC | Age: 38
End: 2018-05-07

## 2018-05-21 ENCOUNTER — TRANSFERRED RECORDS (OUTPATIENT)
Dept: HEALTH INFORMATION MANAGEMENT | Facility: CLINIC | Age: 38
End: 2018-05-21

## 2018-10-17 DIAGNOSIS — F41.8 DEPRESSION WITH ANXIETY: ICD-10-CM

## 2018-10-17 RX ORDER — SERTRALINE HYDROCHLORIDE 100 MG/1
TABLET, FILM COATED ORAL
Qty: 90 TABLET | Refills: 1 | Status: SHIPPED | OUTPATIENT
Start: 2018-10-17 | End: 2019-04-29

## 2018-10-17 NOTE — TELEPHONE ENCOUNTER
PHQ-9 SCORE 10/26/2017 4/26/2018 10/17/2018   Total Score 2 3 2       Prescription approved per [facility] Refill Protocol.    Joy JOHNSON RN  EP Triage

## 2018-10-17 NOTE — TELEPHONE ENCOUNTER
"Requested Prescriptions   Pending Prescriptions Disp Refills     sertraline (ZOLOFT) 100 MG tablet [Pharmacy Med Name: SERTRALINE  MG TABLET]  Last Written Prescription Date:  3/26/18  Last Fill Quantity: 90,  # refills: 1   Last office visit: 4/26/2018 with prescribing provider:  ronnie   Future Office Visit:     90 tablet 1     Sig: TAKE 1 TABLET (100 MG) BY MOUTH DAILY    SSRIs Protocol Passed    10/17/2018  1:56 AM       Passed - Recent (12 mo) or future (30 days) visit within the authorizing provider's specialty    Patient had office visit in the last 12 months or has a visit in the next 30 days with authorizing provider or within the authorizing provider's specialty.  See \"Patient Info\" tab in inbasket, or \"Choose Columns\" in Meds & Orders section of the refill encounter.      PHQ-9 SCORE 2/24/2017 10/26/2017 4/26/2018   Total Score 5 2 3     DARWIN-7 SCORE 7/19/2016 10/26/2017   Total Score 3 3            Passed - Patient is age 18 or older          "

## 2018-10-18 ASSESSMENT — PATIENT HEALTH QUESTIONNAIRE - PHQ9: SUM OF ALL RESPONSES TO PHQ QUESTIONS 1-9: 2

## 2019-04-29 DIAGNOSIS — F41.8 DEPRESSION WITH ANXIETY: ICD-10-CM

## 2019-04-29 NOTE — TELEPHONE ENCOUNTER
"Left message on answering machine for patient to call back.  Needs updated phq9    Whitney AlejandreRN BSN  [facility]  [phone]      Requested Prescriptions   Pending Prescriptions Disp Refills     sertraline (ZOLOFT) 100 MG tablet [Pharmacy Med Name: SERTRALINE  MG TABLET] 90 tablet 1     Sig: TAKE 1 TABLET (100 MG) BY MOUTH DAILY       SSRIs Protocol Failed - 4/29/2019  2:03 AM        Failed - Recent (12 mo) or future (30 days) visit within the authorizing provider's specialty     Patient had office visit in the last 12 months or has a visit in the next 30 days with authorizing provider or within the authorizing provider's specialty.  See \"Patient Info\" tab in inbasket, or \"Choose Columns\" in Meds & Orders section of the refill encounter.              Passed - Medication is active on med list        Passed - Patient is age 18 or older        sertraline (ZOLOFT) 100 MG tablet 90 tablet 1 10/17/2018        Last Written Prescription Date:  10/17/2018  Last Fill Quantity: 90,  # refills: 1   Last office visit: 4/26/2018 with prescribing provider:  Dr. Bonilla   Future Office Visit: Unknown     "

## 2019-04-30 RX ORDER — SERTRALINE HYDROCHLORIDE 100 MG/1
TABLET, FILM COATED ORAL
Qty: 90 TABLET | Refills: 1 | Status: SHIPPED | OUTPATIENT
Start: 2019-04-30 | End: 2019-11-13

## 2019-04-30 ASSESSMENT — PATIENT HEALTH QUESTIONNAIRE - PHQ9: SUM OF ALL RESPONSES TO PHQ QUESTIONS 1-9: 2

## 2019-04-30 NOTE — TELEPHONE ENCOUNTER
PHQ-9 SCORE 4/26/2018 10/17/2018 4/30/2019   PHQ-9 Total Score 3 2 2     Future Office Visit:   Next 5 appointments (look out 90 days)    May 03, 2019 10:40 AM CDT  PHYSICAL with Paco Bonilla MD  [facility] ([facility]) [address] [place] [id number]  [phone]         Whitney Alejandre RN BSN  [facility]  [phone]

## 2019-05-03 ENCOUNTER — OFFICE VISIT (OUTPATIENT)
Dept: FAMILY MEDICINE | Facility: CLINIC | Age: 39
End: 2019-05-03
Payer: COMMERCIAL

## 2019-05-03 VITALS
WEIGHT: 223 LBS | SYSTOLIC BLOOD PRESSURE: 112 MMHG | TEMPERATURE: 98.1 F | BODY MASS INDEX: 28.62 KG/M2 | DIASTOLIC BLOOD PRESSURE: 68 MMHG | OXYGEN SATURATION: 98 % | HEIGHT: 74 IN | HEART RATE: 88 BPM

## 2019-05-03 DIAGNOSIS — F41.8 DEPRESSION WITH ANXIETY: ICD-10-CM

## 2019-05-03 DIAGNOSIS — Z11.4 SCREENING FOR HIV (HUMAN IMMUNODEFICIENCY VIRUS): ICD-10-CM

## 2019-05-03 DIAGNOSIS — Z00.00 HEALTHCARE MAINTENANCE: Primary | ICD-10-CM

## 2019-05-03 LAB
ALT SERPL W P-5'-P-CCNC: 26 U/L (ref 0–70)
ANION GAP SERPL CALCULATED.3IONS-SCNC: 9 MMOL/L (ref 3–14)
BUN SERPL-MCNC: 15 MG/DL (ref 7–30)
CALCIUM SERPL-MCNC: 8.8 MG/DL (ref 8.5–10.1)
CHLORIDE SERPL-SCNC: 109 MMOL/L (ref 94–109)
CHOLEST SERPL-MCNC: 196 MG/DL
CO2 SERPL-SCNC: 20 MMOL/L (ref 20–32)
CREAT SERPL-MCNC: 1.09 MG/DL (ref 0.66–1.25)
ERYTHROCYTE [DISTWIDTH] IN BLOOD BY AUTOMATED COUNT: 13.2 % (ref 10–15)
GFR SERPL CREATININE-BSD FRML MDRD: 86 ML/MIN/{1.73_M2}
GLUCOSE SERPL-MCNC: 97 MG/DL (ref 70–99)
HCT VFR BLD AUTO: 43.1 % (ref 40–53)
HDLC SERPL-MCNC: 49 MG/DL
HGB BLD-MCNC: 15.1 G/DL (ref 13.3–17.7)
LDLC SERPL CALC-MCNC: 100 MG/DL
MCH RBC QN AUTO: 31.1 PG (ref 26.5–33)
MCHC RBC AUTO-ENTMCNC: 35 G/DL (ref 31.5–36.5)
MCV RBC AUTO: 89 FL (ref 78–100)
NONHDLC SERPL-MCNC: 147 MG/DL
PLATELET # BLD AUTO: 170 10E9/L (ref 150–450)
POTASSIUM SERPL-SCNC: 3.8 MMOL/L (ref 3.4–5.3)
RBC # BLD AUTO: 4.86 10E12/L (ref 4.4–5.9)
SODIUM SERPL-SCNC: 138 MMOL/L (ref 133–144)
TRIGL SERPL-MCNC: 235 MG/DL
WBC # BLD AUTO: 7.6 10E9/L (ref 4–11)

## 2019-05-03 PROCEDURE — 80048 BASIC METABOLIC PNL TOTAL CA: CPT | Performed by: FAMILY MEDICINE

## 2019-05-03 PROCEDURE — 85027 COMPLETE CBC AUTOMATED: CPT | Performed by: FAMILY MEDICINE

## 2019-05-03 PROCEDURE — 80061 LIPID PANEL: CPT | Performed by: FAMILY MEDICINE

## 2019-05-03 PROCEDURE — 99395 PREV VISIT EST AGE 18-39: CPT | Performed by: FAMILY MEDICINE

## 2019-05-03 PROCEDURE — 84460 ALANINE AMINO (ALT) (SGPT): CPT | Performed by: FAMILY MEDICINE

## 2019-05-03 PROCEDURE — 36415 COLL VENOUS BLD VENIPUNCTURE: CPT | Performed by: FAMILY MEDICINE

## 2019-05-03 ASSESSMENT — MIFFLIN-ST. JEOR: SCORE: 2001.27
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
## 2019-10-01 ENCOUNTER — HEALTH MAINTENANCE LETTER (OUTPATIENT)
Age: 39
End: 2019-10-01

## 2019-11-13 DIAGNOSIS — F41.8 DEPRESSION WITH ANXIETY: ICD-10-CM

## 2019-11-13 NOTE — TELEPHONE ENCOUNTER
"Requested Prescriptions   Pending Prescriptions Disp Refills     sertraline (ZOLOFT) 100 MG tablet [Pharmacy Med Name: SERTRALINE  MG TABLET] 30 tablet 5     Sig: TAKE 1 TABLET (100 MG) BY MOUTH DAILY  Last Written Prescription Date:  4/30/19  Last Fill Quantity: 90,  # refills: 1   Last office visit: 5/3/2019 with prescribing provider:  Chad   Future Office Visit:           SSRIs Protocol Failed - 11/13/2019  1:55 AM        Failed - PHQ-9 score less than 5 in past 6 months     Please review last PHQ-9 score.   PHQ-9 SCORE 4/26/2018 10/17/2018 4/30/2019   PHQ-9 Total Score 3 2 2               Failed - Recent (6 mo) or future (30 days) visit within the authorizing provider's specialty     Patient had office visit in the last 6 months or has a visit in the next 30 days with authorizing provider or within the authorizing provider's specialty.  See \"Patient Info\" tab in inbasket, or \"Choose Columns\" in Meds & Orders section of the refill encounter.            Passed - Medication is active on med list        Passed - Patient is age 18 or older          "

## 2019-11-14 RX ORDER — SERTRALINE HYDROCHLORIDE 100 MG/1
TABLET, FILM COATED ORAL
Qty: 30 TABLET | Refills: 5 | Status: SHIPPED | OUTPATIENT
Start: 2019-11-14 | End: 2021-07-28

## 2019-11-14 NOTE — TELEPHONE ENCOUNTER
Routing refill request to provider for review/approval because:  Labs not current:  Phq9    DAHLIA AguilarN, RN  [employer] Triage

## 2020-05-14 DIAGNOSIS — F41.8 DEPRESSION WITH ANXIETY: ICD-10-CM

## 2020-05-14 NOTE — TELEPHONE ENCOUNTER
Sent a my chart message to pt with PHQ 9 and DARWIN 7.  Advised needs to be seen and to call clinic to schedule an appt.    Joy JOHNSON RN  EP Triage

## 2020-05-14 NOTE — LETTER
May 22, 2020      Pool Ontiveros  [address] [place] [address]        Dear Pool,    I care about your health and have reviewed your health plan. I have reviewed your medical conditions, medication list, and lab results and am making recommendations based on this review, to better manage your health.    You are in particular need of attention regarding:  -Medication check    I am recommending that you:  -Schedule a virtual visit with me    Here is a list of Health Maintenance topics that are due now or due soon:  Health Maintenance Due   Topic Date Due     HIV Screening  04/17/1996     Preventive Care Visit  05/03/2020       Please call us at [phone] (or use [facility]) to address the above recommendations.     Thank you for trusting [facility] and we appreciate the opportunity to serve you.  We look forward to supporting your healthcare needs in the future.    Healthy Regards,    Paco Bonilla MD

## 2020-05-21 ENCOUNTER — VIRTUAL VISIT (OUTPATIENT)
Dept: FAMILY MEDICINE | Facility: CLINIC | Age: 40
End: 2020-05-21
Payer: COMMERCIAL

## 2020-05-21 DIAGNOSIS — F41.8 DEPRESSION WITH ANXIETY: ICD-10-CM

## 2020-05-21 PROCEDURE — 99213 OFFICE O/P EST LOW 20 MIN: CPT | Mod: TEL | Performed by: FAMILY MEDICINE

## 2020-05-21 RX ORDER — SERTRALINE HYDROCHLORIDE 100 MG/1
100 TABLET, FILM COATED ORAL DAILY
Qty: 90 TABLET | Refills: 3 | Status: SHIPPED | OUTPATIENT
Start: 2020-05-21 | End: 2021-06-15

## 2020-05-21 ASSESSMENT — PATIENT HEALTH QUESTIONNAIRE - PHQ9
SUM OF ALL RESPONSES TO PHQ QUESTIONS 1-9: 3
5. POOR APPETITE OR OVEREATING: SEVERAL DAYS

## 2020-05-21 ASSESSMENT — ANXIETY QUESTIONNAIRES
IF YOU CHECKED OFF ANY PROBLEMS ON THIS QUESTIONNAIRE, HOW DIFFICULT HAVE THESE PROBLEMS MADE IT FOR YOU TO DO YOUR WORK, TAKE CARE OF THINGS AT HOME, OR GET ALONG WITH OTHER PEOPLE: SOMEWHAT DIFFICULT
6. BECOMING EASILY ANNOYED OR IRRITABLE: NOT AT ALL
2. NOT BEING ABLE TO STOP OR CONTROL WORRYING: NOT AT ALL
1. FEELING NERVOUS, ANXIOUS, OR ON EDGE: SEVERAL DAYS
7. FEELING AFRAID AS IF SOMETHING AWFUL MIGHT HAPPEN: NOT AT ALL
5. BEING SO RESTLESS THAT IT IS HARD TO SIT STILL: SEVERAL DAYS
GAD7 TOTAL SCORE: 3
3. WORRYING TOO MUCH ABOUT DIFFERENT THINGS: NOT AT ALL

## 2020-05-21 NOTE — TELEPHONE ENCOUNTER
2nd message left for pt to call back: please schedule virtual visit and remind patient to check mychart message  Michaela Boothe,

## 2020-05-21 NOTE — PROGRESS NOTES
"Pool Ontiveros is a 40 year old male who is being evaluated via a billable telephone visit.      The patient has been notified of following:     \"This telephone visit will be conducted via a call between you and your physician/provider. We have found that certain health care needs can be provided without the need for a physical exam.  This service lets us provide the care you need with a short phone conversation.  If a prescription is necessary we can send it directly to your pharmacy.  If lab work is needed we can place an order for that and you can then stop by our lab to have the test done at a later time.    Telephone visits are billed at different rates depending on your insurance coverage. During this emergency period, for some insurers they may be billed the same as an in-person visit.  Please reach out to your insurance provider with any questions.    If during the course of the call the physician/provider feels a telephone visit is not appropriate, you will not be charged for this service.\"    Patient has given verbal consent for Telephone visit?  Yes    What phone number would you like to be contacted at? [phone]    How would you like to obtain your AVS? Mail a copy    Subjective     Pool Ontiveros is a 40 year old male who presents via phone visit today for the following health issues:    HPI  Depression and Anxiety Follow-Up    How are you doing with your depression since your last visit? No change    How are you doing with your anxiety since your last visit?  No change    Are you having other symptoms that might be associated with depression or anxiety? No    Have you had a significant life event? No     Do you have any concerns with your use of alcohol or other drugs? No    Social History     Tobacco Use     Smoking status: Never Smoker     Smokeless tobacco: Former User     Types: Chew     Tobacco comment: Gum, ocasionally    Substance Use Topics     Alcohol use: Yes     Comment: 5 " drinks per week     Drug use: No     PHQ 4/26/2018 10/17/2018 4/30/2019   PHQ-9 Total Score 3 2 2   Q9: Thoughts of better off dead/self-harm past 2 weeks Not at all Not at all Not at all     DARWIN-7 SCORE 7/19/2016 10/26/2017   Total Score 3 3     Last PHQ-9 4/30/2019   1.  Little interest or pleasure in doing things 0   2.  Feeling down, depressed, or hopeless 0   3.  Trouble falling or staying asleep, or sleeping too much 1   4.  Feeling tired or having little energy 1   5.  Poor appetite or overeating 0   6.  Feeling bad about yourself 0   7.  Trouble concentrating 0   8.  Moving slowly or restless 0   Q9: Thoughts of better off dead/self-harm past 2 weeks 0   PHQ-9 Total Score 2   Difficulty at work, home, or with people Not difficult at all     DARWIN-7  4/26/2018   1. Feeling nervous, anxious, or on edge 2   2. Not being able to stop or control worrying 0   3. Worrying too much about different things 1   4. Trouble relaxing 1   5. Being so restless that it is hard to sit still 1   6. Becoming easily annoyed or irritable 0   7. Feeling afraid, as if something awful might happen -   DARWIN-7 Total Score -   If you checked any problems, how difficult have they made it for you to do your work, take care of things at home, or get along with other people? Not difficult at all       Suicide Assessment Five-step Evaluation and Treatment (SAFE-T)      How many servings of fruits and vegetables do you eat daily?  0-1    On average, how many sweetened beverages do you drink each day (Examples: soda, juice, sweet tea, etc.  Do NOT count diet or artificially sweetened beverages)?   0    How many days per week do you exercise enough to make your heart beat faster? 4    How many minutes a day do you exercise enough to make your heart beat faster? 60 or more    How many days per week do you miss taking your medication? 0      Patient Active Problem List   Diagnosis     History of inguinal hernia repair     History of atrial septal  defect repair     Mixed hyperlipidemia     Depression with anxiety     Past Surgical History:   Procedure Laterality Date     HERNIA REPAIR         Social History     Tobacco Use     Smoking status: Never Smoker     Smokeless tobacco: Former User     Types: Chew     Tobacco comment: Gum, ocasionally    Substance Use Topics     Alcohol use: Yes     Comment: 5 drinks per week     Family History   Problem Relation Age of Onset     Bipolar Disorder Mother      Depression Mother          Current Outpatient Medications   Medication Sig Dispense Refill     sertraline (ZOLOFT) 100 MG tablet Take 1 tablet (100 mg) by mouth daily 90 tablet 3     sertraline (ZOLOFT) 100 MG tablet TAKE 1 TABLET (100 MG) BY MOUTH DAILY 30 tablet 5     gemfibrozil (LOPID) 600 MG tablet Reported on 2/24/2017       Allergies   Allergen Reactions     Vicodin [Hydrocodone-Acetaminophen] Hives     Recent Labs   Lab Test 05/03/19  1103 04/22/18 02/27/18  1039 02/24/17  0947   *  --  81 99   HDL 49  --  47 43   TRIG 235*  --  273* 264*   ALT 26  --  24 28   CR 1.09 1.11 1.04  --    GFRESTIMATED 86 >60 80  --    GFRESTBLACK >90  --  >90  --    POTASSIUM 3.8 3.8 4.3  --    TSH  --   --  1.35  --       BP Readings from Last 3 Encounters:   05/03/19 112/68   04/26/18 112/74   03/08/18 126/80    Wt Readings from Last 3 Encounters:   05/03/19 101.2 kg (223 lb)   04/26/18 97.5 kg (215 lb)   03/08/18 97.6 kg (215 lb 3.2 oz)                    Reviewed and updated as needed this visit by Provider         Review of Systems   Constitutional, HEENT, cardiovascular, pulmonary, gi and gu systems are negative, except as otherwise noted.       Objective   Reported vitals:  There were no vitals taken for this visit.   healthy, alert and no distress  PSYCH: Alert and oriented times 3; coherent speech, normal   rate and volume, able to articulate logical thoughts, able   to abstract reason, no tangential thoughts, no hallucinations   or delusions  His affect is  normal  RESP: No cough, no audible wheezing, able to talk in full sentences  Remainder of exam unable to be completed due to telephone visits    Diagnostic Test Results:  Labs reviewed in Epic        Assessment/Plan:  1. Depression with anxiety  Stable with current dose of meds, has no side effect from the meds, has no sign of overdosing nor using other chemicals, will keep monitoring   - sertraline (ZOLOFT) 100 MG tablet; Take 1 tablet (100 mg) by mouth daily  Dispense: 90 tablet; Refill: 3    Return in about 4 months (around 9/21/2020) for Physical Exam.      Phone call duration:  12 minutes    Paco Bonilla MD

## 2020-05-22 RX ORDER — SERTRALINE HYDROCHLORIDE 100 MG/1
TABLET, FILM COATED ORAL
Qty: 30 TABLET | Refills: 5 | OUTPATIENT
Start: 2020-05-22

## 2020-05-22 ASSESSMENT — ANXIETY QUESTIONNAIRES: GAD7 TOTAL SCORE: 3

## 2020-05-22 NOTE — TELEPHONE ENCOUNTER
3rd message left for patient. please schedule virtual visit and remind patient to check mychart message. Letter sent. Routing to pcp.    .Carmita GOSS    [facility] Lizbeth Gregg

## 2021-01-15 ENCOUNTER — HEALTH MAINTENANCE LETTER (OUTPATIENT)
Age: 41
End: 2021-01-15

## 2021-03-20 ENCOUNTER — IMMUNIZATION (OUTPATIENT)
Dept: NURSING | Facility: CLINIC | Age: 41
End: 2021-03-20
Payer: COMMERCIAL

## 2021-03-20 PROCEDURE — 0001A PR COVID VAC PFIZER DIL RECON 30 MCG/0.3 ML IM: CPT

## 2021-03-20 PROCEDURE — 91300 PR COVID VAC PFIZER DIL RECON 30 MCG/0.3 ML IM: CPT

## 2021-04-10 ENCOUNTER — IMMUNIZATION (OUTPATIENT)
Dept: NURSING | Facility: CLINIC | Age: 41
End: 2021-04-10
Attending: INTERNAL MEDICINE
Payer: COMMERCIAL

## 2021-04-10 PROCEDURE — 91300 PR COVID VAC PFIZER DIL RECON 30 MCG/0.3 ML IM: CPT

## 2021-04-10 PROCEDURE — 0002A PR COVID VAC PFIZER DIL RECON 30 MCG/0.3 ML IM: CPT

## 2021-07-28 ENCOUNTER — MYC MEDICAL ADVICE (OUTPATIENT)
Dept: FAMILY MEDICINE | Facility: CLINIC | Age: 41
End: 2021-07-28

## 2021-07-28 ENCOUNTER — OFFICE VISIT (OUTPATIENT)
Dept: FAMILY MEDICINE | Facility: CLINIC | Age: 41
End: 2021-07-28
Payer: COMMERCIAL

## 2021-07-28 VITALS
HEIGHT: 75 IN | OXYGEN SATURATION: 99 % | HEART RATE: 57 BPM | TEMPERATURE: 96.9 F | SYSTOLIC BLOOD PRESSURE: 112 MMHG | WEIGHT: 223 LBS | BODY MASS INDEX: 27.73 KG/M2 | DIASTOLIC BLOOD PRESSURE: 72 MMHG

## 2021-07-28 DIAGNOSIS — Z00.00 ROUTINE GENERAL MEDICAL EXAMINATION AT A HEALTH CARE FACILITY: Primary | ICD-10-CM

## 2021-07-28 DIAGNOSIS — E78.1 HYPERTRIGLYCERIDEMIA: ICD-10-CM

## 2021-07-28 DIAGNOSIS — Z23 NEED FOR VACCINATION: ICD-10-CM

## 2021-07-28 LAB
ALBUMIN SERPL-MCNC: 3.9 G/DL (ref 3.4–5)
ALP SERPL-CCNC: 50 U/L (ref 40–150)
ALT SERPL W P-5'-P-CCNC: 32 U/L (ref 0–70)
ANION GAP SERPL CALCULATED.3IONS-SCNC: 9 MMOL/L (ref 3–14)
AST SERPL W P-5'-P-CCNC: 19 U/L (ref 0–45)
BILIRUB SERPL-MCNC: 0.5 MG/DL (ref 0.2–1.3)
BUN SERPL-MCNC: 15 MG/DL (ref 7–30)
CALCIUM SERPL-MCNC: 9.4 MG/DL (ref 8.5–10.1)
CHLORIDE BLD-SCNC: 107 MMOL/L (ref 94–109)
CHOLEST SERPL-MCNC: 274 MG/DL
CO2 SERPL-SCNC: 21 MMOL/L (ref 20–32)
CREAT SERPL-MCNC: 1.02 MG/DL (ref 0.66–1.25)
ERYTHROCYTE [DISTWIDTH] IN BLOOD BY AUTOMATED COUNT: 12.7 % (ref 10–15)
FASTING STATUS PATIENT QL REPORTED: YES
GFR SERPL CREATININE-BSD FRML MDRD: >90 ML/MIN/1.73M2
GLUCOSE BLD-MCNC: 107 MG/DL (ref 70–99)
HCT VFR BLD AUTO: 43.2 % (ref 40–53)
HDLC SERPL-MCNC: 50 MG/DL
HGB BLD-MCNC: 15.7 G/DL (ref 13.3–17.7)
LDLC SERPL CALC-MCNC: 125 MG/DL
LDLC SERPL CALC-MCNC: ABNORMAL MG/DL
MCH RBC QN AUTO: 32 PG (ref 26.5–33)
MCHC RBC AUTO-ENTMCNC: 36.3 G/DL (ref 31.5–36.5)
MCV RBC AUTO: 88 FL (ref 78–100)
NONHDLC SERPL-MCNC: 224 MG/DL
PLATELET # BLD AUTO: 190 10E3/UL (ref 150–450)
POTASSIUM BLD-SCNC: 4 MMOL/L (ref 3.4–5.3)
PROT SERPL-MCNC: 7.2 G/DL (ref 6.8–8.8)
RBC # BLD AUTO: 4.9 10E6/UL (ref 4.4–5.9)
SODIUM SERPL-SCNC: 137 MMOL/L (ref 133–144)
TRIGL SERPL-MCNC: 521 MG/DL
WBC # BLD AUTO: 4.3 10E3/UL (ref 4–11)

## 2021-07-28 PROCEDURE — 99396 PREV VISIT EST AGE 40-64: CPT | Mod: 25 | Performed by: FAMILY MEDICINE

## 2021-07-28 PROCEDURE — 85027 COMPLETE CBC AUTOMATED: CPT | Performed by: FAMILY MEDICINE

## 2021-07-28 PROCEDURE — 36415 COLL VENOUS BLD VENIPUNCTURE: CPT | Performed by: FAMILY MEDICINE

## 2021-07-28 PROCEDURE — 83721 ASSAY OF BLOOD LIPOPROTEIN: CPT | Mod: 59 | Performed by: FAMILY MEDICINE

## 2021-07-28 PROCEDURE — 90471 IMMUNIZATION ADMIN: CPT | Performed by: FAMILY MEDICINE

## 2021-07-28 PROCEDURE — 90715 TDAP VACCINE 7 YRS/> IM: CPT | Performed by: FAMILY MEDICINE

## 2021-07-28 PROCEDURE — 80061 LIPID PANEL: CPT | Performed by: FAMILY MEDICINE

## 2021-07-28 PROCEDURE — 80053 COMPREHEN METABOLIC PANEL: CPT | Performed by: FAMILY MEDICINE

## 2021-07-28 ASSESSMENT — ENCOUNTER SYMPTOMS
HEADACHES: 0
HEARTBURN: 0
FREQUENCY: 0
MYALGIAS: 0
ARTHRALGIAS: 1
WEAKNESS: 0
PARESTHESIAS: 0
SHORTNESS OF BREATH: 0
EYE PAIN: 0
JOINT SWELLING: 0
CHILLS: 0
PALPITATIONS: 0
ABDOMINAL PAIN: 0
SORE THROAT: 0
COUGH: 0
HEMATURIA: 0
DIZZINESS: 0
DYSURIA: 0
NERVOUS/ANXIOUS: 0
HEMATOCHEZIA: 0
CONSTIPATION: 0
NAUSEA: 0
FEVER: 0
DIARRHEA: 0

## 2021-07-28 ASSESSMENT — PAIN SCALES - GENERAL: PAINLEVEL: NO PAIN (0)

## 2021-07-28 ASSESSMENT — MIFFLIN-ST. JEOR: SCORE: 2007.15

## 2021-07-28 NOTE — PROGRESS NOTES
SUBJECTIVE:   CC: Pool Ontiveros is an 41 year old male who presents for preventative health visit.       Patient has been advised of split billing requirements and indicates understanding: Yes  Healthy Habits:     Getting at least 3 servings of Calcium per day:  Yes    Bi-annual eye exam:  NO    Dental care twice a year:  NO    Sleep apnea or symptoms of sleep apnea:  Daytime drowsiness and Excessive snoring    Diet:  Regular (no restrictions)    Frequency of exercise:  1 day/week    Duration of exercise:  15-30 minutes    Taking medications regularly:  Yes    Medication side effects:  Not applicable    PHQ-2 Total Score: 0    Additional concerns today:  Yes      Today's PHQ-2 Score:   PHQ-2 ( 1999 Pfizer) 7/28/2021   Q1: Little interest or pleasure in doing things 0   Q2: Feeling down, depressed or hopeless 0   PHQ-2 Score 0   Q1: Little interest or pleasure in doing things Not at all   Q2: Feeling down, depressed or hopeless Not at all   PHQ-2 Score 0       Abuse: Current or Past(Physical, Sexual or Emotional)- No  Do you feel safe in your environment? Yes    Have you ever done Advance Care Planning? (For example, a Health Directive, POLST, or a discussion with a medical provider or your loved ones about your wishes): Yes, advance care planning is on file.    Social History     Tobacco Use     Smoking status: Never Smoker     Smokeless tobacco: Former User     Types: Chew     Tobacco comment: Gum, ocasionally    Substance Use Topics     Alcohol use: Yes     Comment: 5 drinks per week         Alcohol Use 7/28/2021   Prescreen: >3 drinks/day or >7 drinks/week? Yes   Prescreen: >3 drinks/day or >7 drinks/week? -   AUDIT SCORE  7       Last PSA: No results found for: PSA    Reviewed orders with patient. Reviewed health maintenance and updated orders accordingly - Yes  BP Readings from Last 3 Encounters:   07/28/21 112/72   05/03/19 112/68   04/26/18 112/74    Wt Readings from Last 3 Encounters:   07/28/21 101.2 kg  (223 lb)   05/03/19 101.2 kg (223 lb)   04/26/18 97.5 kg (215 lb)                  Patient Active Problem List   Diagnosis     History of inguinal hernia repair     History of atrial septal defect repair     Mixed hyperlipidemia     Depression with anxiety     Past Surgical History:   Procedure Laterality Date     HERNIA REPAIR         Social History     Tobacco Use     Smoking status: Never Smoker     Smokeless tobacco: Former User     Types: Chew     Tobacco comment: Gum, ocasionally    Substance Use Topics     Alcohol use: Yes     Comment: 5 drinks per week     Family History   Problem Relation Age of Onset     Bipolar Disorder Mother      Depression Mother          Current Outpatient Medications   Medication Sig Dispense Refill     sertraline (ZOLOFT) 100 MG tablet TAKE 1 TABLET BY MOUTH EVERY DAY 90 tablet 0     gemfibrozil (LOPID) 600 MG tablet Reported on 2/24/2017 (Patient not taking: Reported on 7/28/2021)       Allergies   Allergen Reactions     Vicodin [Hydrocodone-Acetaminophen] Hives     Recent Labs   Lab Test 05/03/19  1103 04/22/18  0000 02/27/18  1039 02/24/17  0947   *  --  81 99   HDL 49  --  47 43   TRIG 235*  --  273* 264*   ALT 26  --  24 28   CR 1.09 1.11 1.04  --    GFRESTIMATED 86 >60 80  --    GFRESTBLACK >90  --  >90  --    POTASSIUM 3.8 3.8 4.3  --    TSH  --   --  1.35  --         Reviewed and updated as needed this visit by clinical staff                 Reviewed and updated as needed this visit by Provider                Past Medical History:   Diagnosis Date     Depression with anxiety 7/19/2016     Hernia, abdominal      History of atrial septal defect repair 3/5/2012    Overview:  repaired age 5      History of inguinal hernia repair 3/5/2012    Overview:  Age 6      Mixed hyperlipidemia 7/19/2016      Past Surgical History:   Procedure Laterality Date     HERNIA REPAIR         Review of Systems   Constitutional: Negative for chills and fever.   HENT: Negative for congestion,  "ear pain, hearing loss and sore throat.    Eyes: Negative for pain and visual disturbance.   Respiratory: Negative for cough and shortness of breath.    Cardiovascular: Negative for chest pain, palpitations and peripheral edema.   Gastrointestinal: Negative for abdominal pain, constipation, diarrhea, heartburn, hematochezia and nausea.   Genitourinary: Negative for discharge, dysuria, frequency, genital sores, hematuria, impotence and urgency.   Musculoskeletal: Positive for arthralgias. Negative for joint swelling and myalgias.   Skin: Negative for rash.   Neurological: Negative for dizziness, weakness, headaches and paresthesias.   Psychiatric/Behavioral: Negative for mood changes. The patient is not nervous/anxious.      CONSTITUTIONAL: NEGATIVE for fever, chills, change in weight  INTEGUMENTARY/SKIN: NEGATIVE for worrisome rashes, moles or lesions  EYES: NEGATIVE for vision changes or irritation  ENT: NEGATIVE for ear, mouth and throat problems  RESP: NEGATIVE for significant cough or SOB  CV: NEGATIVE for chest pain, palpitations or peripheral edema  GI: NEGATIVE for nausea, abdominal pain, heartburn, or change in bowel habits   male: negative for dysuria, hematuria, decreased urinary stream, erectile dysfunction, urethral discharge  MUSCULOSKELETAL: NEGATIVE for significant arthralgias or myalgia  NEURO: NEGATIVE for weakness, dizziness or paresthesias  PSYCHIATRIC: NEGATIVE for changes in mood or affect    OBJECTIVE:   /72   Pulse 57   Temp 96.9  F (36.1  C) (Tympanic)   Ht 1.905 m (6' 3\")   Wt 101.2 kg (223 lb)   SpO2 99%   BMI 27.87 kg/m      Physical Exam  GENERAL: healthy, alert and no distress  EYES: Eyes grossly normal to inspection, PERRL and conjunctivae and sclerae normal  HENT: ear canals and TM's normal, nose and mouth without ulcers or lesions  NECK: no adenopathy, no asymmetry, masses, or scars and thyroid normal to palpation  RESP: lungs clear to auscultation - no rales, rhonchi or " "wheezes  CV: regular rate and rhythm, normal S1 S2, no S3 or S4, no murmur, click or rub, no peripheral edema and peripheral pulses strong  ABDOMEN: soft, nontender, no hepatosplenomegaly, no masses and bowel sounds normal  MS: no gross musculoskeletal defects noted, no edema  SKIN: no suspicious lesions or rashes  NEURO: Normal strength and tone, mentation intact and speech normal  BACK: no CVA tenderness, no paralumbar tenderness  PSYCH: mentation appears normal, affect normal/bright  LYMPH: no cervical, supraclavicular, axillary, or inguinal adenopathy        ASSESSMENT/PLAN:       ICD-10-CM    1. Routine general medical examination at a health care facility  Z00.00 REVIEW OF HEALTH MAINTENANCE PROTOCOL ORDERS     CBC with platelets     Comprehensive metabolic panel (BMP + Alb, Alk Phos, ALT, AST, Total. Bili, TP)     Lipid panel reflex to direct LDL Fasting       Patient has been advised of split billing requirements and indicates understanding: Yes  COUNSELING:   Reviewed preventive health counseling, as reflected in patient instructions    Estimated body mass index is 27.87 kg/m  as calculated from the following:    Height as of this encounter: 1.905 m (6' 3\").    Weight as of this encounter: 101.2 kg (223 lb).     Weight management plan: Discussed healthy diet and exercise guidelines    He reports that he has never smoked. He quit smokeless tobacco use about 4 years ago.  His smokeless tobacco use included chew.      Counseling Resources:  ATP IV Guidelines  Pooled Cohorts Equation Calculator  FRAX Risk Assessment  ICSI Preventive Guidelines  Dietary Guidelines for Americans, 2010  USDA's MyPlate  ASA Prophylaxis  Lung CA Screening    Paco Bonilla MD  [facility]  "

## 2021-07-29 RX ORDER — GEMFIBROZIL 600 MG/1
600 TABLET, FILM COATED ORAL 2 TIMES DAILY
Qty: 180 TABLET | Refills: 1 | Status: ON HOLD | OUTPATIENT
Start: 2021-07-29 | End: 2021-10-06

## 2021-09-04 ENCOUNTER — HEALTH MAINTENANCE LETTER (OUTPATIENT)
Age: 41
End: 2021-09-04

## 2021-09-22 DIAGNOSIS — F41.8 DEPRESSION WITH ANXIETY: ICD-10-CM

## 2021-09-29 NOTE — TELEPHONE ENCOUNTER
Patient Contact    Attempt # 2    Was call answered?  No.  Busy    On call back:     - Complete PHQ-9 / DARWIN-7

## 2021-10-04 RX ORDER — SERTRALINE HYDROCHLORIDE 100 MG/1
TABLET, FILM COATED ORAL
Qty: 90 TABLET | Refills: 1 | Status: SHIPPED | OUTPATIENT
Start: 2021-10-04 | End: 2022-02-04

## 2021-10-04 NOTE — TELEPHONE ENCOUNTER
Failed protocol.  please route to  team if patient needs an appointment     Whitney MERIDARN BSN  [facility]  [phone]

## 2021-10-05 ENCOUNTER — APPOINTMENT (OUTPATIENT)
Dept: INTERVENTIONAL RADIOLOGY/VASCULAR | Facility: CLINIC | Age: 41
DRG: 041 | End: 2021-10-05
Attending: EMERGENCY MEDICINE
Payer: COMMERCIAL

## 2021-10-05 ENCOUNTER — NURSE TRIAGE (OUTPATIENT)
Dept: NURSING | Facility: CLINIC | Age: 41
End: 2021-10-05

## 2021-10-05 ENCOUNTER — HOSPITAL ENCOUNTER (INPATIENT)
Facility: CLINIC | Age: 41
LOS: 3 days | Discharge: HOME | DRG: 041 | End: 2021-10-08
Attending: EMERGENCY MEDICINE | Admitting: HOSPITALIST
Payer: COMMERCIAL

## 2021-10-05 ENCOUNTER — APPOINTMENT (OUTPATIENT)
Dept: GENERAL RADIOLOGY | Facility: CLINIC | Age: 41
DRG: 041 | End: 2021-10-05
Attending: HOSPITALIST
Payer: COMMERCIAL

## 2021-10-05 ENCOUNTER — APPOINTMENT (OUTPATIENT)
Dept: ULTRASOUND IMAGING | Facility: CLINIC | Age: 41
DRG: 041 | End: 2021-10-05
Attending: EMERGENCY MEDICINE
Payer: COMMERCIAL

## 2021-10-05 DIAGNOSIS — E78.2 MIXED HYPERLIPIDEMIA: Primary | ICD-10-CM

## 2021-10-05 DIAGNOSIS — I82.629 ACUTE DEEP VEIN THROMBOSIS (DVT) OF OTHER VEIN OF UPPER EXTREMITY, UNSPECIFIED LATERALITY (H): ICD-10-CM

## 2021-10-05 LAB
ABO/RH(D): NORMAL
ALBUMIN SERPL-MCNC: 4 G/DL (ref 3.4–5)
ALP SERPL-CCNC: 67 U/L (ref 40–150)
ALT SERPL W P-5'-P-CCNC: 31 U/L (ref 0–70)
ANION GAP SERPL CALCULATED.3IONS-SCNC: 10 MMOL/L (ref 3–14)
ANTIBODY SCREEN: NEGATIVE
AST SERPL W P-5'-P-CCNC: 26 U/L (ref 0–45)
ATRIAL RATE - MUSE: 62 BPM
BASOPHILS # BLD AUTO: 0 10E3/UL (ref 0–0.2)
BASOPHILS NFR BLD AUTO: 0 %
BILIRUB SERPL-MCNC: 0.5 MG/DL (ref 0.2–1.3)
BUN SERPL-MCNC: 13 MG/DL (ref 7–30)
CALCIUM SERPL-MCNC: 9 MG/DL (ref 8.5–10.1)
CHLORIDE BLD-SCNC: 106 MMOL/L (ref 94–109)
CO2 SERPL-SCNC: 24 MMOL/L (ref 20–32)
CREAT SERPL-MCNC: 1.06 MG/DL (ref 0.66–1.25)
D DIMER PPP FEU-MCNC: 0.84 UG/ML FEU (ref 0–0.5)
DIASTOLIC BLOOD PRESSURE - MUSE: NORMAL MMHG
EOSINOPHIL # BLD AUTO: 0 10E3/UL (ref 0–0.7)
EOSINOPHIL NFR BLD AUTO: 1 %
ERYTHROCYTE [DISTWIDTH] IN BLOOD BY AUTOMATED COUNT: 12.7 % (ref 10–15)
GFR SERPL CREATININE-BSD FRML MDRD: 87 ML/MIN/1.73M2
GLUCOSE BLD-MCNC: 91 MG/DL (ref 70–99)
GLUCOSE BLDC GLUCOMTR-MCNC: 92 MG/DL (ref 70–99)
HCT VFR BLD AUTO: 41.9 % (ref 40–53)
HEMOCCULT STL QL: NEGATIVE
HGB BLD-MCNC: 14.2 G/DL (ref 13.3–17.7)
IMM GRANULOCYTES # BLD: 0 10E3/UL
IMM GRANULOCYTES NFR BLD: 0 %
INR PPP: 1.03 (ref 0.85–1.15)
INTERPRETATION ECG - MUSE: NORMAL
LACTATE SERPL-SCNC: 1.5 MMOL/L (ref 0.7–2)
LYMPHOCYTES # BLD AUTO: 1.2 10E3/UL (ref 0.8–5.3)
LYMPHOCYTES NFR BLD AUTO: 17 %
MCH RBC QN AUTO: 30.9 PG (ref 26.5–33)
MCHC RBC AUTO-ENTMCNC: 33.9 G/DL (ref 31.5–36.5)
MCV RBC AUTO: 91 FL (ref 78–100)
MONOCYTES # BLD AUTO: 0.4 10E3/UL (ref 0–1.3)
MONOCYTES NFR BLD AUTO: 6 %
NEUTROPHILS # BLD AUTO: 5.5 10E3/UL (ref 1.6–8.3)
NEUTROPHILS NFR BLD AUTO: 76 %
NRBC # BLD AUTO: 0 10E3/UL
NRBC BLD AUTO-RTO: 0 /100
P AXIS - MUSE: 52 DEGREES
PLATELET # BLD AUTO: 181 10E3/UL (ref 150–450)
POTASSIUM BLD-SCNC: 4.1 MMOL/L (ref 3.4–5.3)
PR INTERVAL - MUSE: 158 MS
PROT SERPL-MCNC: 7.7 G/DL (ref 6.8–8.8)
QRS DURATION - MUSE: 86 MS
QT - MUSE: 410 MS
QTC - MUSE: 416 MS
R AXIS - MUSE: -5 DEGREES
RBC # BLD AUTO: 4.59 10E6/UL (ref 4.4–5.9)
SARS-COV-2 RNA RESP QL NAA+PROBE: NEGATIVE
SODIUM SERPL-SCNC: 140 MMOL/L (ref 133–144)
SPECIMEN EXPIRATION DATE: NORMAL
SYSTOLIC BLOOD PRESSURE - MUSE: NORMAL MMHG
T AXIS - MUSE: 7 DEGREES
TROPONIN I SERPL-MCNC: <0.015 UG/L (ref 0–0.04)
VENTRICULAR RATE- MUSE: 62 BPM
WBC # BLD AUTO: 7.2 10E3/UL (ref 4–11)

## 2021-10-05 PROCEDURE — 99223 1ST HOSP IP/OBS HIGH 75: CPT | Mod: AI | Performed by: HOSPITALIST

## 2021-10-05 PROCEDURE — 250N000009 HC RX 250: Performed by: RADIOLOGY

## 2021-10-05 PROCEDURE — 36415 COLL VENOUS BLD VENIPUNCTURE: CPT | Performed by: EMERGENCY MEDICINE

## 2021-10-05 PROCEDURE — C1769 GUIDE WIRE: HCPCS

## 2021-10-05 PROCEDURE — C9803 HOPD COVID-19 SPEC COLLECT: HCPCS

## 2021-10-05 PROCEDURE — 87635 SARS-COV-2 COVID-19 AMP PRB: CPT | Performed by: EMERGENCY MEDICINE

## 2021-10-05 PROCEDURE — 83605 ASSAY OF LACTIC ACID: CPT | Performed by: EMERGENCY MEDICINE

## 2021-10-05 PROCEDURE — 99152 MOD SED SAME PHYS/QHP 5/>YRS: CPT

## 2021-10-05 PROCEDURE — 86901 BLOOD TYPING SEROLOGIC RH(D): CPT | Performed by: EMERGENCY MEDICINE

## 2021-10-05 PROCEDURE — 85610 PROTHROMBIN TIME: CPT | Performed by: EMERGENCY MEDICINE

## 2021-10-05 PROCEDURE — 258N000003 HC RX IP 258 OP 636: Performed by: RADIOLOGY

## 2021-10-05 PROCEDURE — 85025 COMPLETE CBC W/AUTO DIFF WBC: CPT | Performed by: EMERGENCY MEDICINE

## 2021-10-05 PROCEDURE — 250N000011 HC RX IP 250 OP 636: Performed by: EMERGENCY MEDICINE

## 2021-10-05 PROCEDURE — 272N000116 HC CATH CR1

## 2021-10-05 PROCEDURE — 258N000003 HC RX IP 258 OP 636: Performed by: EMERGENCY MEDICINE

## 2021-10-05 PROCEDURE — 84484 ASSAY OF TROPONIN QUANT: CPT | Performed by: EMERGENCY MEDICINE

## 2021-10-05 PROCEDURE — 82272 OCCULT BLD FECES 1-3 TESTS: CPT | Performed by: EMERGENCY MEDICINE

## 2021-10-05 PROCEDURE — 36005 INJECTION EXT VENOGRAPHY: CPT

## 2021-10-05 PROCEDURE — 250N000011 HC RX IP 250 OP 636: Performed by: RADIOLOGY

## 2021-10-05 PROCEDURE — 82040 ASSAY OF SERUM ALBUMIN: CPT | Performed by: EMERGENCY MEDICINE

## 2021-10-05 PROCEDURE — 272N000124 HC CATH CR11

## 2021-10-05 PROCEDURE — 99285 EMERGENCY DEPT VISIT HI MDM: CPT | Mod: 25

## 2021-10-05 PROCEDURE — 71045 X-RAY EXAM CHEST 1 VIEW: CPT

## 2021-10-05 PROCEDURE — 272N000566 HC SHEATH CR3

## 2021-10-05 PROCEDURE — 96365 THER/PROPH/DIAG IV INF INIT: CPT

## 2021-10-05 PROCEDURE — 200N000001 HC R&B ICU

## 2021-10-05 PROCEDURE — 96376 TX/PRO/DX INJ SAME DRUG ADON: CPT

## 2021-10-05 PROCEDURE — 272N000196 HC ACCESSORY CR5

## 2021-10-05 PROCEDURE — C9113 INJ PANTOPRAZOLE SODIUM, VIA: HCPCS | Performed by: EMERGENCY MEDICINE

## 2021-10-05 PROCEDURE — 85379 FIBRIN DEGRADATION QUANT: CPT | Performed by: EMERGENCY MEDICINE

## 2021-10-05 PROCEDURE — 96375 TX/PRO/DX INJ NEW DRUG ADDON: CPT

## 2021-10-05 PROCEDURE — 96366 THER/PROPH/DIAG IV INF ADDON: CPT

## 2021-10-05 PROCEDURE — 75820 VEIN X-RAY ARM/LEG: CPT

## 2021-10-05 PROCEDURE — 99254 IP/OBS CNSLTJ NEW/EST MOD 60: CPT | Performed by: SURGERY

## 2021-10-05 PROCEDURE — 255N000002 HC RX 255 OP 636: Performed by: RADIOLOGY

## 2021-10-05 PROCEDURE — 93005 ELECTROCARDIOGRAM TRACING: CPT

## 2021-10-05 PROCEDURE — 93971 EXTREMITY STUDY: CPT | Mod: LT

## 2021-10-05 RX ORDER — NALOXONE HYDROCHLORIDE 0.4 MG/ML
0.2 INJECTION, SOLUTION INTRAMUSCULAR; INTRAVENOUS; SUBCUTANEOUS
Status: DISCONTINUED | OUTPATIENT
Start: 2021-10-05 | End: 2021-10-05 | Stop reason: HOSPADM

## 2021-10-05 RX ORDER — AMOXICILLIN 250 MG
2 CAPSULE ORAL 2 TIMES DAILY PRN
Status: DISCONTINUED | OUTPATIENT
Start: 2021-10-05 | End: 2021-10-08 | Stop reason: HOSPADM

## 2021-10-05 RX ORDER — NALOXONE HYDROCHLORIDE 0.4 MG/ML
0.4 INJECTION, SOLUTION INTRAMUSCULAR; INTRAVENOUS; SUBCUTANEOUS
Status: DISCONTINUED | OUTPATIENT
Start: 2021-10-05 | End: 2021-10-05 | Stop reason: HOSPADM

## 2021-10-05 RX ORDER — DIPHENHYDRAMINE HYDROCHLORIDE 50 MG/ML
25 INJECTION INTRAMUSCULAR; INTRAVENOUS EVERY 4 HOURS PRN
Status: DISCONTINUED | OUTPATIENT
Start: 2021-10-05 | End: 2021-10-08 | Stop reason: HOSPADM

## 2021-10-05 RX ORDER — ACETAMINOPHEN 325 MG/1
650 TABLET ORAL EVERY 6 HOURS PRN
Status: DISCONTINUED | OUTPATIENT
Start: 2021-10-05 | End: 2021-10-08 | Stop reason: HOSPADM

## 2021-10-05 RX ORDER — IBUPROFEN 200 MG
400 TABLET ORAL EVERY 4 HOURS PRN
Status: ON HOLD | COMMUNITY
End: 2021-10-06

## 2021-10-05 RX ORDER — ONDANSETRON 4 MG/1
4 TABLET, ORALLY DISINTEGRATING ORAL EVERY 6 HOURS PRN
Status: DISCONTINUED | OUTPATIENT
Start: 2021-10-05 | End: 2021-10-05

## 2021-10-05 RX ORDER — SERTRALINE HYDROCHLORIDE 100 MG/1
100 TABLET, FILM COATED ORAL DAILY
Status: DISCONTINUED | OUTPATIENT
Start: 2021-10-06 | End: 2021-10-08 | Stop reason: HOSPADM

## 2021-10-05 RX ORDER — SODIUM CHLORIDE 9 MG/ML
INJECTION, SOLUTION INTRAVENOUS CONTINUOUS
Status: DISCONTINUED | OUTPATIENT
Start: 2021-10-05 | End: 2021-10-05

## 2021-10-05 RX ORDER — HEPARIN SODIUM 10000 [USP'U]/100ML
0-5000 INJECTION, SOLUTION INTRAVENOUS CONTINUOUS
Status: DISCONTINUED | OUTPATIENT
Start: 2021-10-05 | End: 2021-10-05

## 2021-10-05 RX ORDER — PROCHLORPERAZINE MALEATE 10 MG
10 TABLET ORAL EVERY 6 HOURS PRN
Status: DISCONTINUED | OUTPATIENT
Start: 2021-10-05 | End: 2021-10-08 | Stop reason: HOSPADM

## 2021-10-05 RX ORDER — ONDANSETRON 4 MG/1
4 TABLET, ORALLY DISINTEGRATING ORAL EVERY 6 HOURS PRN
Status: DISCONTINUED | OUTPATIENT
Start: 2021-10-05 | End: 2021-10-08 | Stop reason: HOSPADM

## 2021-10-05 RX ORDER — DOCUSATE SODIUM 100 MG/1
100 CAPSULE, LIQUID FILLED ORAL 2 TIMES DAILY
Status: DISCONTINUED | OUTPATIENT
Start: 2021-10-05 | End: 2021-10-08 | Stop reason: HOSPADM

## 2021-10-05 RX ORDER — DIPHENHYDRAMINE HCL 25 MG
25 CAPSULE ORAL EVERY 4 HOURS PRN
Status: DISCONTINUED | OUTPATIENT
Start: 2021-10-05 | End: 2021-10-08 | Stop reason: HOSPADM

## 2021-10-05 RX ORDER — ONDANSETRON 2 MG/ML
4 INJECTION INTRAMUSCULAR; INTRAVENOUS EVERY 6 HOURS PRN
Status: DISCONTINUED | OUTPATIENT
Start: 2021-10-05 | End: 2021-10-08 | Stop reason: HOSPADM

## 2021-10-05 RX ORDER — PROCHLORPERAZINE MALEATE 10 MG
10 TABLET ORAL EVERY 6 HOURS PRN
Status: DISCONTINUED | OUTPATIENT
Start: 2021-10-05 | End: 2021-10-05

## 2021-10-05 RX ORDER — METOCLOPRAMIDE HYDROCHLORIDE 5 MG/ML
10 INJECTION INTRAMUSCULAR; INTRAVENOUS EVERY 6 HOURS PRN
Status: DISCONTINUED | OUTPATIENT
Start: 2021-10-05 | End: 2021-10-08 | Stop reason: HOSPADM

## 2021-10-05 RX ORDER — LIDOCAINE 40 MG/G
CREAM TOPICAL
Status: DISCONTINUED | OUTPATIENT
Start: 2021-10-05 | End: 2021-10-08 | Stop reason: HOSPADM

## 2021-10-05 RX ORDER — AMOXICILLIN 250 MG
1 CAPSULE ORAL 2 TIMES DAILY PRN
Status: DISCONTINUED | OUTPATIENT
Start: 2021-10-05 | End: 2021-10-08 | Stop reason: HOSPADM

## 2021-10-05 RX ORDER — FLUMAZENIL 0.1 MG/ML
0.2 INJECTION, SOLUTION INTRAVENOUS
Status: DISCONTINUED | OUTPATIENT
Start: 2021-10-05 | End: 2021-10-05 | Stop reason: HOSPADM

## 2021-10-05 RX ORDER — PROCHLORPERAZINE 25 MG
25 SUPPOSITORY, RECTAL RECTAL EVERY 12 HOURS PRN
Status: DISCONTINUED | OUTPATIENT
Start: 2021-10-05 | End: 2021-10-08 | Stop reason: HOSPADM

## 2021-10-05 RX ORDER — FENTANYL CITRATE 50 UG/ML
25-50 INJECTION, SOLUTION INTRAMUSCULAR; INTRAVENOUS EVERY 5 MIN PRN
Status: DISCONTINUED | OUTPATIENT
Start: 2021-10-05 | End: 2021-10-05 | Stop reason: HOSPADM

## 2021-10-05 RX ORDER — SODIUM CHLORIDE 9 MG/ML
INJECTION, SOLUTION INTRAVENOUS CONTINUOUS
Status: DISCONTINUED | OUTPATIENT
Start: 2021-10-05 | End: 2021-10-07

## 2021-10-05 RX ORDER — ACETAMINOPHEN 650 MG/1
650 SUPPOSITORY RECTAL EVERY 6 HOURS PRN
Status: DISCONTINUED | OUTPATIENT
Start: 2021-10-05 | End: 2021-10-08 | Stop reason: HOSPADM

## 2021-10-05 RX ORDER — IOPAMIDOL 612 MG/ML
100 INJECTION, SOLUTION INTRAVASCULAR ONCE
Status: COMPLETED | OUTPATIENT
Start: 2021-10-05 | End: 2021-10-05

## 2021-10-05 RX ORDER — HEPARIN SODIUM 10000 [USP'U]/100ML
500 INJECTION, SOLUTION INTRAVENOUS CONTINUOUS
Status: DISCONTINUED | OUTPATIENT
Start: 2021-10-05 | End: 2021-10-06

## 2021-10-05 RX ORDER — METOCLOPRAMIDE 5 MG/1
10 TABLET ORAL EVERY 6 HOURS PRN
Status: DISCONTINUED | OUTPATIENT
Start: 2021-10-05 | End: 2021-10-08 | Stop reason: HOSPADM

## 2021-10-05 RX ORDER — PROCHLORPERAZINE 25 MG
25 SUPPOSITORY, RECTAL RECTAL EVERY 12 HOURS PRN
Status: DISCONTINUED | OUTPATIENT
Start: 2021-10-05 | End: 2021-10-05

## 2021-10-05 RX ORDER — ONDANSETRON 2 MG/ML
4 INJECTION INTRAMUSCULAR; INTRAVENOUS EVERY 6 HOURS PRN
Status: DISCONTINUED | OUTPATIENT
Start: 2021-10-05 | End: 2021-10-05

## 2021-10-05 RX ADMIN — LIDOCAINE HYDROCHLORIDE 4 ML: 10 INJECTION, SOLUTION INFILTRATION; PERINEURAL at 19:43

## 2021-10-05 RX ADMIN — HEPARIN SODIUM 1800 UNITS/HR: 10000 INJECTION, SOLUTION INTRAVENOUS at 16:17

## 2021-10-05 RX ADMIN — FENTANYL CITRATE 50 MCG: 50 INJECTION, SOLUTION INTRAMUSCULAR; INTRAVENOUS at 19:35

## 2021-10-05 RX ADMIN — FENTANYL CITRATE 50 MCG: 50 INJECTION, SOLUTION INTRAMUSCULAR; INTRAVENOUS at 19:20

## 2021-10-05 RX ADMIN — MIDAZOLAM HYDROCHLORIDE 1 MG: 1 INJECTION, SOLUTION INTRAMUSCULAR; INTRAVENOUS at 19:20

## 2021-10-05 RX ADMIN — ALTEPLASE 0.5 MG/HR: 2.2 INJECTION, POWDER, LYOPHILIZED, FOR SOLUTION INTRAVENOUS at 19:59

## 2021-10-05 RX ADMIN — IOPAMIDOL 20 ML: 612 INJECTION, SOLUTION INTRAVENOUS at 19:46

## 2021-10-05 RX ADMIN — PANTOPRAZOLE SODIUM 40 MG: 40 INJECTION, POWDER, FOR SOLUTION INTRAVENOUS at 12:49

## 2021-10-05 RX ADMIN — HEPARIN SODIUM 500 UNITS/HR: 10000 INJECTION, SOLUTION INTRAVENOUS at 19:55

## 2021-10-05 RX ADMIN — MIDAZOLAM HYDROCHLORIDE 1 MG: 1 INJECTION, SOLUTION INTRAMUSCULAR; INTRAVENOUS at 19:25

## 2021-10-05 RX ADMIN — FENTANYL CITRATE 50 MCG: 50 INJECTION, SOLUTION INTRAMUSCULAR; INTRAVENOUS at 19:25

## 2021-10-05 RX ADMIN — MIDAZOLAM HYDROCHLORIDE 1 MG: 1 INJECTION, SOLUTION INTRAMUSCULAR; INTRAVENOUS at 19:35

## 2021-10-05 RX ADMIN — SODIUM CHLORIDE 1000 ML: 9 INJECTION, SOLUTION INTRAVENOUS at 16:17

## 2021-10-05 RX ADMIN — SODIUM CHLORIDE: 9 INJECTION, SOLUTION INTRAVENOUS at 21:55

## 2021-10-05 ASSESSMENT — ENCOUNTER SYMPTOMS
ABDOMINAL PAIN: 0
VOMITING: 0
NAUSEA: 0
NUMBNESS: 0
BLOOD IN STOOL: 1

## 2021-10-05 ASSESSMENT — MIFFLIN-ST. JEOR
SCORE: 2016.22
SCORE: 2047.63

## 2021-10-05 NOTE — TELEPHONE ENCOUNTER
"Patient calls with a couple of concerns. He reports that he has had black, tarry stools for a couple of days. He denies any abdominal pain, is not dizzy or lightheaded. Today he also noticed that his left arm is swollen. The swelling is the whole arm. There is no redness or pain, just discomfort from the tightness. Patient denies any injury.    Go to ED Now per protocol. Patient verbalizes understanding and will go to the ED at this time.    Zhanna Leonardo RN  [facility]         Reason for Disposition    Tarry or jet black-colored stool    Tarry or jet black-colored stool (not dark green)    SEVERE arm swelling (e.g., all of arm looks swollen)    Additional Information    Negative: Shock suspected (e.g., cold/pale/clammy skin, too weak to stand, low BP, rapid pulse)    Negative: Difficult to awaken or acting confused (e.g., disoriented, slurred speech)    Negative: Passed out (i.e., lost consciousness, collapsed and was not responding)    Negative: [1] Vomiting AND [2] contains red blood or black (\"coffee ground\") material  (Exception: few red streaks in vomit that only happened once)    Negative: Sounds like a life-threatening emergency to the triager    Negative: Diarrhea is main symptom    Negative: Stool color other than brown or tan is main concern  (no bleeding and no melena)    Negative: SEVERE rectal bleeding (large blood clots; on and off, or constant bleeding)    Negative: SEVERE dizziness (e.g., unable to stand, requires support to walk, feels like passing out now)    Negative: [1] MODERATE rectal bleeding (small blood clots, passing blood without stool, or toilet water turns red) AND [2] more than once a day    Negative: Pale skin (pallor) of new onset or worsening    Negative: Severe difficulty breathing (e.g., struggling for each breath, speaks in single words)    Negative: Sounds like a life-threatening emergency to the triager    Negative: Chest pain    Negative: Followed an " arm injury    Negative: Small area of LOCALIZED swelling followed an insect bite to the area    Negative: Swelling of wrist is main problem    Negative: Swelling of elbow is main symptom    Negative: Cast problems or questions    Negative: Pain, redness, swelling, or pus at IV Site    Protocols used: STOOLS - UNUSUAL COLOR-A-AH, RECTAL BLEEDING-A-AH, ARM SWELLING AND EDEMA-A-AH    COVID 19 Nurse Triage Plan/Patient Instructions    Please be aware that novel coronavirus (COVID-19) may be circulating in the community. If you develop symptoms such as fever, cough, or SOB or if you have concerns about the presence of another infection including coronavirus (COVID-19), please contact your health care provider or visit [email or web address].     Disposition/Instructions    ED Visit recommended. Follow protocol based instructions.     Bring Your Own Device:  Please also bring your smart device(s) (smart phones, tablets, laptops) and their charging cables for your personal use and to communicate with your care team during your visit.    Thank you for taking steps to prevent the spread of this virus.  o Limit your contact with others.  o Wear a simple mask to cover your cough.  o Wash your hands well and often.    Resources    [facility]: About COVID-19: [email or web address]    CDC: What to Do If You're Sick: www.cdc.gov/coronavirus/2019-ncov/about/steps-when-sick.html    CDC: Ending Home Isolation: www.cdc.gov/coronavirus/2019-ncov/hcp/disposition-in-home-patients.html     CDC: Caring for Someone: www.cdc.gov/coronavirus/2019-ncov/if-you-are-sick/care-for-someone.html     [facility]: Interim Guidance for Hospital Discharge to Home: www.health.[facility].[place]/diseases/coronavirus/hcp/hospdischarge.pdf    [facility] clinical trials (COVID-19 research studies): [email or web address]     Below are the COVID-19 hotlines at the [place] Department of Health ([facility]). Interpreters  are available.   o For health questions: Call 265-799-7481 or 1-468.857.3985 (7 a.m. to 7 p.m.)  o For questions about schools and childcare: Call 289-883-9819 or 1-139.154.8262 (7 a.m. to 7 p.m.)

## 2021-10-05 NOTE — CONSULTS
VASCULAR SURGERY    CC: Left arm swelling, dark stools    HPI: 41-year-old very healthy male with a familial history of hyperlipidemia (recommended to start statin but has not done so yet) had noticed an episode of dark stools over the weekend.  He did take Pepto-Bismol.  No nausea, vomiting, abdominal pain.  Noted dark stools again today and came to the ED.    Patient is very healthy and active.  He normally works out lifting weights but has not done so for several months.  Started doing so again this past weekend.  For the last 2 to 3 days he has noted progressive left arm swelling and discomfort.  He is right-handed.  Except for lifting weights he has had no significant change in activity and has lifted weights in the past very actively.  Never had a prior episode of swelling.  In retrospect does note tingling of his hands when his arms are elevated but this is never been overly problematic.    PMH: No allergies.-Some issues with Vicodin   Medications: Zoloft, Lopid(has not initiated yet)   Medical: Hyperlipidemia (present in father and grandfather)    ASD repair at the age of 6 with no cardiac issues    Herniorrhaphy    Bipolar disorder and depression controlled with medication    Left anterior cruciate ligament repair 2016  Non-smoker.  Overall healthy diet.      Works as a  for [employer].    FMH: History of hyperlipidemia.    Exam: Seen in the emergency department.  Alert and appropriate.     Normal affect.  Very muscular and well-developed.     VSS  WZ=137/82 weight= 102 kg     HEENT= unremarkable.     Chest= clear to auscultation     Cardiovascular= regular rate     Abdomen= soft, nontender   .  Extremities= left arm swollen from wrist to shoulder.     Right arm is normal.    +3 left radial but does not decrease with arm elevated or    externally rotated.    Tingling in both arms 1 elevated at 180 degrees    Legs= varicose veins.  No swelling.  Normal CMS.     +3 PT pulses  bilaterally    Review of the ultrasound reveals occlusion of the left axillary/subclavian vein    Laboratory: K= 4.1   SCr= 1.06 lactic acid= 1.5 negative troponin    Glucose= 91 WBC= 7.2 Hgb= 14.2    7/28/2021 LDL= 125    Stool Hemoccult negative in the ED    Impression: #1.  Left axillary/subclavian vein thrombosis.  Very likely due to vasogenic TOS.  Patient has started lifting weights again which could have been the inciting cause.  Does have symptoms of mild neurogenic TOS implying a narrow thoracic outlet but of no clinical significance.    No evidence of GI bleeding.  Has been started on heparin.   Dr. Stroud of interventional radiology will evaluate the patient for left arm venogram and attempt to open up subclavian vein.    Eventually patient will require thoracic decompression surgery with a transaxillary first rib resection scalenectomy this is been discussed with he and his wife.  Timing this will depend on findings at the time of venogram.       #2.  Hyperlipidemia.  Very few risk factors however with family history would recommend outpatient to initiate a statin which was recommended by his primary care physician but has not yet started.  This was discussed with him.      45 minutes with patient in the ED today.       Jovi Ellison MD

## 2021-10-05 NOTE — H&P
[facility]    History and Physical - Hospitalist Service       Date of Admission:  10/5/2021    Assessment & Plan    Pool Ontiveros is a 41 year old male who is essentially healthy other than having depression hyperlipidemia.  He recently resumed a weight training regimen and noticed that his left arm felt a little tight.  In the emergency room he was found to have a left upper extremity DVT with thrombosis in the subclavian and axillary veins.  He was started on intravenous heparin, is being evaluated by interventional radiology and vascular surgery and is being admitted to the hospital.    Left upper extremity(subclavian and axillary) deep venous thrombosis likely due to thoracic outlet syndrome   The patient was started on IV heparin in the emergency room.  The interventional radiologist is going to perform a thrombectomy.  The vascular surgeon has seen him and he will likely have decompressive surgery.    Continue intravenous heparin     Chest X-ray    Interventional radiology and vascular surgery consultations     Hyperlipidemia   He has been prescribed gemfibrozil but has not started it.    Depression     Continue sertraline      Diet:  NPO for procedure  DVT Prophylaxis: IV heparin   Street Catheter: Not present  Central Lines: None  Code Status:   FULL    Clinically Significant Risk Factors Present on Admission                   Disposition Plan   Expected discharge:  recommended to prior living arrangement once evaluated and treated for DVT.     The patient's care was discussed with the Patient and Patient's Family.    Tony Manzo MD  [facility]  Securely message with the Vocera Web Console (learn more here)  Text page via [employer] Paging/Directory      ______________________________________________________________________    Chief Complaint   Left upper extremity swelling     History is obtained from the patient, electronic health record and  emergency department physician    History of Present Illness   Pool Ontiveros is a 41 year old male who has a history of hyperlipidemia for which he was prescribed gemfibrozil but has not started it.  He is on sertraline for depression.  Otherwise he is healthy.  He has done weight training for most of his adult life but had taken a hiatus for about 6 months and recently began a weight training regimen again.  This past weekend he took a Pepto-Bismol for heartburn and then noticed that he had dark stools.  This concerned him so he decided to come to the emergency room.  He also noticed that since he has begun the weight training his left arm is felt a little tight and may be a little swollen.  He has not had any fever, chills, chest pain or shortness of breath.  He has not had any swelling in his other extremities.  He has no history of venous thrombosis.  In the emergency room he had stable vital signs and a minimally swollen left upper extremity.  His EKG was normal.  An ultrasound of the left upper extremity showed occlusive thrombosis in the left subclavian and axillary veins.  He has superficial thrombus in the left cephalic vein.  His hemoglobin was 14 g and his stool occult blood was negative.  He has been started on intravenous heparin.  The on-call interventional radiologist was contacted for possible thrombectomy and the vascular surgeon was asked to see him since he likely has thoracic outlet syndrome.    Review of Systems    The 10 point Review of Systems is negative other than noted in the HPI or here.     Past Medical History    I have reviewed this patient's medical history and updated it with pertinent information if needed.   Past Medical History:   Diagnosis Date     Depression with anxiety 7/19/2016     Hernia, abdominal      History of atrial septal defect repair 3/5/2012    Overview:  repaired age 5      History of inguinal hernia repair 3/5/2012    Overview:  Age 6      Mixed hyperlipidemia  7/19/2016   no history of venous thromboembolism     Past Surgical History   I have reviewed this patient's surgical history and updated it with pertinent information if needed.  Past Surgical History:   Procedure Laterality Date     HERNIA REPAIR         Social History   I have reviewed this patient's social history and updated it with pertinent information if needed.  Social History     Tobacco Use     Smoking status: Never Smoker     Smokeless tobacco: Former User     Types: Chew     Tobacco comment: Gum, ocasionally    Substance Use Topics     Alcohol use: Yes     Comment: 9 drinks per week     Drug use: No       Family History   I have reviewed this patient's family history and updated it with pertinent information if needed.  Family History   Problem Relation Age of Onset     Bipolar Disorder Mother      Depression Mother    no history of venous thromboembolism     Prior to Admission Medications   Prior to Admission Medications   Prescriptions Last Dose Informant Patient Reported? Taking?   gemfibrozil (LOPID) 600 MG tablet Not Taking at Unknown time Self No No   Sig: Take 1 tablet (600 mg) by mouth 2 times daily Reported on 2/24/2017   Patient not taking: Reported on 10/5/2021   ibuprofen (ADVIL/MOTRIN) 200 MG tablet 10/3/2021 Self Yes Yes   Sig: Take 400 mg by mouth every 4 hours as needed for mild pain    sertraline (ZOLOFT) 100 MG tablet 10/5/2021 at am Self No Yes   Sig: TAKE 1 TABLET BY MOUTH EVERY DAY      Facility-Administered Medications: None     Allergies   Allergies   Allergen Reactions     Vicodin [Hydrocodone-Acetaminophen] Hives       Physical Exam   Vital Signs: Temp: 98.5  F (36.9  C) Temp src: Oral BP: 138/85 Pulse: 71   Resp: 20 SpO2: 98 % O2 Device: None (Room air)    Weight: 225 lbs 0 oz    Constitutional: awake, alert, cooperative, no apparent distress  Eyes: Lids and lashes normal, pupils equal, round, sclera clear, conjunctiva normal  ENT: Normocephalic, without obvious abnormality,  atraumatic  Hematologic / Lymphatic: no cervical lymphadenopathy   Respiratory: No increased work of breathing, good air exchange, clear to auscultation bilaterally, no crackles or wheezing  Cardiovascular:regular rate and rhythm, normal S1 and S2, no S3 or S4, and no murmur noted  GI: normal bowel sounds, soft, non-distended, non-tender, no masses palpated  Genitounirinary:    Skin: no rashes and no jaundice  Musculoskeletal: mild left upper extremity edema  Neurologic: Awake, alert, oriented to name, place and time.  Cranial nerves II-XII are grossly intact.  Motor is 5 out of 5 bilaterally.   Neuropsychiatric: General: normal, calm and normal eye contact    Data   Data reviewed today: I reviewed all medications, new labs and imaging results over the last 24 hours. I personally reviewed the EKG tracing showing NSR/normal.    Recent Labs   Lab 10/05/21  1237   WBC 7.2   HGB 14.2   MCV 91      INR 1.03      POTASSIUM 4.1   CHLORIDE 106   CO2 24   BUN 13   CR 1.06   ANIONGAP 10   NIYA 9.0   GLC 91   ALBUMIN 4.0   PROTTOTAL 7.7   BILITOTAL 0.5   ALKPHOS 67   ALT 31   AST 26   TROPONIN <0.015     7.2    \    14.2    /    181   N 76    L N/A    140    106    13 /   ------------------------------------ 91   ALT 31   AST 26   AP 67   ALB 4.0   Ca 9.0  4.1    24    1.06 \    % RETIC N/A    LDH N/A  Troponin <0.015    BNP N/A    CK N/A  INR 1.03   PTT N/A    D-dimer N/A    Fibrinogen N/A    Antithrombin N/A  Ferritin N/A  CRP N/A    IL-6 N/A  Recent Results (from the past 24 hour(s))   US Upper Extremity Venous Duplex Left    Narrative    US UPPER EXTREMITY VENOUS DUPLEX LEFT 10/5/2021 3:31 PM    CLINICAL HISTORY/INDICATION: Arm tightness, left.    COMPARISON:  None relevant    TECHNIQUE:   Grayscale, color-flow, and spectral waveform analysis were performed  of the deep veins of the left upper extremity    FINDINGS:   The left jugular vein demonstrates normal compressibility, color-flow  and spectral  waveform.    Occlusive deep vein thrombosis in the left subclavian and axillary  veins. The left brachial vein is patent without evidence of deep vein  thrombosis. Superficial thrombus is noted in the cephalic vein. The  basilic vein is patent.       Impression    IMPRESSION:   1. Occlusive deep vein thrombosis in the left subclavian and axillary  vein.  2. Superficial thrombus in the left cephalic vein.    Findings were discussed Dr. Loya in the ED at 3:43 PM on  10/5/2021.     LAZ LAM DO         SYSTEM ID:  [id number]

## 2021-10-05 NOTE — PHARMACY-ADMISSION MEDICATION HISTORY
Pharmacy Medication History  Admission medication history interview status for the 10/5/2021  admission is complete. See EPIC admission navigator for prior to admission medications     Location of Interview: Patient room  Medication history sources: Patient, Surescripts and Care Everywhere    Significant changes made to the medication list:  Added ibuprofen, not taking gemfibrozil    In the past week, patient estimated taking medication this percent of the time: greater than 90%    Additional medication history information:   Gemfibrozil: patient has been on it in previous years and then stopped taking. He was recently re-prescribed it again on 7/29/21. However, he has not picked it up at the pharmacy, stating that he doesn't wish to take it at this time.    Medication reconciliation completed by provider prior to medication history? No    Time spent in this activity: 15 minutes    Prior to Admission medications    Medication Sig Last Dose Taking? Auth Provider   ibuprofen (ADVIL/MOTRIN) 200 MG tablet Take 400 mg by mouth every 4 hours as needed for mild pain  10/3/2021 Yes Unknown, Entered By History   sertraline (ZOLOFT) 100 MG tablet TAKE 1 TABLET BY MOUTH EVERY DAY 10/5/2021 at am Yes Paco Bonilla MD   gemfibrozil (LOPID) 600 MG tablet Take 1 tablet (600 mg) by mouth 2 times daily Reported on 2/24/2017  Patient not taking: Reported on 10/5/2021 Not Taking at Unknown time  Paco Bonilla MD       The information provided in this note is only as accurate as the sources available at the time of update(s)

## 2021-10-05 NOTE — ED PROVIDER NOTES
History   Chief Complaint:  Melena and Joint Swelling       History is provided by the patient.    HPI   Pool Ontiveros is a 41 year old male with history of hernia and hyperlipidemia who presents with dark stools and left arm tightness. He says that Sunday he had taken 1 pepto bismol for heartburn and later that evening he had an episode of dark stools. He then had another episode yesterday and 1 today. He also provides that he started bench pressing again Sunday and over the last day, he has been having a left arm tightness and says it feels swollen. He does not have any pain in the arm or numbness or tingling or weakness. He denies any chest pain, nausea, vomiting, or abdominal pain. He denies any recent NSAID use. He drinks around 3-4 days a week. He does not smoke. He denies any history of GI bleeds or blood clots.    Review of Systems   Cardiovascular: Negative for chest pain.   Gastrointestinal: Positive for blood in stool. Negative for abdominal pain, nausea and vomiting.   Musculoskeletal:        (+) swelling and tightness of left arm.  (-) pain in left arm.   Neurological: Negative for numbness.   All other systems reviewed and are negative.    Allergies:  Vicodin    Medications:  Lopid  Zoloft    Past Medical History:    Depression  Hernia  Hyperlipidemia  Anxiety    Past Surgical History:    Hernia repair  Atrial septal defect repair    Family History:    Bipolar disorder  Depression    Social History:  Patient came from home.  Patient is unaccompanied in the ED.  Patient denies tobacco use.  Patient affirms alcohol use. Patient drinks 3-4 days a week.    Physical Exam     Patient Vitals for the past 24 hrs:   BP Temp Temp src Pulse Resp SpO2 Height Weight   10/05/21 1345 -- -- -- -- -- 98 % -- --   10/05/21 1330 138/85 -- -- 71 -- 98 % -- --   10/05/21 1315 -- -- -- -- -- 100 % -- --   10/05/21 1300 135/80 -- -- 71 -- 100 % -- --   10/05/21 1245 129/77 -- -- -- -- -- -- --   10/05/21 1103 129/70 98.5  " F (36.9  C) Oral 68 20 99 % 1.905 m (6' 3\") 102.1 kg (225 lb)       Physical Exam  VS: Reviewed per above  HENT: Mucous membranes moist  EYES: sclera anicteric  CV: Rate as noted, regular rhythm.   RESP: Effort normal. Breath sounds are normal bilaterally.  GI: no tenderness/rebound/guarding, not distended.  NEURO: Alert, moving all extremities.  5 out of 5 strength in left upper extremity distally, sensation intact to light touch in left upper extremity.  MSK: No deformity of the extremities.  Mild asymmetry/swelling of left upper extremity compared to the right.  Intact left radial pulse.  SKIN: Warm and dry    Emergency Department Course     ECG  ECG taken at 1449, ECG read at 1449  Normal sinus rhythm   Normal ECG   Rate 62 bpm. IL interval 158 ms. QRS duration 86 ms. QT/QTc 410/416 ms. P-R-T axes 52 -5 7.     Imaging:    US Upper Extremity Venous Duplex Left   Final Result   IMPRESSION:    1. Occlusive deep vein thrombosis in the left subclavian and axillary   vein.   2. Superficial thrombus in the left cephalic vein.      Findings were discussed Dr. Loya in the ED at 3:43 PM on   10/5/2021.       LAZ LAM DO            SYSTEM ID:  [id number]      XR Chest Port 1 View    (Results Pending)   IR Upper Extremity Venogram Left    (Results Pending)       Laboratory:    CBC: WBC 7.2, HGB 14.2,      CMP:  AWNL (Creatinine 1.06)    Troponin (Collected 1237): <0.015    Ddimer: Pending    Lactic acid (result time 1305) 1.5     Occult Blood Stool: Negative    INR: 1.03    ABO RH Type and Screen: A+, antibody Negative     Asymptomatic SARS-CoV-2 COVID-19 Virus by PCR: Negative    Emergency Department Course:    Reviewed:  I reviewed nursing notes, vitals, past medical history and care everywhere    Assessments:  1433 I obtained history and examined the patient as noted above.   1540 I rechecked the patient and explained findings.     Consults:   1541 I consulted with radiology and discussed patients " ultrasound results.  1556 I consulted with Dr. Manzo of the hospitalist service and discussed patient admission. They accepted care of the patient.    Interventions:  1249 Protonix 40 mg IV  1617 Heparin 8000 units IV  1617 Heparin 1800 units/hr IV  1617 NS 1000 mL IV    Disposition:  The patient was admitted to the hospital under the care of Dr. Manzo.       Impression & Plan       Medical Decision Making:  Patient presents to the ER for evaluation of dark stool as well as left arm tightness.  On arrival vital signs are reassuring.  On exam he does have some subtle swelling versus asymmetry of left lower extremity compared to the right.  No evidence of neurovascular compromise in the left upper extremity.  Low suspicion for compartment syndrome.    With respect to dark stools, hemoglobin is stable and Hemoccult testing is negative.  I do wonder if Pepto-Bismol was the cause of his darker stool.  At this time no evidence of GI bleed.    With respect to left upper extremity tightness, there is evidence of occlusive left subclavian and axillary vein DVT on ultrasound.  Radiology notified vascular surgery Dr. Ellison, who came to bedside to see the patient without plans for emergent surgical intervention.  I spoke with Dr. Almendarez of IR over the phone who will come to the hospital to perform likely catheter directed TPA this evening.  At signout to my colleague, patient was awaiting transfer to IR suite for further intervention.  Heparin infusion was started.  Of note when updating patient and family, it did appear that heparin was infusing via PIV in the left/affected upper extremity.  Thus I discussed with nursing staff about placing IV in the right upper extremity for ongoing heparin infusion there.      Covid-19  Pool Ontiveros was evaluated during a global COVID-19 pandemic, which necessitated consideration that the patient might be at risk for infection with the SARS-CoV-2 virus that causes COVID-19.    Applicable protocols for evaluation were followed during the patient's care.   COVID-19 was considered as part of the patient's evaluation. The plan for testing is:  a test was obtained during this visit.    Diagnosis:    ICD-10-CM    1. Acute deep vein thrombosis (DVT) of other vein of upper extremity, unspecified laterality (H)  I82.629        Discharge Medications:  New Prescriptions    No medications on file       Scribe Disclosure:  Raza KRISHNAMURTHY, am serving as a scribe at 11:51 AM on 10/5/2021 to document services personally performed by Florian Loya MD based on my observations and the provider's statements to me.            Florian Loya MD  10/05/21 9177

## 2021-10-06 ENCOUNTER — APPOINTMENT (OUTPATIENT)
Dept: INTERVENTIONAL RADIOLOGY/VASCULAR | Facility: CLINIC | Age: 41
DRG: 041 | End: 2021-10-06
Attending: RADIOLOGY
Payer: COMMERCIAL

## 2021-10-06 DIAGNOSIS — I82.629 ACUTE DEEP VEIN THROMBOSIS (DVT) OF OTHER VEIN OF UPPER EXTREMITY, UNSPECIFIED LATERALITY (H): Primary | ICD-10-CM

## 2021-10-06 LAB
ACT BLD: 227 SECONDS (ref 74–150)
ACT BLD: 82 SECONDS (ref 74–150)
ANION GAP SERPL CALCULATED.3IONS-SCNC: 5 MMOL/L (ref 3–14)
BASOPHILS # BLD AUTO: 0 10E3/UL (ref 0–0.2)
BASOPHILS NFR BLD AUTO: 1 %
BUN SERPL-MCNC: 18 MG/DL (ref 7–30)
CALCIUM SERPL-MCNC: 7.4 MG/DL (ref 8.5–10.1)
CHLORIDE BLD-SCNC: 109 MMOL/L (ref 94–109)
CO2 SERPL-SCNC: 25 MMOL/L (ref 20–32)
CREAT SERPL-MCNC: 1.06 MG/DL (ref 0.66–1.25)
EOSINOPHIL # BLD AUTO: 0.1 10E3/UL (ref 0–0.7)
EOSINOPHIL NFR BLD AUTO: 2 %
ERYTHROCYTE [DISTWIDTH] IN BLOOD BY AUTOMATED COUNT: 12.4 % (ref 10–15)
ERYTHROCYTE [DISTWIDTH] IN BLOOD BY AUTOMATED COUNT: 12.9 % (ref 10–15)
GFR SERPL CREATININE-BSD FRML MDRD: 87 ML/MIN/1.73M2
GLUCOSE BLD-MCNC: 113 MG/DL (ref 70–99)
GLUCOSE BLDC GLUCOMTR-MCNC: 99 MG/DL (ref 70–99)
HCT VFR BLD AUTO: 36.5 % (ref 40–53)
HCT VFR BLD AUTO: 39.3 % (ref 40–53)
HGB BLD-MCNC: 12.6 G/DL (ref 13.3–17.7)
HGB BLD-MCNC: 13.8 G/DL (ref 13.3–17.7)
IMM GRANULOCYTES # BLD: 0 10E3/UL
IMM GRANULOCYTES NFR BLD: 0 %
LYMPHOCYTES # BLD AUTO: 1.7 10E3/UL (ref 0.8–5.3)
LYMPHOCYTES NFR BLD AUTO: 28 %
MCH RBC QN AUTO: 30.9 PG (ref 26.5–33)
MCH RBC QN AUTO: 32.3 PG (ref 26.5–33)
MCHC RBC AUTO-ENTMCNC: 34.5 G/DL (ref 31.5–36.5)
MCHC RBC AUTO-ENTMCNC: 35.1 G/DL (ref 31.5–36.5)
MCV RBC AUTO: 90 FL (ref 78–100)
MCV RBC AUTO: 92 FL (ref 78–100)
MONOCYTES # BLD AUTO: 0.5 10E3/UL (ref 0–1.3)
MONOCYTES NFR BLD AUTO: 8 %
NEUTROPHILS # BLD AUTO: 3.8 10E3/UL (ref 1.6–8.3)
NEUTROPHILS NFR BLD AUTO: 61 %
NRBC # BLD AUTO: 0 10E3/UL
NRBC BLD AUTO-RTO: 0 /100
PLATELET # BLD AUTO: 142 10E3/UL (ref 150–450)
PLATELET # BLD AUTO: 171 10E3/UL (ref 150–450)
POTASSIUM BLD-SCNC: 4 MMOL/L (ref 3.4–5.3)
RBC # BLD AUTO: 4.08 10E6/UL (ref 4.4–5.9)
RBC # BLD AUTO: 4.27 10E6/UL (ref 4.4–5.9)
SODIUM SERPL-SCNC: 139 MMOL/L (ref 133–144)
UFH PPP CHRO-ACNC: 0.6 IU/ML
UFH PPP CHRO-ACNC: <0.1 IU/ML
WBC # BLD AUTO: 5.3 10E3/UL (ref 4–11)
WBC # BLD AUTO: 6.2 10E3/UL (ref 4–11)

## 2021-10-06 PROCEDURE — C1725 CATH, TRANSLUMIN NON-LASER: HCPCS

## 2021-10-06 PROCEDURE — 05CC4ZZ EXTIRPATION OF MATTER FROM LEFT BASILIC VEIN, PERCUTANEOUS ENDOSCOPIC APPROACH: ICD-10-PCS | Performed by: RADIOLOGY

## 2021-10-06 PROCEDURE — 85520 HEPARIN ASSAY: CPT | Performed by: STUDENT IN AN ORGANIZED HEALTH CARE EDUCATION/TRAINING PROGRAM

## 2021-10-06 PROCEDURE — 85027 COMPLETE CBC AUTOMATED: CPT | Performed by: RADIOLOGY

## 2021-10-06 PROCEDURE — 99152 MOD SED SAME PHYS/QHP 5/>YRS: CPT

## 2021-10-06 PROCEDURE — 250N000011 HC RX IP 250 OP 636: Performed by: RADIOLOGY

## 2021-10-06 PROCEDURE — 258N000003 HC RX IP 258 OP 636: Performed by: HOSPITALIST

## 2021-10-06 PROCEDURE — 99233 SBSQ HOSP IP/OBS HIGH 50: CPT | Performed by: HOSPITALIST

## 2021-10-06 PROCEDURE — 36415 COLL VENOUS BLD VENIPUNCTURE: CPT | Performed by: RADIOLOGY

## 2021-10-06 PROCEDURE — 36415 COLL VENOUS BLD VENIPUNCTURE: CPT | Performed by: STUDENT IN AN ORGANIZED HEALTH CARE EDUCATION/TRAINING PROGRAM

## 2021-10-06 PROCEDURE — 85025 COMPLETE CBC W/AUTO DIFF WBC: CPT | Performed by: RADIOLOGY

## 2021-10-06 PROCEDURE — 272N000116 HC CATH CR1

## 2021-10-06 PROCEDURE — C1769 GUIDE WIRE: HCPCS

## 2021-10-06 PROCEDURE — 250N000009 HC RX 250: Performed by: RADIOLOGY

## 2021-10-06 PROCEDURE — 99231 SBSQ HOSP IP/OBS SF/LOW 25: CPT | Mod: 57 | Performed by: SURGERY

## 2021-10-06 PROCEDURE — 272N000565 HC SHEATH CR23

## 2021-10-06 PROCEDURE — 272N000123 HC CATH CR9

## 2021-10-06 PROCEDURE — C1757 CATH, THROMBECTOMY/EMBOLECT: HCPCS

## 2021-10-06 PROCEDURE — 255N000002 HC RX 255 OP 636: Performed by: RADIOLOGY

## 2021-10-06 PROCEDURE — 250N000013 HC RX MED GY IP 250 OP 250 PS 637: Performed by: RADIOLOGY

## 2021-10-06 PROCEDURE — 80048 BASIC METABOLIC PNL TOTAL CA: CPT | Performed by: HOSPITALIST

## 2021-10-06 PROCEDURE — 250N000011 HC RX IP 250 OP 636: Performed by: STUDENT IN AN ORGANIZED HEALTH CARE EDUCATION/TRAINING PROGRAM

## 2021-10-06 PROCEDURE — 272N000566 HC SHEATH CR3

## 2021-10-06 PROCEDURE — 250N000013 HC RX MED GY IP 250 OP 250 PS 637: Performed by: INTERNAL MEDICINE

## 2021-10-06 PROCEDURE — 05764ZZ DILATION OF LEFT SUBCLAVIAN VEIN, PERCUTANEOUS ENDOSCOPIC APPROACH: ICD-10-PCS | Performed by: RADIOLOGY

## 2021-10-06 PROCEDURE — 05CA4ZZ EXTIRPATION OF MATTER FROM LEFT BRACHIAL VEIN, PERCUTANEOUS ENDOSCOPIC APPROACH: ICD-10-PCS | Performed by: RADIOLOGY

## 2021-10-06 PROCEDURE — 05CF4ZZ EXTIRPATION OF MATTER FROM LEFT CEPHALIC VEIN, PERCUTANEOUS ENDOSCOPIC APPROACH: ICD-10-PCS | Performed by: RADIOLOGY

## 2021-10-06 PROCEDURE — 85520 HEPARIN ASSAY: CPT | Performed by: RADIOLOGY

## 2021-10-06 PROCEDURE — 250N000013 HC RX MED GY IP 250 OP 250 PS 637: Performed by: HOSPITALIST

## 2021-10-06 PROCEDURE — 272N000302 HC DEVICE INFLATION CR5

## 2021-10-06 PROCEDURE — 200N000001 HC R&B ICU

## 2021-10-06 PROCEDURE — 258N000003 HC RX IP 258 OP 636: Performed by: RADIOLOGY

## 2021-10-06 PROCEDURE — 85347 COAGULATION TIME ACTIVATED: CPT

## 2021-10-06 PROCEDURE — 99223 1ST HOSP IP/OBS HIGH 75: CPT | Performed by: INTERNAL MEDICINE

## 2021-10-06 PROCEDURE — 05C64ZZ EXTIRPATION OF MATTER FROM LEFT SUBCLAVIAN VEIN, PERCUTANEOUS ENDOSCOPIC APPROACH: ICD-10-PCS | Performed by: RADIOLOGY

## 2021-10-06 RX ORDER — NALOXONE HYDROCHLORIDE 0.4 MG/ML
0.2 INJECTION, SOLUTION INTRAMUSCULAR; INTRAVENOUS; SUBCUTANEOUS
Status: DISCONTINUED | OUTPATIENT
Start: 2021-10-06 | End: 2021-10-06 | Stop reason: HOSPADM

## 2021-10-06 RX ORDER — FENTANYL CITRATE 50 UG/ML
25-50 INJECTION, SOLUTION INTRAMUSCULAR; INTRAVENOUS EVERY 5 MIN PRN
Status: DISCONTINUED | OUTPATIENT
Start: 2021-10-06 | End: 2021-10-06 | Stop reason: HOSPADM

## 2021-10-06 RX ORDER — FENOFIBRATE 160 MG/1
160 TABLET ORAL DAILY
Qty: 90 TABLET | Refills: 3 | Status: SHIPPED | OUTPATIENT
Start: 2021-10-06 | End: 2022-01-28

## 2021-10-06 RX ORDER — ROSUVASTATIN CALCIUM 20 MG/1
40 TABLET, COATED ORAL DAILY
Status: DISCONTINUED | OUTPATIENT
Start: 2021-10-06 | End: 2021-10-08 | Stop reason: HOSPADM

## 2021-10-06 RX ORDER — FENOFIBRATE 160 MG/1
160 TABLET ORAL DAILY
Status: DISCONTINUED | OUTPATIENT
Start: 2021-10-06 | End: 2021-10-08 | Stop reason: HOSPADM

## 2021-10-06 RX ORDER — HEPARIN SODIUM 10000 [USP'U]/100ML
0-5000 INJECTION, SOLUTION INTRAVENOUS CONTINUOUS
Status: DISCONTINUED | OUTPATIENT
Start: 2021-10-06 | End: 2021-10-07

## 2021-10-06 RX ORDER — HEPARIN SODIUM 200 [USP'U]/100ML
1 INJECTION, SOLUTION INTRAVENOUS CONTINUOUS PRN
Status: DISCONTINUED | OUTPATIENT
Start: 2021-10-06 | End: 2021-10-06 | Stop reason: HOSPADM

## 2021-10-06 RX ORDER — NALOXONE HYDROCHLORIDE 0.4 MG/ML
0.4 INJECTION, SOLUTION INTRAMUSCULAR; INTRAVENOUS; SUBCUTANEOUS
Status: DISCONTINUED | OUTPATIENT
Start: 2021-10-06 | End: 2021-10-06 | Stop reason: HOSPADM

## 2021-10-06 RX ORDER — IOPAMIDOL 612 MG/ML
100 INJECTION, SOLUTION INTRAVASCULAR ONCE
Status: COMPLETED | OUTPATIENT
Start: 2021-10-06 | End: 2021-10-06

## 2021-10-06 RX ORDER — ROSUVASTATIN CALCIUM 40 MG/1
40 TABLET, COATED ORAL DAILY
Qty: 90 TABLET | Refills: 3 | Status: SHIPPED | OUTPATIENT
Start: 2021-10-06 | End: 2022-01-28

## 2021-10-06 RX ORDER — FLUMAZENIL 0.1 MG/ML
0.2 INJECTION, SOLUTION INTRAVENOUS
Status: DISCONTINUED | OUTPATIENT
Start: 2021-10-06 | End: 2021-10-06 | Stop reason: HOSPADM

## 2021-10-06 RX ORDER — HEPARIN SODIUM 1000 [USP'U]/ML
1-10 INJECTION, SOLUTION INTRAVENOUS; SUBCUTANEOUS ONCE
Status: COMPLETED | OUTPATIENT
Start: 2021-10-06 | End: 2021-10-06

## 2021-10-06 RX ORDER — CEFAZOLIN SODIUM 2 G/100ML
2 INJECTION, SOLUTION INTRAVENOUS
Status: COMPLETED | OUTPATIENT
Start: 2021-10-07 | End: 2021-10-07

## 2021-10-06 RX ADMIN — MIDAZOLAM HYDROCHLORIDE 0.5 MG: 1 INJECTION, SOLUTION INTRAMUSCULAR; INTRAVENOUS at 14:21

## 2021-10-06 RX ADMIN — DIPHENHYDRAMINE HYDROCHLORIDE 25 MG: 25 CAPSULE ORAL at 17:53

## 2021-10-06 RX ADMIN — FENTANYL CITRATE 25 MCG: 50 INJECTION, SOLUTION INTRAMUSCULAR; INTRAVENOUS at 14:07

## 2021-10-06 RX ADMIN — MIDAZOLAM HYDROCHLORIDE 1 MG: 1 INJECTION, SOLUTION INTRAMUSCULAR; INTRAVENOUS at 13:38

## 2021-10-06 RX ADMIN — FENTANYL CITRATE 25 MCG: 50 INJECTION, SOLUTION INTRAMUSCULAR; INTRAVENOUS at 13:43

## 2021-10-06 RX ADMIN — MIDAZOLAM HYDROCHLORIDE 0.5 MG: 1 INJECTION, SOLUTION INTRAMUSCULAR; INTRAVENOUS at 13:43

## 2021-10-06 RX ADMIN — ROSUVASTATIN CALCIUM 40 MG: 20 TABLET, FILM COATED ORAL at 17:59

## 2021-10-06 RX ADMIN — MIDAZOLAM HYDROCHLORIDE 0.5 MG: 1 INJECTION, SOLUTION INTRAMUSCULAR; INTRAVENOUS at 13:48

## 2021-10-06 RX ADMIN — ALTEPLASE 0.5 MG/HR: 2.2 INJECTION, POWDER, LYOPHILIZED, FOR SOLUTION INTRAVENOUS at 06:39

## 2021-10-06 RX ADMIN — FENTANYL CITRATE 25 MCG: 50 INJECTION, SOLUTION INTRAMUSCULAR; INTRAVENOUS at 14:16

## 2021-10-06 RX ADMIN — MIDAZOLAM HYDROCHLORIDE 0.5 MG: 1 INJECTION, SOLUTION INTRAMUSCULAR; INTRAVENOUS at 14:13

## 2021-10-06 RX ADMIN — MIDAZOLAM HYDROCHLORIDE 0.5 MG: 1 INJECTION, SOLUTION INTRAMUSCULAR; INTRAVENOUS at 13:55

## 2021-10-06 RX ADMIN — FENTANYL CITRATE 25 MCG: 50 INJECTION, SOLUTION INTRAMUSCULAR; INTRAVENOUS at 14:21

## 2021-10-06 RX ADMIN — FENOFIBRATE 160 MG: 160 TABLET ORAL at 21:13

## 2021-10-06 RX ADMIN — MIDAZOLAM HYDROCHLORIDE 0.5 MG: 1 INJECTION, SOLUTION INTRAMUSCULAR; INTRAVENOUS at 14:31

## 2021-10-06 RX ADMIN — DOCUSATE SODIUM 100 MG: 100 CAPSULE, LIQUID FILLED ORAL at 17:53

## 2021-10-06 RX ADMIN — FENTANYL CITRATE 25 MCG: 50 INJECTION, SOLUTION INTRAMUSCULAR; INTRAVENOUS at 14:09

## 2021-10-06 RX ADMIN — FENTANYL CITRATE 25 MCG: 50 INJECTION, SOLUTION INTRAMUSCULAR; INTRAVENOUS at 13:48

## 2021-10-06 RX ADMIN — FENTANYL CITRATE 25 MCG: 50 INJECTION, SOLUTION INTRAMUSCULAR; INTRAVENOUS at 14:31

## 2021-10-06 RX ADMIN — MIDAZOLAM HYDROCHLORIDE 0.5 MG: 1 INJECTION, SOLUTION INTRAMUSCULAR; INTRAVENOUS at 14:01

## 2021-10-06 RX ADMIN — FENTANYL CITRATE 25 MCG: 50 INJECTION, SOLUTION INTRAMUSCULAR; INTRAVENOUS at 13:55

## 2021-10-06 RX ADMIN — HEPARIN SODIUM 10000 UNITS: 1000 INJECTION INTRAVENOUS; SUBCUTANEOUS at 13:52

## 2021-10-06 RX ADMIN — HEPARIN SODIUM 1500 UNITS/HR: 10000 INJECTION, SOLUTION INTRAVENOUS at 23:32

## 2021-10-06 RX ADMIN — MIDAZOLAM HYDROCHLORIDE 0.5 MG: 1 INJECTION, SOLUTION INTRAMUSCULAR; INTRAVENOUS at 14:07

## 2021-10-06 RX ADMIN — IOPAMIDOL 45 ML: 612 INJECTION, SOLUTION INTRAVENOUS at 14:43

## 2021-10-06 RX ADMIN — SODIUM CHLORIDE: 9 INJECTION, SOLUTION INTRAVENOUS at 00:01

## 2021-10-06 RX ADMIN — SERTRALINE HYDROCHLORIDE 100 MG: 100 TABLET ORAL at 17:54

## 2021-10-06 RX ADMIN — LIDOCAINE HYDROCHLORIDE 7 ML: 10 INJECTION, SOLUTION INFILTRATION; PERINEURAL at 14:52

## 2021-10-06 RX ADMIN — FENTANYL CITRATE 50 MCG: 50 INJECTION, SOLUTION INTRAMUSCULAR; INTRAVENOUS at 13:38

## 2021-10-06 RX ADMIN — FENTANYL CITRATE 25 MCG: 50 INJECTION, SOLUTION INTRAMUSCULAR; INTRAVENOUS at 14:01

## 2021-10-06 ASSESSMENT — MIFFLIN-ST. JEOR: SCORE: 2047.63

## 2021-10-06 ASSESSMENT — ACTIVITIES OF DAILY LIVING (ADL)
ADLS_ACUITY_SCORE: 9

## 2021-10-06 NOTE — PLAN OF CARE
VSS. SR. Left arm swelling/pulses unchanged (+2, +2). On heparin gtt. IR today and sheath removed. Plan for surgery tomorrow.

## 2021-10-06 NOTE — PLAN OF CARE
Neuro: A&Ox4. Neuros intact.  CV: SR. BP stable.  Resp: on RA. Denies SOB.  GI/: NPO at midnight. Voiding in toilet.  Skin: Intact. LUE +1/+2 edema, pink/flushed.   Pain/Gtts: Alteplase and Heparin infusing. Denies pain. Arm palpable, measuring 39.5cm  Family: Wife updated.   POC: Back to IR in morning.

## 2021-10-06 NOTE — PROGRESS NOTES
"VASCULAR SURGERY PROGRESS NOTE    Subjective:  He states the left hand edema has worsened, I elevated left arm with pillows.  Difficulty with motor of the left hand secondary to swelling.  Lytics running 0.5 g/h with heparin infusing and sheath.    Objective:  Intake/Output Summary (Last 24 hours) at 10/6/2021 0742  Last data filed at 10/6/2021 0600  Gross per 24 hour   Intake 1473.15 ml   Output --   Net 1473.15 ml     Labs:  ROUTINE IP LABS (Last four results)  BMP  Recent Labs   Lab 10/06/21  0718 10/06/21  0505 10/05/21  2042 10/05/21  1237   NA  --  139  --  140   POTASSIUM  --  4.0  --  4.1   CHLORIDE  --  109  --  106   NIYA  --  7.4*  --  9.0   CO2  --  25  --  24   BUN  --  18  --  13   CR  --  1.06  --  1.06   GLC 99 113* 92 91     CBC  Recent Labs   Lab 10/06/21  0505 10/05/21  1237   WBC 6.2 7.2   RBC 4.27* 4.59   HGB 13.8 14.2   HCT 39.3* 41.9   MCV 92 91   MCH 32.3 30.9   MCHC 35.1 33.9   RDW 12.9 12.7    181     INR  Recent Labs   Lab 10/05/21  1237   INR 1.03     PHYSICAL EXAM:  /79   Pulse 68   Temp 97.7  F (36.5  C) (Oral)   Resp 15   Ht 1.905 m (6' 3\")   Wt 105.2 kg (231 lb 14.8 oz)   SpO2 95%   BMI 28.99 kg/m    General: The patient is alert and oriented. Appropriate. No acute distress  Psych: Pleasant affect, Answers questions appropriately  Skin: Color appropriate for race, warm, dry.  Respiratory: Breathing unlabored  GI:  Abdomen soft, nontender to light palpation.  Extremities: Left hand and arm swelling, palpable radial pulse, grossly motor intact    Imaging:   No new imaging.    ASSESSMENT:  41-year-old male with hyperlipidemia presents with left arm swelling, found to have left subclavian vein DVT and suspected vasogenic TOS. Continues w/ LUE swelling.    PLAN:  N.p.o. for venogram today  Continue lytics  Elevate left upper extremity  Continue ICU while lytics catheter is in place, transfer to floor if removed      Aidee Lucas MD  Vascular Surgery Fellow  Pager: [phone]

## 2021-10-06 NOTE — IR NOTE
RADIOLOGY POST PROCEDURE NOTE    Patient name: Pool Ontiveros  MRN: [id number]  : 1980    Pre-procedure diagnosis: DVT throughout LUE  Post-procedure diagnosis: Same    Procedure Date/Time: 2021  8:19 PM  Procedure: LUE venogram.    Definite stenosis/abnormality in subclavian vein between the clavicle and the first rib.  Central Veins are patent.  Extensive thrombus throughout the basilic and brachial veins.  Therefore, a 40 cm infusion catheter was placed, into the Basilic Vein and redirected retrograde into the brachial vein.    tPA through catheter at 0.5 mg/hour and heparin through 6 Fr basilic vein sheath at 500 U/hour.    Followup venogram tomorrow.  NPO after midnight (may eat now).  If thrombus is resolved, hope is that able to cross the subclavian vein and treat with angioplasty.  Suspect patient would need TOS surgery, though confirm once thrombus has resolved.    Updated patient's wife.        Estimated blood loss: 5 ml  Specimen(s) collected with description: none    The patient tolerated the procedure well with no immediate complications.  Significant findings:  Please see above.    See imaging dictation for procedural details.    Provider name: Oskar Loomis MD  Assistant(s):None

## 2021-10-06 NOTE — PROGRESS NOTES
"  Interventional Radiology - Progress Note  Inpatient - [facility]  10/6/2021    S:  C/O increased distal LUE swelling overnight. LUE is now recently elevated above level of heart. No other complaints. Feeling well.      O:  BP (!) 156/84   Pulse 64   Temp 97.9  F (36.6  C) (Axillary)   Resp 19   Ht 1.905 m (6' 3\")   Wt 105.2 kg (231 lb 14.8 oz)   SpO2 98%   BMI 28.99 kg/m    General:  Stable.  In no acute distress.    Neuro:  A&O x 3. Moves all extremities equally.  Heart: RRR  Lungs: No increased work of breathing  Ext: Infusion catheter LUE, sensorimotor intact distal LUE, +swelling distal LUE    Imaging:  LUE Venogram with thrombolysis 10/5/21:  \"Procedure Date/Time: October 5, 2021  8:19 PM  Procedure: LUE venogram.     Definite stenosis/abnormality in subclavian vein between the clavicle and the first rib.  Central Veins are patent.  Extensive thrombus throughout the basilic and brachial veins.  Therefore, a 40 cm infusion catheter was placed, into the Basilic Vein and redirected retrograde into the brachial vein.     tPA through catheter at 0.5 mg/hour and heparin through 6 Fr basilic vein sheath at 500 U/hour.     Followup venogram tomorrow.  NPO after midnight (may eat now).  If thrombus is resolved, hope is that able to cross the subclavian vein and treat with angioplasty.  Suspect patient would need TOS surgery, though confirm once thrombus has resolved.\"    Labs (Last four results)  CMPRecent Labs   Lab 10/06/21  0718 10/06/21  0505 10/05/21  2042 10/05/21  1237   POTASSIUM  --  4.0  --  4.1   GLC 99 113* 92 91   BUN  --  18  --  13   CR  --  1.06  --  1.06   GFRESTIMATED  --  87  --  87     CBC  Recent Labs   Lab 10/06/21  0505 10/05/21  1237   WBC 6.2 7.2   RBC 4.27* 4.59   HGB 13.8 14.2   HCT 39.3* 41.9    181     INR  Recent Labs   Lab 10/05/21  1237   INR 1.03       A:  Thoracic Outlet Syndrome  LUE DVT s/p venogram with initiation of thrombolysis 10/5/21    P:    Follow up " venogram today scheduled for 1300  Keep NPO as ordered, D/W RN  Continue tPA at 0.5mg/hr, Heparin IV, monitor for signs of bleeding    Manolo Payton PA-C  Interventional Radiology  *[phone]      Total time spent on the date of the encounter is 20 minutes, including time spent counseling the patient, performing a medically appropriate evaluation, reviewing prior medical history, ordering medications and tests, documenting clinical information in the medical record, and communication of results.

## 2021-10-06 NOTE — PROGRESS NOTES
VASCULAR SURGERY    Left arm venogram and intervention just completed.  Near complete lysis of thrombus noted.  Significant narrowing left subclavian vein at thoracic inlet.  Venoplasty with 10 x 4 balloon with significant rebound.  Complete occlusion of the left subclavian vein with arm abducted.  Tolerated procedure well.    Images reviewed with Dr. Carlos Riley-interventional radiology      With these findings short-term patency will be quite limited.  Thus plan thoracic decompression tomorrow afternoon.  We will keep on intravenous heparin until preinduction.  Discussed with patient..      Jovi Ellison MD

## 2021-10-06 NOTE — PROGRESS NOTES
41-year-old male admitted to the ICU status post left upper extremity venogram for stenosis and thrombus in left subclavian basilic and brachial veins.  Patient scheduled for follow-up venogram tomorrow.  I briefly met with the patient he is awake alert following commands tolerating p.o. diet.  At this time there is no critical care needs, I will follow from the periphery.  If any critical care needs arise I will be more than happy to see the patient.    Juliocesar Hook MD  Critical Care Medicine

## 2021-10-06 NOTE — IR NOTE
Interventional Radiology Pre-Procedure Sedation Assessment   Time of Assessment: 7:20 PM    Expected Level: Moderate Sedation    Indication: Sedation is required for the following type of Procedure: Venous    Sedation and procedural consent: Risks, benefits and alternatives were discussed with Patient    PO Intake: Appropriately NPO for procedure    ASA Class: 3    Mallampati: Grade 2:  Soft palate, base of uvula, tonsillar pillars, and portion of posterior pharyngeal wall visible    Lungs: Lungs Clear with good breath sounds bilaterally    Heart: Normal heart sounds and rate    History and physical reviewed and no updates needed. I have reviewed the lab findings, diagnostic data, medications, and the plan for sedation. I have determined this patient to be an appropriate candidate for the planned sedation and procedure and have reassessed the patient IMMEDIATELY PRIOR to sedation and procedure.    Oskar Loomis MD

## 2021-10-06 NOTE — PRE-PROCEDURE
GENERAL PRE-PROCEDURE:   Procedure:  LUE follow up venogram with possible thrombectomy, thrombolysis  Date/Time:  10/6/2021 8:48 AM    Written consent obtained?: Yes    Risks and benefits: Risks, benefits and alternatives were discussed    Consent given by:  Patient  Patient states understanding of procedure being performed: Yes    Patient's understanding of procedure matches consent: Yes    Procedure consent matches procedure scheduled: Yes    Expected level of sedation:  Moderate  Appropriately NPO:  Yes  ASA Class:  3  Mallampati  :  Grade 1- soft palate, uvula, tonsillar pillars, and posterior pharyngeal wall visible  Lungs:  Lungs clear with good breath sounds bilaterally  Heart:  Normal heart sounds and rate  History & Physical reviewed:  History and physical reviewed and no updates needed  Statement of review:  I have reviewed the lab findings, diagnostic data, medications, and the plan for sedation

## 2021-10-06 NOTE — PROGRESS NOTES
Vascular Surgery Progress Note    S: In ICU overnight due to lytic therapy for left axillary/brachial/subclavian DVT.  Very comfortable.  No shortness of breath.    O:   Vitals:  BP  Min: 119/68  Max: 156/84  Temp  Av  F (36.7  C)  Min: 97.7  F (36.5  C)  Max: 98.5  F (36.9  C)  Pulse  Av.9  Min: 53  Max: 77  I/O last 3 completed shifts:  In: 1473.15 [P.O.:250; I.V.:1223.15]  Out: -     Physical Exam: Alert and appropriate.  Comfortable.   Persistent left arm swelling with normal CMS.    Reviewed venogram with patient.  They were able to pass a catheter through the occluded axillary/subclavian vein into the innominate vein.  Of left infusion catheter in the dual thrombosed brachial veins.          Assessment/Plan: Vasogenic left TOS.  Undergoing lytic therapy at this time of the occluded brachial veins.  Plan repeat venogram to open up the axillary/subclavian vein this afternoon.  Further treatment will depend on these findings though patient will eventually need thoracic decompression surgery which we discussed again today.      Wm. Scot MD

## 2021-10-06 NOTE — PROCEDURES
[facility]    Procedure: Lysis check     Date/Time: 10/6/2021 2:55 PM  Performed by: Eamon Riley MD  Authorized by: Eamon Riley MD     UNIVERSAL PROTOCOL   Site Marked: NA  Prior Images Obtained and Reviewed:  Yes  Required items: Required blood products, implants, devices and special equipment available    Patient identity confirmed:  Verbally with patient  Patient was reevaluated immediately before administering moderate or deep sedation or anesthesia  Confirmation Checklist:  Patient's identity using two indicators, relevant allergies and procedure was appropriate and matched the consent or emergent situation  Time out: Immediately prior to the procedure a time out was called    Universal Protocol: the Joint Commission Universal Protocol was followed    Preparation: Patient was prepped and draped in usual sterile fashion    ESBL (mL):  10         ANESTHESIA    Anesthesia: Local infiltration  Local Anesthetic:  Lidocaine 1% without epinephrine  Anesthetic Total (mL):  8      SEDATION    Patient Sedated: Yes    Sedation Type:  Moderate (conscious) sedation  Sedation:  Fentanyl, midazolam and see MAR for details  Vital signs: Vital signs monitored during sedation    See dictated procedure note for full details.  PROCEDURE   Patient Tolerance:  Patient tolerated the procedure well with no immediate complications  Describe Procedure:     Improved thrombus burden after lysis, significantly improved after mechanical thrombectomy    Angioplasty to elastic stenosis at the medial subclavian vein, only minimal improvement.    Abduction and Adduction views obtained, occlusion of the subclavian vein with arm up.    Sheath removed and heparin therapeutic infusion started.  Length of time physician/provider present for 1:1 monitoring during sedation: 75

## 2021-10-06 NOTE — IR NOTE
"Interventional Radiology Intra-procedural Nursing Note    Patient Name: Pool Ontiveros  Medical Record Number: [id number]  Today's Date: October 5, 2021    Start Time: 1925  End of procedure time: 1940  Procedure: left upper extremity venogram, thrombolytic infusion  Report given to: JOAO Romano, ICU  Time pt departs:  2012  : n/a    Other Notes:     Patient arrives to Ir suite 1. Identification confirmed and consent verified. Patient was then assisted onto procedure table, positioned safely and connected to monitoring equipment. Access site left arm was prepped and patient draped under sterile technique.     Versed 3mg IV  Fentanyl 150mcg IV  tPA 0.5mg/hr through infusion catheter (left basilic, brachial vein)  Heparin 500 units/hr through venous sheath.    Patient tolerated procedure well. VSS. Patient alert, respirations regular and unlabored, no c/o pain at this time.   Procedure access site left upper arm is c/d/i , sheath and infusion catheter secured with tegaderm, arm board placed for support.    LUE measurement 39cm  Pt reports \"tightness\" sensation in left arm. Denies numbness or tingling specifically. Palpable radial pulse. Fingers pink and warm.    Patient transferred to ICU in stable condition accompanied by ICU RN and flying squad.      Silvina Lincoln RN  Interventional Radiology    "

## 2021-10-06 NOTE — IR NOTE
Interventional Radiology Intra-procedural Nursing Note    Patient Name: Pool Ontiveros  Medical Record Number: [id number]  Today's Date: October 6, 2021    Start Time: 1338  End of procedure time: 1449  Procedure: Thrombolytic follow up  Report given to RN    Other Notes: Pt into IR suite 1 via cart heparin and alteplase infusing and stopped. Pt awake and alert. To table in supine position prepped and drapped with 2%. VSS. Tele NSR. Dr. Riley in room. Time out and procedure started. Pt tolerated procedure well. Debrief with Dr. Riley. Manual pressure held. Dressing CDI. No complications. Pt transferred back to ICU. Report given over the phone.    Medications: Last sedation given at 1431    Versed 5mg  Fentanyl 275mcg  Lidocaine 1% 7ml  Heparin 10,000units    Brigid Grant RN

## 2021-10-06 NOTE — PROGRESS NOTES
[facility]    Medicine Progress Note - Hospitalist Service       Date of Admission:  10/5/2021    Assessment & Plan         Pool Ontiveros is a 41 year old male who is essentially healthy other than having depression hyperlipidemia.  He recently resumed a weight training regimen and noticed that his left arm felt a little tight.  In the emergency room he was found to have a left upper extremity DVT with thrombosis in the subclavian and axillary veins.  He was started on intravenous heparin, is being evaluated by interventional radiology and vascular surgery and is being admitted to the hospital.     Left upper extremity(subclavian and axillary) deep venous thrombosis likely due to thoracic outlet syndrome   S/P IR thrombolysis 10/5 and IR mechanical thrombectomy 10/6  The patient was started on IV heparin in the emergency room.  The interventional radiologist is going to perform a thrombectomy.  The vascular surgeon has seen him and he will likely have decompressive surgery.    Continue intravenous heparin     Chest X-ray    Interventional radiology and vascular surgery consultations     Plan for TOS surgery 10/7, npo at midnight - pre/post operative care protocols per surgery     Hyperlipidemia   He has been prescribed gemfibrozil but has not started it.     Depression     Continue sertraline      COVID 19 screening  Low suspicion. PCR negative 10/5      Diet: NPO per Anesthesia Guidelines for Procedure/Surgery Except for: Meds  Regular Diet Adult    DVT Prophylaxis: Heparin gtt continued  Street Catheter: Not present  Central Lines: None  Code Status: Full Code      Disposition Plan   Expected discharge: Unclear, pending further intervention with IR, vascular surgery, and thoracic surgery.  Likely 2+ days.  :     The patient's care was discussed with the Bedside Nurse, Patient and Patient's Family.    Total encounter time greater than 35 minutes with over 50% spent in direct patient care,  counseling, and coordination of care.    Blake Aguirre MD  Hospitalist Service  [facility]  Securely message with the [employer] Web Console (learn more here)  Text page via [employer] Paging/Directory      Clinically Significant Risk Factors Present on Admission               ______________________________________________________________________    Interval History   Patient admitted last night and I assumed care today.  Patient seen with spouse at bedside in the ICU after IR mechanical thrombectomy with substantial clot removed.  Heparin infusion continues.  Patient denies any pain, shortness of breath, or fevers.  Left upper extremity is distally neurovascularly intact.  Plan is for thoracic outlet syndrome surgery tomorrow.    Data reviewed today: I reviewed all medications, new labs and imaging results over the last 24 hours. I personally reviewed no images or EKG's today.    Physical Exam   Vital Signs: Temp: 98  F (36.7  C) Temp src: Oral BP: (!) 161/89 Pulse: 57   Resp: 19 SpO2: 99 % O2 Device: None (Room air) Oxygen Delivery: 2 LPM  Weight: 231 lbs 14.78 oz    Gen: NAD, pleasant  HEENT: Normocephalic, EOMI, MMM  Resp: no crackles,  no wheezes, no increased work of resp  CV: S1S2 heard, reg rhythm, reg rate, no pedal edema  Abdo: soft, nontender, nondistended, bowel sounds present  Ext: calves nontender, well perfused; LUE NVI distally, upper arm ace wrapped, cdi  Neuro: AAOx3, CN grossly intact, no facial asymmetry      Data   Recent Labs   Lab 10/06/21  1535 10/06/21  0718 10/06/21  0505 10/05/21  2042 10/05/21  1237   WBC 5.3  --  6.2  --  7.2   HGB 12.6*  --  13.8  --  14.2   MCV 90  --  92  --  91   *  --  171  --  181   INR  --   --   --   --  1.03   NA  --   --  139  --  140   POTASSIUM  --   --  4.0  --  4.1   CHLORIDE  --   --  109  --  106   CO2  --   --  25  --  24   BUN  --   --  18  --  13   CR  --   --  1.06  --  1.06   ANIONGAP  --   --  5  --  10   NIYA  --   --   7.4*  --  9.0   GLC  --  99 113* 92 91   ALBUMIN  --   --   --   --  4.0   PROTTOTAL  --   --   --   --  7.7   BILITOTAL  --   --   --   --  0.5   ALKPHOS  --   --   --   --  67   ALT  --   --   --   --  31   AST  --   --   --   --  26   TROPONIN  --   --   --   --  <0.015     Recent Results (from the past 24 hour(s))   IR Angiogram through Catheter Follow Up    Narrative    DATE: 10/6/2021    PROCEDURE:  LEFT LOWER EXTREMITY VENOGRAM   MECHANICAL THROMBECTOMY OF LEFT SUBCLAVIAN VEIN  THROMBOLYSIS CHECK AND TERMINATION OF THROMBOLYSIS.  MODERATE SEDATION    INTERVENTIONAL RADIOLOGIST: Eamon Riley MD    INDICATION: 41-year-old man with acute presentation of left subclavian  vein thrombosis in the setting of thoracic outlet syndrome. Underwent  initiation of thrombolysis on 10/5/2021. Here for lysis check and  possible completion thrombectomy.    CONSENT: The risks, benefits and alternatives of planned procedure  were discussed with the patient  in detail. All questions were  answered. Informed consent was given to proceed with the procedure.    MODERATE SEDATION: Versed 5 mg IV; Fentanyl to 75 mcg IV. During the  time out, immediately prior to the administration of medications, the  patient was reassessed for adequacy to receive conscious sedation.   Under physician supervision, Versed and fentanyl were administered for  moderate sedation. Pulse oximetry, heart rate and blood pressure were  continuously monitored by an independent trained observer. The  physician spent 71 minutes of face-to-face sedation time with the  patient.    CONTRAST: 45 mL  ADDITIONAL MEDICATIONS: 09526 units Heparin IV    FLUOROSCOPIC TIME: 8.5 minutes.  RADIATION DOSE: Air Kerma: 162 mGy.    COMPLICATIONS: No immediate complications.    STERILE BARRIER TECHNIQUE: Maximum sterile barrier technique was used.  Cutaneous antisepsis was performed at the operative site with  application of 2% chlorhexidine and large sterile drape. Prior to  the  procedure, the  and assistant performed hand hygiene and wore  hat, mask, sterile gown, and sterile gloves during the entire  procedure.    PROCEDURE: Left basilic vein sheath was inspected and appeared intact.  The existing 5 Lao infusion catheter was injected and a left upper  extremity and central venograms were obtained. A 5 Lao catheter and  Glidewire were manipulated into the right external iliac vein. Repeat  diagnostic venograms were performed through a 5 Lao catheter at the  level of the left subclavian vein.    Exchange Amplatz wire was advanced into the right external iliac vein.  6 Lao sheath was exchanged for the 13 Lao ClotTriever sheath  using sequential dilation. The ClotTriever catheter was inserted and 3  passes were made from the left brachiocephalic vein to the left  basilic vein. Following this, balloon angioplasty utilizing a 10 x 4  mm high pressure angioplasty balloon was inflated throughout the  subclavian vein with visible waist at the site of previously seen  severe stenosis. There is near immediate elastic recoil on post  angioplasty venogram. Repeat ClotTriever mechanical thrombectomy was  performed 2 times through the stenotic segment with only minimal  residual thrombus removed.    Completion venograms were performed with the left arm in neutral,  series 13, arm down, series 14, and arm up, series 15.    The catheter and sheath was removed and a silk figure-of-eight suture  was used in addition to manual pressure to obtain hemostasis.    LEFT UPPER EXTREMITY AND CENTRAL VENOGRAM:  Following lysis there is  mild to moderate improvement in overall thrombus burden within the  left approximately and left subclavian vein. Following mechanical  thrombectomy there is significant improvement in the overall thrombus  burden with near-complete resolution. Persistent severe stenosis at  the left medial subclavian vein with prominent collateral present.  This was  refractory to 10 mm balloon angioplasty. Final images  demonstrate severe stenosis at the left medial subclavian vein which  occludes with arm abduction.      Impression    IMPRESSION:    1.  Successful thrombolysis and mechanical thrombectomy. Near complete  removal of acute and chronic thrombus. Thrombolysis was terminated.  All sheaths and catheters were removed.   2.  Severe stenosis of the medial subclavian vein which completely  occludes with arm abduction. This is consistent with thoracic outlet  syndrome.  3.  Patient will continue on therapeutic heparin throughout the night.  Possible surgical decompression in the near future.    RHONDA HERNÁNDEZ MD         SYSTEM ID:  [id number]

## 2021-10-07 ENCOUNTER — SURGERY (OUTPATIENT)
Age: 41
End: 2021-10-07
Payer: COMMERCIAL

## 2021-10-07 ENCOUNTER — APPOINTMENT (OUTPATIENT)
Dept: GENERAL RADIOLOGY | Facility: CLINIC | Age: 41
DRG: 041 | End: 2021-10-07
Attending: STUDENT IN AN ORGANIZED HEALTH CARE EDUCATION/TRAINING PROGRAM
Payer: COMMERCIAL

## 2021-10-07 ENCOUNTER — ANESTHESIA (OUTPATIENT)
Dept: SURGERY | Facility: CLINIC | Age: 41
DRG: 041 | End: 2021-10-07
Payer: COMMERCIAL

## 2021-10-07 ENCOUNTER — ANESTHESIA EVENT (OUTPATIENT)
Dept: SURGERY | Facility: CLINIC | Age: 41
DRG: 041 | End: 2021-10-07
Payer: COMMERCIAL

## 2021-10-07 LAB
BASOPHILS # BLD AUTO: 0 10E3/UL (ref 0–0.2)
BASOPHILS NFR BLD AUTO: 0 %
CREAT SERPL-MCNC: 0.95 MG/DL (ref 0.66–1.25)
EOSINOPHIL # BLD AUTO: 0.1 10E3/UL (ref 0–0.7)
EOSINOPHIL NFR BLD AUTO: 2 %
ERYTHROCYTE [DISTWIDTH] IN BLOOD BY AUTOMATED COUNT: 12.6 % (ref 10–15)
GFR SERPL CREATININE-BSD FRML MDRD: >90 ML/MIN/1.73M2
HCT VFR BLD AUTO: 35.9 % (ref 40–53)
HGB BLD-MCNC: 12.3 G/DL (ref 13.3–17.7)
IMM GRANULOCYTES # BLD: 0 10E3/UL
IMM GRANULOCYTES NFR BLD: 0 %
LYMPHOCYTES # BLD AUTO: 1.4 10E3/UL (ref 0.8–5.3)
LYMPHOCYTES NFR BLD AUTO: 28 %
MCH RBC QN AUTO: 31.3 PG (ref 26.5–33)
MCHC RBC AUTO-ENTMCNC: 34.3 G/DL (ref 31.5–36.5)
MCV RBC AUTO: 91 FL (ref 78–100)
MONOCYTES # BLD AUTO: 0.4 10E3/UL (ref 0–1.3)
MONOCYTES NFR BLD AUTO: 9 %
NEUTROPHILS # BLD AUTO: 3.1 10E3/UL (ref 1.6–8.3)
NEUTROPHILS NFR BLD AUTO: 61 %
NRBC # BLD AUTO: 0 10E3/UL
NRBC BLD AUTO-RTO: 0 /100
PLATELET # BLD AUTO: 148 10E3/UL (ref 150–450)
RBC # BLD AUTO: 3.93 10E6/UL (ref 4.4–5.9)
UFH PPP CHRO-ACNC: 0.56 IU/ML
WBC # BLD AUTO: 5.1 10E3/UL (ref 4–11)

## 2021-10-07 PROCEDURE — 258N000003 HC RX IP 258 OP 636: Performed by: STUDENT IN AN ORGANIZED HEALTH CARE EDUCATION/TRAINING PROGRAM

## 2021-10-07 PROCEDURE — 250N000009 HC RX 250: Performed by: SURGERY

## 2021-10-07 PROCEDURE — 120N000001 HC R&B MED SURG/OB

## 2021-10-07 PROCEDURE — 250N000013 HC RX MED GY IP 250 OP 250 PS 637: Performed by: STUDENT IN AN ORGANIZED HEALTH CARE EDUCATION/TRAINING PROGRAM

## 2021-10-07 PROCEDURE — 85520 HEPARIN ASSAY: CPT | Performed by: ANESTHESIOLOGY

## 2021-10-07 PROCEDURE — 999N000141 HC STATISTIC PRE-PROCEDURE NURSING ASSESSMENT: Performed by: SURGERY

## 2021-10-07 PROCEDURE — 21615 EXCISION 1ST &/CERVICAL RIB: CPT | Mod: AS | Performed by: PHYSICIAN ASSISTANT

## 2021-10-07 PROCEDURE — 88304 TISSUE EXAM BY PATHOLOGIST: CPT | Mod: TC | Performed by: SURGERY

## 2021-10-07 PROCEDURE — 272N000001 HC OR GENERAL SUPPLY STERILE: Performed by: SURGERY

## 2021-10-07 PROCEDURE — 250N000011 HC RX IP 250 OP 636: Performed by: NURSE ANESTHETIST, CERTIFIED REGISTERED

## 2021-10-07 PROCEDURE — 250N000011 HC RX IP 250 OP 636: Performed by: ANESTHESIOLOGY

## 2021-10-07 PROCEDURE — 250N000009 HC RX 250: Performed by: NURSE ANESTHETIST, CERTIFIED REGISTERED

## 2021-10-07 PROCEDURE — 82565 ASSAY OF CREATININE: CPT | Performed by: STUDENT IN AN ORGANIZED HEALTH CARE EDUCATION/TRAINING PROGRAM

## 2021-10-07 PROCEDURE — 999N000063 XR CHEST PORT 1 VIEW

## 2021-10-07 PROCEDURE — 21615 EXCISION 1ST &/CERVICAL RIB: CPT | Mod: 22 | Performed by: SURGERY

## 2021-10-07 PROCEDURE — 99232 SBSQ HOSP IP/OBS MODERATE 35: CPT | Performed by: HOSPITALIST

## 2021-10-07 PROCEDURE — 99233 SBSQ HOSP IP/OBS HIGH 50: CPT | Performed by: INTERNAL MEDICINE

## 2021-10-07 PROCEDURE — 85025 COMPLETE CBC W/AUTO DIFF WBC: CPT | Performed by: STUDENT IN AN ORGANIZED HEALTH CARE EDUCATION/TRAINING PROGRAM

## 2021-10-07 PROCEDURE — 05N60ZZ RELEASE LEFT SUBCLAVIAN VEIN, OPEN APPROACH: ICD-10-PCS | Performed by: SURGERY

## 2021-10-07 PROCEDURE — 710N000009 HC RECOVERY PHASE 1, LEVEL 1, PER MIN: Performed by: SURGERY

## 2021-10-07 PROCEDURE — 258N000003 HC RX IP 258 OP 636: Performed by: ANESTHESIOLOGY

## 2021-10-07 PROCEDURE — 36415 COLL VENOUS BLD VENIPUNCTURE: CPT | Performed by: ANESTHESIOLOGY

## 2021-10-07 PROCEDURE — 3E03317 INTRODUCTION OF OTHER THROMBOLYTIC INTO PERIPHERAL VEIN, PERCUTANEOUS APPROACH: ICD-10-PCS | Performed by: RADIOLOGY

## 2021-10-07 PROCEDURE — 01N30ZZ RELEASE BRACHIAL PLEXUS, OPEN APPROACH: ICD-10-PCS | Performed by: SURGERY

## 2021-10-07 PROCEDURE — 88311 DECALCIFY TISSUE: CPT | Mod: TC | Performed by: SURGERY

## 2021-10-07 PROCEDURE — 250N000011 HC RX IP 250 OP 636: Performed by: STUDENT IN AN ORGANIZED HEALTH CARE EDUCATION/TRAINING PROGRAM

## 2021-10-07 PROCEDURE — 370N000017 HC ANESTHESIA TECHNICAL FEE, PER MIN: Performed by: SURGERY

## 2021-10-07 PROCEDURE — 250N000025 HC SEVOFLURANE, PER MIN: Performed by: SURGERY

## 2021-10-07 PROCEDURE — 250N000013 HC RX MED GY IP 250 OP 250 PS 637: Performed by: INTERNAL MEDICINE

## 2021-10-07 PROCEDURE — 360N000077 HC SURGERY LEVEL 4, PER MIN: Performed by: SURGERY

## 2021-10-07 RX ORDER — NEOSTIGMINE METHYLSULFATE 1 MG/ML
VIAL (ML) INJECTION PRN
Status: DISCONTINUED | OUTPATIENT
Start: 2021-10-07 | End: 2021-10-07

## 2021-10-07 RX ORDER — ONDANSETRON 4 MG/1
4 TABLET, ORALLY DISINTEGRATING ORAL EVERY 30 MIN PRN
Status: DISCONTINUED | OUTPATIENT
Start: 2021-10-07 | End: 2021-10-07 | Stop reason: HOSPADM

## 2021-10-07 RX ORDER — HYDROMORPHONE HCL IN WATER/PF 6 MG/30 ML
0.4 PATIENT CONTROLLED ANALGESIA SYRINGE INTRAVENOUS EVERY 5 MIN PRN
Status: DISCONTINUED | OUTPATIENT
Start: 2021-10-07 | End: 2021-10-07 | Stop reason: HOSPADM

## 2021-10-07 RX ORDER — DIMENHYDRINATE 50 MG/ML
25 INJECTION, SOLUTION INTRAMUSCULAR; INTRAVENOUS
Status: DISCONTINUED | OUTPATIENT
Start: 2021-10-07 | End: 2021-10-07 | Stop reason: HOSPADM

## 2021-10-07 RX ORDER — ACETAMINOPHEN 325 MG/1
650 TABLET ORAL EVERY 6 HOURS
Status: DISCONTINUED | OUTPATIENT
Start: 2021-10-07 | End: 2021-10-08 | Stop reason: HOSPADM

## 2021-10-07 RX ORDER — ONDANSETRON 2 MG/ML
4 INJECTION INTRAMUSCULAR; INTRAVENOUS EVERY 30 MIN PRN
Status: DISCONTINUED | OUTPATIENT
Start: 2021-10-07 | End: 2021-10-07 | Stop reason: HOSPADM

## 2021-10-07 RX ORDER — SODIUM CHLORIDE 9 MG/ML
INJECTION, SOLUTION INTRAVENOUS CONTINUOUS
Status: DISCONTINUED | OUTPATIENT
Start: 2021-10-07 | End: 2021-10-08 | Stop reason: HOSPADM

## 2021-10-07 RX ORDER — HEPARIN SODIUM 10000 [USP'U]/100ML
0-5000 INJECTION, SOLUTION INTRAVENOUS CONTINUOUS
Status: ACTIVE | OUTPATIENT
Start: 2021-10-08 | End: 2021-10-08

## 2021-10-07 RX ORDER — SODIUM CHLORIDE, SODIUM LACTATE, POTASSIUM CHLORIDE, CALCIUM CHLORIDE 600; 310; 30; 20 MG/100ML; MG/100ML; MG/100ML; MG/100ML
INJECTION, SOLUTION INTRAVENOUS CONTINUOUS
Status: DISCONTINUED | OUTPATIENT
Start: 2021-10-07 | End: 2021-10-07 | Stop reason: HOSPADM

## 2021-10-07 RX ORDER — NALOXONE HYDROCHLORIDE 0.4 MG/ML
0.4 INJECTION, SOLUTION INTRAMUSCULAR; INTRAVENOUS; SUBCUTANEOUS
Status: DISCONTINUED | OUTPATIENT
Start: 2021-10-07 | End: 2021-10-08 | Stop reason: HOSPADM

## 2021-10-07 RX ORDER — KETOROLAC TROMETHAMINE 30 MG/ML
INJECTION, SOLUTION INTRAMUSCULAR; INTRAVENOUS PRN
Status: DISCONTINUED | OUTPATIENT
Start: 2021-10-07 | End: 2021-10-07

## 2021-10-07 RX ORDER — HYDRALAZINE HYDROCHLORIDE 20 MG/ML
2.5-5 INJECTION INTRAMUSCULAR; INTRAVENOUS EVERY 10 MIN PRN
Status: DISCONTINUED | OUTPATIENT
Start: 2021-10-07 | End: 2021-10-07 | Stop reason: HOSPADM

## 2021-10-07 RX ORDER — BUPIVACAINE HYDROCHLORIDE 5 MG/ML
INJECTION, SOLUTION PERINEURAL PRN
Status: DISCONTINUED | OUTPATIENT
Start: 2021-10-07 | End: 2021-10-07 | Stop reason: HOSPADM

## 2021-10-07 RX ORDER — EPHEDRINE SULFATE 50 MG/ML
INJECTION, SOLUTION INTRAMUSCULAR; INTRAVENOUS; SUBCUTANEOUS PRN
Status: DISCONTINUED | OUTPATIENT
Start: 2021-10-07 | End: 2021-10-07

## 2021-10-07 RX ORDER — LIDOCAINE HYDROCHLORIDE 20 MG/ML
INJECTION, SOLUTION INFILTRATION; PERINEURAL PRN
Status: DISCONTINUED | OUTPATIENT
Start: 2021-10-07 | End: 2021-10-07

## 2021-10-07 RX ORDER — LIDOCAINE 40 MG/G
CREAM TOPICAL
Status: DISCONTINUED | OUTPATIENT
Start: 2021-10-07 | End: 2021-10-08 | Stop reason: HOSPADM

## 2021-10-07 RX ORDER — OXYCODONE HYDROCHLORIDE 5 MG/1
5 TABLET ORAL EVERY 4 HOURS PRN
Status: DISCONTINUED | OUTPATIENT
Start: 2021-10-07 | End: 2021-10-08 | Stop reason: HOSPADM

## 2021-10-07 RX ORDER — HYDROMORPHONE HCL IN WATER/PF 6 MG/30 ML
.2-.3 PATIENT CONTROLLED ANALGESIA SYRINGE INTRAVENOUS
Status: DISCONTINUED | OUTPATIENT
Start: 2021-10-07 | End: 2021-10-08 | Stop reason: HOSPADM

## 2021-10-07 RX ORDER — FENTANYL CITRATE 50 UG/ML
INJECTION, SOLUTION INTRAMUSCULAR; INTRAVENOUS PRN
Status: DISCONTINUED | OUTPATIENT
Start: 2021-10-07 | End: 2021-10-07

## 2021-10-07 RX ORDER — OXYCODONE HYDROCHLORIDE 5 MG/1
10 TABLET ORAL EVERY 4 HOURS PRN
Status: DISCONTINUED | OUTPATIENT
Start: 2021-10-07 | End: 2021-10-08 | Stop reason: HOSPADM

## 2021-10-07 RX ORDER — NALOXONE HYDROCHLORIDE 0.4 MG/ML
0.2 INJECTION, SOLUTION INTRAMUSCULAR; INTRAVENOUS; SUBCUTANEOUS
Status: DISCONTINUED | OUTPATIENT
Start: 2021-10-07 | End: 2021-10-08 | Stop reason: HOSPADM

## 2021-10-07 RX ORDER — ONDANSETRON 2 MG/ML
INJECTION INTRAMUSCULAR; INTRAVENOUS PRN
Status: DISCONTINUED | OUTPATIENT
Start: 2021-10-07 | End: 2021-10-07

## 2021-10-07 RX ORDER — PROPOFOL 10 MG/ML
INJECTION, EMULSION INTRAVENOUS PRN
Status: DISCONTINUED | OUTPATIENT
Start: 2021-10-07 | End: 2021-10-07

## 2021-10-07 RX ORDER — MAGNESIUM HYDROXIDE 1200 MG/15ML
LIQUID ORAL PRN
Status: DISCONTINUED | OUTPATIENT
Start: 2021-10-07 | End: 2021-10-07 | Stop reason: HOSPADM

## 2021-10-07 RX ORDER — PROPOFOL 10 MG/ML
INJECTION, EMULSION INTRAVENOUS CONTINUOUS PRN
Status: DISCONTINUED | OUTPATIENT
Start: 2021-10-07 | End: 2021-10-07

## 2021-10-07 RX ORDER — GLYCOPYRROLATE 0.2 MG/ML
INJECTION, SOLUTION INTRAMUSCULAR; INTRAVENOUS PRN
Status: DISCONTINUED | OUTPATIENT
Start: 2021-10-07 | End: 2021-10-07

## 2021-10-07 RX ORDER — DEXAMETHASONE SODIUM PHOSPHATE 4 MG/ML
INJECTION, SOLUTION INTRA-ARTICULAR; INTRALESIONAL; INTRAMUSCULAR; INTRAVENOUS; SOFT TISSUE PRN
Status: DISCONTINUED | OUTPATIENT
Start: 2021-10-07 | End: 2021-10-07

## 2021-10-07 RX ORDER — FENTANYL CITRATE 50 UG/ML
50 INJECTION, SOLUTION INTRAMUSCULAR; INTRAVENOUS EVERY 5 MIN PRN
Status: DISCONTINUED | OUTPATIENT
Start: 2021-10-07 | End: 2021-10-07 | Stop reason: HOSPADM

## 2021-10-07 RX ORDER — IBUPROFEN 600 MG/1
600 TABLET, FILM COATED ORAL EVERY 6 HOURS
Status: DISCONTINUED | OUTPATIENT
Start: 2021-10-07 | End: 2021-10-08 | Stop reason: HOSPADM

## 2021-10-07 RX ORDER — LABETALOL HYDROCHLORIDE 5 MG/ML
10 INJECTION, SOLUTION INTRAVENOUS
Status: DISCONTINUED | OUTPATIENT
Start: 2021-10-07 | End: 2021-10-07 | Stop reason: HOSPADM

## 2021-10-07 RX ADMIN — HYDROMORPHONE HYDROCHLORIDE 0.2 MG: 0.2 INJECTION, SOLUTION INTRAMUSCULAR; INTRAVENOUS; SUBCUTANEOUS at 20:55

## 2021-10-07 RX ADMIN — Medication 5 MG: at 19:20

## 2021-10-07 RX ADMIN — Medication 5 MG: at 18:03

## 2021-10-07 RX ADMIN — PROPOFOL 200 MG: 10 INJECTION, EMULSION INTRAVENOUS at 17:16

## 2021-10-07 RX ADMIN — ROCURONIUM BROMIDE 10 MG: 50 INJECTION, SOLUTION INTRAVENOUS at 18:15

## 2021-10-07 RX ADMIN — LIDOCAINE HYDROCHLORIDE 80 MG: 20 INJECTION, SOLUTION INFILTRATION; PERINEURAL at 17:16

## 2021-10-07 RX ADMIN — ONDANSETRON 4 MG: 2 INJECTION INTRAMUSCULAR; INTRAVENOUS at 17:26

## 2021-10-07 RX ADMIN — FENTANYL CITRATE 50 MCG: 50 INJECTION, SOLUTION INTRAMUSCULAR; INTRAVENOUS at 18:53

## 2021-10-07 RX ADMIN — BUPIVACAINE HYDROCHLORIDE 30 ML: 5 INJECTION, SOLUTION PERINEURAL at 20:04

## 2021-10-07 RX ADMIN — FENTANYL CITRATE 50 MCG: 50 INJECTION, SOLUTION INTRAMUSCULAR; INTRAVENOUS at 17:42

## 2021-10-07 RX ADMIN — ROSUVASTATIN CALCIUM 40 MG: 20 TABLET, FILM COATED ORAL at 07:18

## 2021-10-07 RX ADMIN — IBUPROFEN 600 MG: 600 TABLET ORAL at 22:30

## 2021-10-07 RX ADMIN — Medication 5 MG: at 18:47

## 2021-10-07 RX ADMIN — Medication 12.5 MG: at 18:30

## 2021-10-07 RX ADMIN — ROCURONIUM BROMIDE 20 MG: 50 INJECTION, SOLUTION INTRAVENOUS at 17:38

## 2021-10-07 RX ADMIN — DOCUSATE SODIUM 100 MG: 100 CAPSULE, LIQUID FILLED ORAL at 07:18

## 2021-10-07 RX ADMIN — SODIUM CHLORIDE 1000 ML: 900 IRRIGANT IRRIGATION at 17:53

## 2021-10-07 RX ADMIN — SODIUM CHLORIDE: 9 INJECTION, SOLUTION INTRAVENOUS at 07:20

## 2021-10-07 RX ADMIN — PROPOFOL 20 MCG/KG/MIN: 10 INJECTION, EMULSION INTRAVENOUS at 17:29

## 2021-10-07 RX ADMIN — SERTRALINE HYDROCHLORIDE 100 MG: 100 TABLET ORAL at 07:18

## 2021-10-07 RX ADMIN — KETOROLAC TROMETHAMINE 30 MG: 30 INJECTION, SOLUTION INTRAMUSCULAR at 19:43

## 2021-10-07 RX ADMIN — CEFAZOLIN SODIUM 2 G: 2 INJECTION, SOLUTION INTRAVENOUS at 17:10

## 2021-10-07 RX ADMIN — DEXAMETHASONE SODIUM PHOSPHATE 4 MG: 4 INJECTION, SOLUTION INTRA-ARTICULAR; INTRALESIONAL; INTRAMUSCULAR; INTRAVENOUS; SOFT TISSUE at 17:26

## 2021-10-07 RX ADMIN — NEOSTIGMINE METHYLSULFATE 5 MG: 1 INJECTION, SOLUTION INTRAVENOUS at 19:53

## 2021-10-07 RX ADMIN — GLYCOPYRROLATE 0.8 MG: 0.2 INJECTION, SOLUTION INTRAMUSCULAR; INTRAVENOUS at 19:53

## 2021-10-07 RX ADMIN — FENOFIBRATE 160 MG: 160 TABLET ORAL at 07:17

## 2021-10-07 RX ADMIN — ROCURONIUM BROMIDE 50 MG: 50 INJECTION, SOLUTION INTRAVENOUS at 17:16

## 2021-10-07 RX ADMIN — FENTANYL CITRATE 50 MCG: 50 INJECTION, SOLUTION INTRAMUSCULAR; INTRAVENOUS at 17:16

## 2021-10-07 RX ADMIN — HYDROMORPHONE HYDROCHLORIDE 0.5 MG: 1 INJECTION, SOLUTION INTRAMUSCULAR; INTRAVENOUS; SUBCUTANEOUS at 18:29

## 2021-10-07 RX ADMIN — Medication 5 MG: at 19:14

## 2021-10-07 RX ADMIN — MIDAZOLAM 2 MG: 1 INJECTION INTRAMUSCULAR; INTRAVENOUS at 17:10

## 2021-10-07 RX ADMIN — ACETAMINOPHEN 650 MG: 325 TABLET, FILM COATED ORAL at 22:29

## 2021-10-07 RX ADMIN — SODIUM CHLORIDE, POTASSIUM CHLORIDE, SODIUM LACTATE AND CALCIUM CHLORIDE: 600; 310; 30; 20 INJECTION, SOLUTION INTRAVENOUS at 18:38

## 2021-10-07 RX ADMIN — Medication 7.5 MG: at 17:48

## 2021-10-07 RX ADMIN — SODIUM CHLORIDE, POTASSIUM CHLORIDE, SODIUM LACTATE AND CALCIUM CHLORIDE: 600; 310; 30; 20 INJECTION, SOLUTION INTRAVENOUS at 17:10

## 2021-10-07 RX ADMIN — ROCURONIUM BROMIDE 10 MG: 50 INJECTION, SOLUTION INTRAVENOUS at 18:03

## 2021-10-07 RX ADMIN — Medication 5 MG: at 19:55

## 2021-10-07 ASSESSMENT — ACTIVITIES OF DAILY LIVING (ADL)
ADLS_ACUITY_SCORE: 9
ADLS_ACUITY_SCORE: 9
CONCENTRATING,_REMEMBERING_OR_MAKING_DECISIONS_DIFFICULTY: NO
WALKING_OR_CLIMBING_STAIRS_DIFFICULTY: NO
WEAR_GLASSES_OR_BLIND: NO
HEARING_DIFFICULTY_OR_DEAF: NO
ADLS_ACUITY_SCORE: 9
DIFFICULTY_EATING/SWALLOWING: NO
ADLS_ACUITY_SCORE: 9
DIFFICULTY_COMMUNICATING: NO
DRESSING/BATHING_DIFFICULTY: NO
FALL_HISTORY_WITHIN_LAST_SIX_MONTHS: NO
TOILETING_ISSUES: NO
ADLS_ACUITY_SCORE: 9
DOING_ERRANDS_INDEPENDENTLY_DIFFICULTY: NO

## 2021-10-07 ASSESSMENT — LIFESTYLE VARIABLES: TOBACCO_USE: 0

## 2021-10-07 ASSESSMENT — MIFFLIN-ST. JEOR: SCORE: 2067.63

## 2021-10-07 NOTE — PROGRESS NOTES
[facility]    Medicine Progress Note - Hospitalist Service       Date of Admission:  10/5/2021    Assessment & Plan         Pool Ontiveros is a 41 year old male who is essentially healthy other than having depression hyperlipidemia.  He recently resumed a weight training regimen and noticed that his left arm felt a little tight.  In the emergency room he was found to have a left upper extremity DVT with thrombosis in the subclavian and axillary veins.  He was started on intravenous heparin, is being evaluated by interventional radiology and vascular surgery and is being admitted to the hospital.     Left upper extremity(subclavian and axillary) deep venous thrombosis likely due to thoracic outlet syndrome   S/P IR thrombolysis 10/5 and IR mechanical thrombectomy 10/6  The patient was started on IV heparin in the emergency room.  The interventional radiologist is going to perform a thrombectomy.  The vascular surgeon has seen him and he will likely have decompressive surgery.    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 Vascular medicine consulted and appreciated. Elevated triglycerides over 500 with direct . Concur with vascular medicine-myositis risk is elevated particularly with gemfibrozil and statins. Gemfibrozil should be discontinued (was not started in hospital).  - Vascular medicine has started fenofibrate and rosuvastatin  - Vascular medicine follow-up as outpatient with advanced lipid testing in 3 months and subsequent office visit with Dr. Street     Depression     Continue sertraline      COVID 19 screening  Low suspicion. PCR negative 10/5      Diet: Regular Diet Adult    DVT  Prophylaxis: Heparin gtt / defer to surgery  Street Catheter: Not present  Central Lines: None  Code Status: Full Code      Disposition Plan   Expected discharge: 10/11/2021   recommended to home pending post op course, specialist recs     The patient's care was discussed with the Bedside Nurse, Patient and Patient's Family.    Blake Aguirre MD  Hospitalist Service  [facility]  Securely message with the [employer] Web Console (learn more here)  Text page via [employer] Paging/Directory      Clinically Significant Risk Factors Present on Admission               ______________________________________________________________________    Interval History   Patient seen and examined this afternoon. Patient's wife present at bedside. Left arm wrapped with some decreased distal edema noted. Sensation and motor function remained intact. Denies shortness of breath or pain. OR planned for this afternoon.    Data reviewed today: I reviewed all medications, new labs and imaging results over the last 24 hours. I personally reviewed no images or EKG's today.    Physical Exam   Vital Signs: Temp: 98  F (36.7  C) Temp src: Oral BP: (!) 164/97 Pulse: 56   Resp: 15 SpO2: 100 % O2 Device: None (Room air)    Weight: 236 lbs 5.33 oz    Gen: NAD, pleasant  HEENT: Normocephalic, EOMI, MMM  Resp: no crackles,  no wheezes, no increased work of resp  CV: S1S2 heard, reg rhythm, reg rate, no pedal edema  Abdo: soft, nontender, nondistended, bowel sounds present  Ext: calves nontender, LUE ace wrapped, grossly NVI  Neuro: AAOx3, CN grossly intact, no facial asymmetry      Data   Recent Labs   Lab 10/07/21  0501 10/06/21  1535 10/06/21  0718 10/06/21  0505 10/05/21  2042 10/05/21  1237 10/05/21  1237   WBC 5.1 5.3  --  6.2  --    < > 7.2   HGB 12.3* 12.6*  --  13.8  --    < > 14.2   MCV 91 90  --  92  --    < > 91   * 142*  --  171  --    < > 181   INR  --   --   --   --   --   --  1.03   NA  --   --   --  139  --   --   140   POTASSIUM  --   --   --  4.0  --   --  4.1   CHLORIDE  --   --   --  109  --   --  106   CO2  --   --   --  25  --   --  24   BUN  --   --   --  18  --   --  13   CR 0.95  --   --  1.06  --   --  1.06   ANIONGAP  --   --   --  5  --   --  10   NIYA  --   --   --  7.4*  --   --  9.0   GLC  --   --  99 113* 92  --  91   ALBUMIN  --   --   --   --   --   --  4.0   PROTTOTAL  --   --   --   --   --   --  7.7   BILITOTAL  --   --   --   --   --   --  0.5   ALKPHOS  --   --   --   --   --   --  67   ALT  --   --   --   --   --   --  31   AST  --   --   --   --   --   --  26   TROPONIN  --   --   --   --   --   --  <0.015    < > = values in this interval not displayed.     No results found for this or any previous visit (from the past 24 hour(s)).

## 2021-10-07 NOTE — PLAN OF CARE
Neuro: A&Ox4.   CV: SR/SB BP stable  Resp: on RA.  GI/: Voiding in urinal  Pain/Gtts: denies pain. LUE remains at 39.5cm.  Heparin gtt infusing.   POC: NPO after midnight.

## 2021-10-07 NOTE — ANESTHESIA PREPROCEDURE EVALUATION
Anesthesia Pre-Procedure Evaluation    Patient: Pool Ontiveros   MRN: [id number] : 1980        Preoperative Diagnosis: Acute deep vein thrombosis (DVT) of other vein of upper extremity, unspecified laterality (H) [I82.629]    Procedure : Procedure(s):  LEFT TRANSAXILLARY FIRST RIB RESECTION AND SCALENECTOMY.          Past Medical History:   Diagnosis Date     Depression with anxiety 2016     Hernia, abdominal      History of atrial septal defect repair 3/5/2012    Overview:  repaired age 5      History of inguinal hernia repair 3/5/2012    Overview:  Age 6      Mixed hyperlipidemia 2016      Past Surgical History:   Procedure Laterality Date     HERNIA REPAIR       IR ANGIOGRAM THROUGH CATHETER FOLLOW UP  10/6/2021     IR UPPER EXTREMITY VENOGRAM LEFT  10/5/2021      Allergies   Allergen Reactions     Vicodin [Hydrocodone-Acetaminophen] Hives      Social History     Tobacco Use     Smoking status: Never Smoker     Smokeless tobacco: Former User     Types: Chew     Tobacco comment: Gum, ocasionally    Substance Use Topics     Alcohol use: Yes     Comment: 10-15 drinks per week      Wt Readings from Last 1 Encounters:   10/07/21 107.2 kg (236 lb 5.3 oz)        Anesthesia Evaluation   Pt has had prior anesthetic. Type: General.    No history of anesthetic complications       ROS/MED HX  ENT/Pulmonary:    (-) tobacco use   Neurologic:       Cardiovascular:     (+) Dyslipidemia -----congenital heart disease Hx of ASD s/p repair. Previous cardiac testing   Echo: Date: Results:    Stress Test: Date: Results:    ECG Reviewed: Date: 10/5/21 Results:  NSR  Cath: Date: Results:      METS/Exercise Tolerance:     Hematologic:     (+) History of blood clots (DVT),     Musculoskeletal:       GI/Hepatic:       Renal/Genitourinary:       Endo:       Psychiatric/Substance Use:     (+) psychiatric history anxiety and depression     Infectious Disease:       Malignancy:       Other:               OUTSIDE LABS:  CBC:    Lab Results   Component Value Date    WBC 5.1 10/07/2021    WBC 5.3 10/06/2021    HGB 12.3 (L) 10/07/2021    HGB 12.6 (L) 10/06/2021    HCT 35.9 (L) 10/07/2021    HCT 36.5 (L) 10/06/2021     (L) 10/07/2021     (L) 10/06/2021     BMP:   Lab Results   Component Value Date     10/06/2021     10/05/2021    POTASSIUM 4.0 10/06/2021    POTASSIUM 4.1 10/05/2021    CHLORIDE 109 10/06/2021    CHLORIDE 106 10/05/2021    CO2 25 10/06/2021    CO2 24 10/05/2021    BUN 18 10/06/2021    BUN 13 10/05/2021    CR 0.95 10/07/2021    CR 1.06 10/06/2021    GLC 99 10/06/2021     (H) 10/06/2021     COAGS:   Lab Results   Component Value Date    INR 1.03 10/05/2021     POC: No results found for: BGM, HCG, HCGS  HEPATIC:   Lab Results   Component Value Date    ALBUMIN 4.0 10/05/2021    PROTTOTAL 7.7 10/05/2021    ALT 31 10/05/2021    AST 26 10/05/2021    ALKPHOS 67 10/05/2021    BILITOTAL 0.5 10/05/2021     OTHER:   Lab Results   Component Value Date    LACT 1.5 10/05/2021    NIYA 7.4 (L) 10/06/2021    TSH 1.35 02/27/2018       Anesthesia Plan    ASA Status:  2      Anesthesia Type: General.     - Airway: ETT   Induction: Intravenous.   Maintenance: Balanced.        Consents    Anesthesia Plan(s) and associated risks, benefits, and realistic alternatives discussed. Questions answered and patient/representative(s) expressed understanding.     - Discussed with:  Patient         Postoperative Care    Pain management: IV analgesics, Multi-modal analgesia.   PONV prophylaxis: Ondansetron (or other 5HT-3), Dexamethasone or Solumedrol     Comments:                Gary Tovar MD

## 2021-10-07 NOTE — CONSULTS
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    IMPRESSION:    1.  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      The patient was admitted.  He was found to have subclavian and axillary vein DVT.  He is a weight .  It was highly suspicious that the patient had thoracic outlet syndrome as the etiology of the above.  The patient was taken to the Interventional Radiology suite.  A catheter-directed thrombolysis was undertaken.  This successfully achieved, along with mechanical thrombectomy, near complete removal of acute and chronic thrombus.  Thrombolysis was therefore terminated.  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  Vascular Surgical consultation was undertaken.  The patient is planned to go to the operating room tomorrow for first rib resection and anterior scalenectomy.  Our input was sought regarding the above.  At present, my recommendations are as follows:  Continue on IV unfractionated heparin.  Postoperatively, maintain the patient on IV unfractionated heparin until postop day #1.  If adequate hemostasis has been achieved, transition to oral anticoagulation in the form of Xarelto 15 mg p.o. b.i.d. x 21 days, then 20 mg daily thereafter.  Likely duration of anticoagulation will be 6 months.  The patient will be seen in followup at the [facility] in 7-10 days post hospitalization by Pooja Pelaez PA-C.  At that time, Ms. Pelaez will order a D-dimer and left upper extremity venous  duplex to be done in 3-6 months to determine if the patient could go off of anticoagulation at that time.  The patient will be seen in followup by myself to determine appropriate date of cessation of anticoagulation.      2.  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      The patient has been advised by his primary MD to go on gemfibrozil.  His triglycerides have been greater than 500.  His direct LDL was 125.  At present, I recommend the following:  A) I would actually recommend that the patient be on a statin as well as a fibric acid derivative.  Given the interaction between gemfibrozil and statins, the increasing risk of myositis, I will not prescribe gemfibrozil.  B) Initiate fenofibrate 160 mg daily.  C) Initiate Crestor 20 mg daily.  D) In 3 months check advanced lipid testing with followup with myself 2 weeks later.    CHIEF COMPLAINT:  Left arm pain.    HISTORY OF PRESENT ILLNESS:  Please see the above.  The patient is a nonsmoking, nondiabetic 41-year-old male with a history of hyperlipidemia.  He also has bipolar disorder, but that is well managed with sertraline.  For most of his adult life he has lifted weights.  He, however, has taken a hiatus from this for the 6 months previous to admission only to have reinstituted his weight training regimen recently.  Since doing so, he notes his left arm felt a little tight.  He presented to the Emergency Room for evaluation.  He was noted to have an occlusive left subclavian and axillary thrombosis as well as superficial left cephalic vein thrombosis.  He was started on IV heparin and taken to the Interventional Radiology suite as above.  Our input was sought regarding the above.    REVIEW OF SYSTEMS:  The patient currently denies chest pain, shortness of breath, nausea, vomiting, diarrhea.  He does complain of mild left upper extremity discomfort.  Remainder of his 14-point review of systems is within normal limits.    PREVIOUS MEDICAL HISTORY:  1.  Mixed  hyperlipidemia.  2.  Depression/anxiety.  3.  Abdominal hernia.  4.  ASD, status post repair.    PREVIOUS SURGICAL HISTORY:    1.  Inguinal hernia repair at age 6.  2.  Atrial septal defect repair at age 5.    SOCIAL HISTORY:  The patient is a lifetime nonsmoker.  He is employed as a banking industry .  He is .  He has 2 children, aged 8 and 10.    FAMILY HISTORY:  Notable for bipolar disease and depression in his mother.    MEDICATIONS PRIOR TO ADMISSION:  1.  Advil.  2.  Zoloft.    ALLERGIES:  VICODIN CAUSES HIVES.    PHYSICAL EXAMINATION CURRENTLY:    VITAL SIGNS:  Blood pressure is 135/81, pulse is 57, temperature is 98, respiratory rate is 18, pulse ox 99% on room air.  NECK:  No JVD, thyromegaly, or lymphadenopathy.  LUNGS:  Clear to auscultation bilaterally without rales, wheezes, or rhonchi.  HEART:  Regular rate and rhythm.  GASTROINTESTINAL:  Normoactive bowel sounds.  Soft, nondistended, nontender.  EXTREMITIES:  Without cyanosis or clubbing.  The left upper extremity is slightly larger than the right upper extremity.  Sensory motor is intact to the left upper extremity.  The patient has a 3+ radial pulse.  NEUROLOGIC:  Exam is nonfocal.  DERMATOLOGIC:  Exam reveals no suspicious lumps or masses.  HEMATOLOGIC:  Exam reveals no lymphadenopathy.      LABORATORY/DATA:  Reveals normal renal function.  Glucose 113, nonfasting.  CBC with platelets reveals hemoglobin 14.2, hematocrit 41.9, platelet count is 181.  Imaging results are as above.    ASSESSMENT AND PLAN:  Please see the above.      Tony Street MD        D: 10/06/2021   T: 10/06/2021   MT: [facility]    Name:     CATERINA WALLACE  MRN:      [id number]        Account:      [id number]   :      1980           Consult Date: 10/06/2021     Document: [id number]    cc:  MD Paco Calero MD William R. Omlie, MD

## 2021-10-07 NOTE — H&P (VIEW-ONLY)
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    IMPRESSION:    1.  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      The patient was admitted.  He was found to have subclavian and axillary vein DVT.  He is a weight .  It was highly suspicious that the patient had thoracic outlet syndrome as the etiology of the above.  The patient was taken to the Interventional Radiology suite.  A catheter-directed thrombolysis was undertaken.  This successfully achieved, along with mechanical thrombectomy, near complete removal of acute and chronic thrombus.  Thrombolysis was therefore terminated.  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  Vascular Surgical consultation was undertaken.  The patient is planned to go to the operating room tomorrow for first rib resection and anterior scalenectomy.  Our input was sought regarding the above.  At present, my recommendations are as follows:  Continue on IV unfractionated heparin.  Postoperatively, maintain the patient on IV unfractionated heparin until postop day #1.  If adequate hemostasis has been achieved, transition to oral anticoagulation in the form of Xarelto 15 mg p.o. b.i.d. x 21 days, then 20 mg daily thereafter.  Likely duration of anticoagulation will be 6 months.  The patient will be seen in followup at the [facility] in 7-10 days post hospitalization by Pooja Pelaez PA-C.  At that time, Ms. Pelaez will order a D-dimer and left upper extremity venous  duplex to be done in 3-6 months to determine if the patient could go off of anticoagulation at that time.  The patient will be seen in followup by myself to determine appropriate date of cessation of anticoagulation.      2.  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      The patient has been advised by his primary MD to go on gemfibrozil.  His triglycerides have been greater than 500.  His direct LDL was 125.  At present, I recommend the following:  A) I would actually recommend that the patient be on a statin as well as a fibric acid derivative.  Given the interaction between gemfibrozil and statins, the increasing risk of myositis, I will not prescribe gemfibrozil.  B) Initiate fenofibrate 160 mg daily.  C) Initiate Crestor 20 mg daily.  D) In 3 months check advanced lipid testing with followup with myself 2 weeks later.    CHIEF COMPLAINT:  Left arm pain.    HISTORY OF PRESENT ILLNESS:  Please see the above.  The patient is a nonsmoking, nondiabetic 41-year-old male with a history of hyperlipidemia.  He also has bipolar disorder, but that is well managed with sertraline.  For most of his adult life he has lifted weights.  He, however, has taken a hiatus from this for the 6 months previous to admission only to have reinstituted his weight training regimen recently.  Since doing so, he notes his left arm felt a little tight.  He presented to the Emergency Room for evaluation.  He was noted to have an occlusive left subclavian and axillary thrombosis as well as superficial left cephalic vein thrombosis.  He was started on IV heparin and taken to the Interventional Radiology suite as above.  Our input was sought regarding the above.    REVIEW OF SYSTEMS:  The patient currently denies chest pain, shortness of breath, nausea, vomiting, diarrhea.  He does complain of mild left upper extremity discomfort.  Remainder of his 14-point review of systems is within normal limits.    PREVIOUS MEDICAL HISTORY:  1.  Mixed  hyperlipidemia.  2.  Depression/anxiety.  3.  Abdominal hernia.  4.  ASD, status post repair.    PREVIOUS SURGICAL HISTORY:    1.  Inguinal hernia repair at age 6.  2.  Atrial septal defect repair at age 5.    SOCIAL HISTORY:  The patient is a lifetime nonsmoker.  He is employed as a banking industry .  He is .  He has 2 children, aged 8 and 10.    FAMILY HISTORY:  Notable for bipolar disease and depression in his mother.    MEDICATIONS PRIOR TO ADMISSION:  1.  Advil.  2.  Zoloft.    ALLERGIES:  VICODIN CAUSES HIVES.    PHYSICAL EXAMINATION CURRENTLY:    VITAL SIGNS:  Blood pressure is 135/81, pulse is 57, temperature is 98, respiratory rate is 18, pulse ox 99% on room air.  NECK:  No JVD, thyromegaly, or lymphadenopathy.  LUNGS:  Clear to auscultation bilaterally without rales, wheezes, or rhonchi.  HEART:  Regular rate and rhythm.  GASTROINTESTINAL:  Normoactive bowel sounds.  Soft, nondistended, nontender.  EXTREMITIES:  Without cyanosis or clubbing.  The left upper extremity is slightly larger than the right upper extremity.  Sensory motor is intact to the left upper extremity.  The patient has a 3+ radial pulse.  NEUROLOGIC:  Exam is nonfocal.  DERMATOLOGIC:  Exam reveals no suspicious lumps or masses.  HEMATOLOGIC:  Exam reveals no lymphadenopathy.      LABORATORY/DATA:  Reveals normal renal function.  Glucose 113, nonfasting.  CBC with platelets reveals hemoglobin 14.2, hematocrit 41.9, platelet count is 181.  Imaging results are as above.    ASSESSMENT AND PLAN:  Please see the above.      Tony Street MD        D: 10/06/2021   T: 10/06/2021   MT: [facility]    Name:     CATERINA WALLACE  MRN:      [id number]        Account:      [id number]   :      1980           Consult Date: 10/06/2021     Document: [id number]    cc:  MD Paco Calero MD William R. Omlie, MD

## 2021-10-07 NOTE — PROGRESS NOTES
[facility]  Vascular Medicine Progress Note          Assessment and Plan:         1.  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       -Catheter-directed thrombolysis was undertaken.  This successfully achieved, along with mechanical thrombectomy, near complete removal of acute and chronic thrombus.  Thrombolysis was therefore terminated.  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      -Vascular Surgical to go to the operating room today for first rib resection and anterior scalenectomy.       -Continue on IV unfractionated heparin.  Postoperatively, maintain the patient on IV unfractionated heparin until postop day #1.  If adequate hemostasis has been achieved, transition to oral anticoagulation in the form of Xarelto 15 mg p.o. b.i.d. x 21 days, then 20 mg daily thereafter.  Likely duration of anticoagulation will be 6 months.      -The patient will be seen in followup at the [facility] in 7-10 days post hospitalization by Pooja Pelaez PA-C.  At that time, Ms. Pelaez will order a D-dimer and left upper extremity venous duplex to be done in 3-6 months to determine if the patient could go off of anticoagulation at that time.  The patient will be seen in followup by myself to determine appropriate date of cessation of anticoagulation.        2.  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       The patient has been advised by his primary MD to go on gemfibrozil.  His triglycerides have been greater than 500.  His direct LDL was 125.  At present, I recommend the following:  A) I would actually recommend that the patient be on a statin as well as a fibric acid derivative.  Given the interaction between gemfibrozil and statins, the increasing risk of myositis, I will not prescribe gemfibrozil.  B) Initiate fenofibrate 160 mg daily.  C) Initiate Crestor 20 mg daily.  D)  In 3 months check advanced lipid testing with followup with myself 2 weeks later.             Interval History:   doing well; no cp, sob, n/v/d, or abd pain.              Review of Systems:   The 10 point Review of Systems is negative other than noted in the HPI             Medications:       ceFAZolin  2 g Intravenous Pre-Op/Pre-procedure x 1 dose     docusate sodium  100 mg Oral BID     fenofibrate  160 mg Oral Daily     rosuvastatin  40 mg Oral Daily     sertraline  100 mg Oral Daily     sodium chloride (PF)  3 mL Intracatheter Q8H                  Physical Exam:     Patient Vitals for the past 24 hrs:   BP Temp Temp src Pulse Resp SpO2 Weight   10/07/21 1000 (!) 143/94 -- -- 53 23 99 % --   10/07/21 0800 (!) 162/80 98  F (36.7  C) Oral 62 17 98 % --   10/07/21 0600 (!) 148/75 -- -- 57 11 -- --   10/07/21 0500 (!) 151/69 -- -- 60 12 -- --   10/07/21 0400 136/71 98.3  F (36.8  C) Oral 64 17 97 % 107.2 kg (236 lb 5.3 oz)   10/07/21 0300 (!) 145/76 -- -- 57 14 -- --   10/07/21 0200 124/60 -- -- 58 13 98 % --   10/07/21 0100 (!) 150/71 -- -- 56 14 -- --   10/07/21 0000 (!) 149/72 97.9  F (36.6  C) Oral 59 16 97 % --   10/06/21 2300 (!) 142/71 -- -- 59 13 -- --   10/06/21 2200 121/65 -- -- 62 13 -- --   10/06/21 2100 137/69 -- -- 65 14 -- --   10/06/21 2000 (!) 164/83 98.9  F (37.2  C) Oral 102 26 96 % --   10/06/21 1900 (!) 166/83 -- -- 71 13 -- --   10/06/21 1830 (!) 162/82 -- -- 64 15 -- --   10/06/21 1800 (!) 160/81 -- -- 65 14 99 % --   10/06/21 1730 (!) 157/87 -- -- 69 17 98 % --   10/06/21 1700 (!) 161/89 -- -- 57 19 99 % --   10/06/21 1630 (!) 155/83 -- -- 56 18 99 % --   10/06/21 1600 (!) 145/88 98  F (36.7  C) Oral 56 16 97 % --   10/06/21 1530 (!) 151/83 -- -- 66 17 97 % --   10/06/21 1450 -- -- -- 61 11 97 % --   10/06/21 1445 139/82 -- -- 60 14 99 % --   10/06/21 1440 135/81 -- -- 62 12 99 % --   10/06/21 1435 (!) 131/96 -- -- 63 13 98 % --   10/06/21 1430 (!) 137/90 -- -- 60 10 99 % --   10/06/21 1425  132/88 -- -- 64 13 93 % --   10/06/21 1420 138/89 -- -- 68 13 95 % --   10/06/21 1415 (!) 133/91 -- -- 70 15 93 % --   10/06/21 1410 135/86 -- -- 65 12 95 % --   10/06/21 1405 130/85 -- -- 68 11 93 % --   10/06/21 1400 (!) 133/94 -- -- 68 15 94 % --   10/06/21 1355 130/80 -- -- 67 9 95 % --   10/06/21 1350 129/80 -- -- 69 11 92 % --   10/06/21 1345 131/83 -- -- 64 10 95 % --   10/06/21 1340 129/86 -- -- 64 8 95 % --   10/06/21 1335 135/85 -- -- 64 17 99 % --   10/06/21 1330 129/82 -- -- 64 29 97 % --   10/06/21 1200 (!) 145/70 98  F (36.7  C) Oral 84 8 96 % --   10/06/21 1100 -- -- -- 60 14 97 % --     Wt Readings from Last 4 Encounters:   10/07/21 107.2 kg (236 lb 5.3 oz)   07/28/21 101.2 kg (223 lb)   05/03/19 101.2 kg (223 lb)   04/26/18 97.5 kg (215 lb)       Intake/Output Summary (Last 24 hours) at 10/7/2021 1014  Last data filed at 10/7/2021 1000  Gross per 24 hour   Intake 3297.5 ml   Output 3325 ml   Net -27.5 ml     Constitutional: normal  Eyes: normal  ENT: normal  Neck: Supple, symmetrical, trachea midline, no adenopathy, thyroid symmetric, not enlarged and no tenderness, skin normal.  Hematologic / Lymphatic: normal  Back: normal  Lungs: No increased work of breathing, good air exchange, clear to auscultation bilaterally, no crackles or wheezing.  Cardiovascular: Regular rate and rhythm, normal S1 and S2, no S3 or S4, and no murmur noted.  Chest / Breast: normal  Abdomen: normal  Musculoskeletal: LUE ACE wrapped  Neurologic: normal  Neuropsychiatric: normal  Skin: normal           Data:     Results for orders placed or performed during the hospital encounter of 10/05/21 (from the past 24 hour(s))   Activated clotting time celite, POCT   Result Value Ref Range    Activated Clotting Time (Celite) POCT 82 74 - 150 seconds   Activated clotting time celite, POCT   Result Value Ref Range    Activated Clotting Time (Celite) POCT 227 (H) 74 - 150 seconds   IR Angiogram through Catheter Follow Up    Narrative     DATE: 10/6/2021    PROCEDURE:  LEFT LOWER EXTREMITY VENOGRAM   MECHANICAL THROMBECTOMY OF LEFT SUBCLAVIAN VEIN  THROMBOLYSIS CHECK AND TERMINATION OF THROMBOLYSIS.  MODERATE SEDATION    INTERVENTIONAL RADIOLOGIST: Eamon Riley MD    INDICATION: 41-year-old man with acute presentation of left subclavian  vein thrombosis in the setting of thoracic outlet syndrome. Underwent  initiation of thrombolysis on 10/5/2021. Here for lysis check and  possible completion thrombectomy.    CONSENT: The risks, benefits and alternatives of planned procedure  were discussed with the patient  in detail. All questions were  answered. Informed consent was given to proceed with the procedure.    MODERATE SEDATION: Versed 5 mg IV; Fentanyl to 75 mcg IV. During the  time out, immediately prior to the administration of medications, the  patient was reassessed for adequacy to receive conscious sedation.   Under physician supervision, Versed and fentanyl were administered for  moderate sedation. Pulse oximetry, heart rate and blood pressure were  continuously monitored by an independent trained observer. The  physician spent 71 minutes of face-to-face sedation time with the  patient.    CONTRAST: 45 mL  ADDITIONAL MEDICATIONS: 98567 units Heparin IV    FLUOROSCOPIC TIME: 8.5 minutes.  RADIATION DOSE: Air Kerma: 162 mGy.    COMPLICATIONS: No immediate complications.    STERILE BARRIER TECHNIQUE: Maximum sterile barrier technique was used.  Cutaneous antisepsis was performed at the operative site with  application of 2% chlorhexidine and large sterile drape. Prior to the  procedure, the  and assistant performed hand hygiene and wore  hat, mask, sterile gown, and sterile gloves during the entire  procedure.    PROCEDURE: Left basilic vein sheath was inspected and appeared intact.  The existing 5 Dutch infusion catheter was injected and a left upper  extremity and central venograms were obtained. A 5 Dutch catheter and  Glidewire  were manipulated into the right external iliac vein. Repeat  diagnostic venograms were performed through a 5 [place]an catheter at the  level of the left subclavian vein.    Exchange Amplatz wire was advanced into the right external iliac vein.  6 [place] sheath was exchanged for the 13 [place] ClotTriever sheath  using sequential dilation. The ClotTriever catheter was inserted and 3  passes were made from the left brachiocephalic vein to the left  basilic vein. Following this, balloon angioplasty utilizing a 10 x 4  mm high pressure angioplasty balloon was inflated throughout the  subclavian vein with visible waist at the site of previously seen  severe stenosis. There is near immediate elastic recoil on post  angioplasty venogram. Repeat ClotTriever mechanical thrombectomy was  performed 2 times through the stenotic segment with only minimal  residual thrombus removed.    Completion venograms were performed with the left arm in neutral,  series 13, arm down, series 14, and arm up, series 15.    The catheter and sheath was removed and a silk figure-of-eight suture  was used in addition to manual pressure to obtain hemostasis.    LEFT UPPER EXTREMITY AND CENTRAL VENOGRAM:  Following lysis there is  mild to moderate improvement in overall thrombus burden within the  left approximately and left subclavian vein. Following mechanical  thrombectomy there is significant improvement in the overall thrombus  burden with near-complete resolution. Persistent severe stenosis at  the left medial subclavian vein with prominent collateral present.  This was refractory to 10 mm balloon angioplasty. Final images  demonstrate severe stenosis at the left medial subclavian vein which  occludes with arm abduction.      Impression    IMPRESSION:    1.  Successful thrombolysis and mechanical thrombectomy. Near complete  removal of acute and chronic thrombus. Thrombolysis was terminated.  All sheaths and catheters were removed.   2.  Severe  stenosis of the medial subclavian vein which completely  occludes with arm abduction. This is consistent with thoracic outlet  syndrome.  3.  Patient will continue on therapeutic heparin throughout the night.  Possible surgical decompression in the near future.    EAMON RILEY MD         SYSTEM ID:  [id number]   Lysis check     Narrative    Eamon Riley MD     10/6/2021  3:18 PM  [facility]    Procedure: Lysis check     Date/Time: 10/6/2021 2:55 PM  Performed by: Eamon Riley MD  Authorized by: Eamon Riley MD     UNIVERSAL PROTOCOL   Site Marked: NA  Prior Images Obtained and Reviewed:  Yes  Required items: Required blood products, implants, devices and special   equipment available    Patient identity confirmed:  Verbally with patient  Patient was reevaluated immediately before administering moderate or deep   sedation or anesthesia  Confirmation Checklist:  Patient's identity using two indicators, relevant   allergies and procedure was appropriate and matched the consent or   emergent situation  Time out: Immediately prior to the procedure a time out was called    Universal Protocol: the Joint Commission Universal Protocol was followed    Preparation: Patient was prepped and draped in usual sterile fashion    ESBL (mL):  10         ANESTHESIA    Anesthesia: Local infiltration  Local Anesthetic:  Lidocaine 1% without epinephrine  Anesthetic Total (mL):  8      SEDATION    Patient Sedated: Yes    Sedation Type:  Moderate (conscious) sedation  Sedation:  Fentanyl, midazolam and see MAR for details  Vital signs: Vital signs monitored during sedation    See dictated procedure note for full details.  PROCEDURE   Patient Tolerance:  Patient tolerated the procedure well with no immediate   complications  Describe Procedure:     Improved thrombus burden after lysis, significantly improved after   mechanical thrombectomy    Angioplasty to elastic stenosis at the medial  subclavian vein, only   minimal improvement.    Abduction and Adduction views obtained, occlusion of the subclavian vein   with arm up.    Sheath removed and heparin therapeutic infusion started.  Length of time physician/provider present for 1:1 monitoring during   sedation: 75   CBC with platelets   Result Value Ref Range    WBC Count 5.3 4.0 - 11.0 10e3/uL    RBC Count 4.08 (L) 4.40 - 5.90 10e6/uL    Hemoglobin 12.6 (L) 13.3 - 17.7 g/dL    Hematocrit 36.5 (L) 40.0 - 53.0 %    MCV 90 78 - 100 fL    MCH 30.9 26.5 - 33.0 pg    MCHC 34.5 31.5 - 36.5 g/dL    RDW 12.4 10.0 - 15.0 %    Platelet Count 142 (L) 150 - 450 10e3/uL   Heparin Unfractionated Anti Xa Level   Result Value Ref Range    Anti Xa Unfractionated Heparin 0.60 For Reference Range, See Comment IU/mL    Narrative    Therapeutic Range: UFH: 0.25-0.50 IU/mL for low intensity dosing,  0.30-0.70 IU/mL for high intensity dosing DVT and PE.  This test is not validated for other direct factor X inhibitors (e.g. rivaroxaban, apixaban, edoxaban, betrixaban, fondaparinux) and should not be used for monitoring of other medications.   CBC with platelets differential    Narrative    The following orders were created for panel order CBC with platelets differential.  Procedure                               Abnormality         Status                     ---------                               -----------         ------                     CBC with platelets and d...[[id number]]  Abnormal            Final result                 Please view results for these tests on the individual orders.   Creatinine   Result Value Ref Range    Creatinine 0.95 0.66 - 1.25 mg/dL    GFR Estimate >90 >60 mL/min/1.73m2   Heparin Unfractionated Anti Xa Level   Result Value Ref Range    Anti Xa Unfractionated Heparin 0.56 For Reference Range, See Comment IU/mL    Narrative    Therapeutic Range: UFH: 0.25-0.50 IU/mL for low intensity dosing,  0.30-0.70 IU/mL for high intensity dosing DVT and  PE.  This test is not validated for other direct factor X inhibitors (e.g. rivaroxaban, apixaban, edoxaban, betrixaban, fondaparinux) and should not be used for monitoring of other medications.   CBC with platelets and differential   Result Value Ref Range    WBC Count 5.1 4.0 - 11.0 10e3/uL    RBC Count 3.93 (L) 4.40 - 5.90 10e6/uL    Hemoglobin 12.3 (L) 13.3 - 17.7 g/dL    Hematocrit 35.9 (L) 40.0 - 53.0 %    MCV 91 78 - 100 fL    MCH 31.3 26.5 - 33.0 pg    MCHC 34.3 31.5 - 36.5 g/dL    RDW 12.6 10.0 - 15.0 %    Platelet Count 148 (L) 150 - 450 10e3/uL    % Neutrophils 61 %    % Lymphocytes 28 %    % Monocytes 9 %    % Eosinophils 2 %    % Basophils 0 %    % Immature Granulocytes 0 %    NRBCs per 100 WBC 0 <1 /100    Absolute Neutrophils 3.1 1.6 - 8.3 10e3/uL    Absolute Lymphocytes 1.4 0.8 - 5.3 10e3/uL    Absolute Monocytes 0.4 0.0 - 1.3 10e3/uL    Absolute Eosinophils 0.1 0.0 - 0.7 10e3/uL    Absolute Basophils 0.0 0.0 - 0.2 10e3/uL    Absolute Immature Granulocytes 0.0 <=0.0 10e3/uL    Absolute NRBCs 0.0 10e3/uL

## 2021-10-07 NOTE — PROGRESS NOTES
Vascular Surgery Progress Note    S:No complaints thru night.    O: Still in ICU due to no beds to transfer out to floor.  Vitals:  BP  Min: 121/65  Max: 166/83  Temp  Av.2  F (36.8  C)  Min: 97.9  F (36.6  C)  Max: 98.9  F (37.2  C)  Pulse  Av.4  Min: 56  Max: 102  I/O last 3 completed shifts:  In: 3367.5 [P.O.:1110; I.V.:2257.5]  Out: 4125 [Urine:4125]    Physical Exam:Alert   Comfortable.            ACE wrap to left arm.  Normal CMS       On IV Heparin.       Reviewed Venogram with patient.      Assessment/Plan:#1.   Plan TOS surgery later today.  Keep on IV Heparin.       #2.  Dyslipidemia-- appreciate Dr Street's recommendation.      Wm. Scot MD

## 2021-10-07 NOTE — ANESTHESIA PROCEDURE NOTES
Airway       Patient location during procedure: OR       Procedure Start/Stop Times: 10/7/2021 5:19 PM  Staff -        Anesthesiologist:  Gary Tovar MD       CRNA: Pratibha Carrillo APRN CRNA       Performed By: CRNAIndications and Patient Condition       Indications for airway management: eusebio-procedural       Induction type:intravenous       Mask difficulty assessment: 1 - vent by mask    Final Airway Details       Final airway type: endotracheal airway       Successful airway: ETT - single  Endotracheal Airway Details        ETT size (mm): 8.0       Cuffed: yes       Successful intubation technique: direct laryngoscopy       DL Blade Type: MAC 3       Grade View of Cords: 2       Adjucts: stylet       Position: Right       Measured from: lips       Secured at (cm): 24       Bite block used: None    Post intubation assessment        Placement verified by: capnometry, equal breath sounds and chest rise        Number of attempts at approach: 1       Number of other approaches attempted: 0       Secured with: pink tape       Ease of procedure: easy       Dentition: Intact and Unchanged

## 2021-10-08 VITALS
RESPIRATION RATE: 15 BRPM | WEIGHT: 236.33 LBS | HEIGHT: 75 IN | DIASTOLIC BLOOD PRESSURE: 67 MMHG | HEART RATE: 72 BPM | BODY MASS INDEX: 29.39 KG/M2 | TEMPERATURE: 97.7 F | SYSTOLIC BLOOD PRESSURE: 128 MMHG | OXYGEN SATURATION: 99 %

## 2021-10-08 LAB
BASOPHILS # BLD AUTO: 0 10E3/UL (ref 0–0.2)
BASOPHILS NFR BLD AUTO: 0 %
CREAT SERPL-MCNC: 1 MG/DL (ref 0.66–1.25)
EOSINOPHIL # BLD AUTO: 0 10E3/UL (ref 0–0.7)
EOSINOPHIL NFR BLD AUTO: 0 %
ERYTHROCYTE [DISTWIDTH] IN BLOOD BY AUTOMATED COUNT: 12.7 % (ref 10–15)
GFR SERPL CREATININE-BSD FRML MDRD: >90 ML/MIN/1.73M2
GLUCOSE BLDC GLUCOMTR-MCNC: 106 MG/DL (ref 70–99)
GLUCOSE BLDC GLUCOMTR-MCNC: 114 MG/DL (ref 70–99)
HCT VFR BLD AUTO: 36.1 % (ref 40–53)
HGB BLD-MCNC: 12.3 G/DL (ref 13.3–17.7)
IMM GRANULOCYTES # BLD: 0 10E3/UL
IMM GRANULOCYTES NFR BLD: 0 %
LYMPHOCYTES # BLD AUTO: 0.9 10E3/UL (ref 0.8–5.3)
LYMPHOCYTES NFR BLD AUTO: 11 %
MCH RBC QN AUTO: 31.2 PG (ref 26.5–33)
MCHC RBC AUTO-ENTMCNC: 34.1 G/DL (ref 31.5–36.5)
MCV RBC AUTO: 92 FL (ref 78–100)
MONOCYTES # BLD AUTO: 0.6 10E3/UL (ref 0–1.3)
MONOCYTES NFR BLD AUTO: 7 %
NEUTROPHILS # BLD AUTO: 6.8 10E3/UL (ref 1.6–8.3)
NEUTROPHILS NFR BLD AUTO: 82 %
NRBC # BLD AUTO: 0 10E3/UL
NRBC BLD AUTO-RTO: 0 /100
PLATELET # BLD AUTO: 163 10E3/UL (ref 150–450)
RBC # BLD AUTO: 3.94 10E6/UL (ref 4.4–5.9)
UFH PPP CHRO-ACNC: <0.1 IU/ML
WBC # BLD AUTO: 8.4 10E3/UL (ref 4–11)

## 2021-10-08 PROCEDURE — 250N000013 HC RX MED GY IP 250 OP 250 PS 637: Performed by: STUDENT IN AN ORGANIZED HEALTH CARE EDUCATION/TRAINING PROGRAM

## 2021-10-08 PROCEDURE — 258N000003 HC RX IP 258 OP 636: Performed by: STUDENT IN AN ORGANIZED HEALTH CARE EDUCATION/TRAINING PROGRAM

## 2021-10-08 PROCEDURE — 99233 SBSQ HOSP IP/OBS HIGH 50: CPT | Performed by: INTERNAL MEDICINE

## 2021-10-08 PROCEDURE — 99239 HOSP IP/OBS DSCHRG MGMT >30: CPT | Performed by: HOSPITALIST

## 2021-10-08 PROCEDURE — 85025 COMPLETE CBC W/AUTO DIFF WBC: CPT | Performed by: STUDENT IN AN ORGANIZED HEALTH CARE EDUCATION/TRAINING PROGRAM

## 2021-10-08 PROCEDURE — 999N000127 HC STATISTIC PERIPHERAL IV START W US GUIDANCE

## 2021-10-08 PROCEDURE — 85520 HEPARIN ASSAY: CPT | Performed by: STUDENT IN AN ORGANIZED HEALTH CARE EDUCATION/TRAINING PROGRAM

## 2021-10-08 PROCEDURE — 250N000013 HC RX MED GY IP 250 OP 250 PS 637: Performed by: INTERNAL MEDICINE

## 2021-10-08 PROCEDURE — 82565 ASSAY OF CREATININE: CPT | Performed by: STUDENT IN AN ORGANIZED HEALTH CARE EDUCATION/TRAINING PROGRAM

## 2021-10-08 PROCEDURE — 36415 COLL VENOUS BLD VENIPUNCTURE: CPT | Performed by: STUDENT IN AN ORGANIZED HEALTH CARE EDUCATION/TRAINING PROGRAM

## 2021-10-08 PROCEDURE — 250N000013 HC RX MED GY IP 250 OP 250 PS 637: Performed by: SURGERY

## 2021-10-08 RX ORDER — OXYCODONE HYDROCHLORIDE 5 MG/1
5 TABLET ORAL EVERY 4 HOURS PRN
Status: DISCONTINUED | OUTPATIENT
Start: 2021-10-08 | End: 2021-10-08

## 2021-10-08 RX ORDER — OXYCODONE HYDROCHLORIDE 5 MG/1
5 TABLET ORAL EVERY 6 HOURS PRN
Qty: 15 TABLET | Refills: 0 | Status: SHIPPED | OUTPATIENT
Start: 2021-10-08 | End: 2021-10-11

## 2021-10-08 RX ORDER — AMOXICILLIN 250 MG
1 CAPSULE ORAL 2 TIMES DAILY PRN
Qty: 30 TABLET | Refills: 0 | Status: SHIPPED | OUTPATIENT
Start: 2021-10-08 | End: 2022-01-28

## 2021-10-08 RX ORDER — OXYCODONE HYDROCHLORIDE 5 MG/1
5-10 TABLET ORAL EVERY 4 HOURS PRN
Status: DISCONTINUED | OUTPATIENT
Start: 2021-10-08 | End: 2021-10-08

## 2021-10-08 RX ORDER — ACETAMINOPHEN 325 MG/1
650 TABLET ORAL EVERY 6 HOURS PRN
COMMUNITY
Start: 2021-10-08 | End: 2022-01-28

## 2021-10-08 RX ADMIN — RIVAROXABAN 15 MG: 15 TABLET, FILM COATED ORAL at 09:05

## 2021-10-08 RX ADMIN — IBUPROFEN 600 MG: 600 TABLET ORAL at 16:07

## 2021-10-08 RX ADMIN — SERTRALINE HYDROCHLORIDE 100 MG: 100 TABLET ORAL at 09:05

## 2021-10-08 RX ADMIN — IBUPROFEN 600 MG: 600 TABLET ORAL at 04:16

## 2021-10-08 RX ADMIN — Medication 1 LOZENGE: at 00:55

## 2021-10-08 RX ADMIN — Medication 1 LOZENGE: at 06:42

## 2021-10-08 RX ADMIN — ACETAMINOPHEN 650 MG: 325 TABLET, FILM COATED ORAL at 04:15

## 2021-10-08 RX ADMIN — ROSUVASTATIN CALCIUM 40 MG: 20 TABLET, FILM COATED ORAL at 09:05

## 2021-10-08 RX ADMIN — ACETAMINOPHEN 650 MG: 325 TABLET, FILM COATED ORAL at 09:05

## 2021-10-08 RX ADMIN — IBUPROFEN 600 MG: 600 TABLET ORAL at 09:05

## 2021-10-08 RX ADMIN — ACETAMINOPHEN 650 MG: 325 TABLET, FILM COATED ORAL at 16:07

## 2021-10-08 RX ADMIN — DOCUSATE SODIUM 100 MG: 100 CAPSULE, LIQUID FILLED ORAL at 09:04

## 2021-10-08 RX ADMIN — FENOFIBRATE 160 MG: 160 TABLET ORAL at 09:04

## 2021-10-08 RX ADMIN — SODIUM CHLORIDE, PRESERVATIVE FREE: 5 INJECTION INTRAVENOUS at 00:19

## 2021-10-08 ASSESSMENT — ACTIVITIES OF DAILY LIVING (ADL)
ADLS_ACUITY_SCORE: 7
ADLS_ACUITY_SCORE: 5
ADLS_ACUITY_SCORE: 5
ADLS_ACUITY_SCORE: 7
ADLS_ACUITY_SCORE: 5

## 2021-10-08 NOTE — ANESTHESIA POSTPROCEDURE EVALUATION
Patient: Pool Ontiveros    Procedure: Procedure(s):  LEFT TRANSAXILLARY FIRST RIB RESECTION AND SCALENECTOMY.       Diagnosis:Acute deep vein thrombosis (DVT) of other vein of upper extremity, unspecified laterality (H) [I82.629]  Diagnosis Additional Information: No value filed.    Anesthesia Type:  General    Note:     Postop Pain Control: Uneventful            Sign Out: Well controlled pain   PONV: No   Neuro/Psych: Uneventful            Sign Out: Acceptable/Baseline neuro status   Airway/Respiratory: Uneventful            Sign Out: Acceptable/Baseline resp. status   CV/Hemodynamics: Uneventful            Sign Out: Acceptable CV status; No obvious hypovolemia; No obvious fluid overload   Other NRE: NONE   DID A NON-ROUTINE EVENT OCCUR? No           Last vitals:  Vitals Value Taken Time   /69 10/07/21 2110   Temp 36.6  C (97.8  F) 10/07/21 2020   Pulse 100 10/07/21 2114   Resp 8 10/07/21 2114   SpO2 98 % 10/07/21 2116   Vitals shown include unvalidated device data.    Electronically Signed By: Gary Tovar MD  October 8, 2021  12:09 AM

## 2021-10-08 NOTE — PLAN OF CARE
Pt back from PACU @ 1823. Per report from PACU Dr. Ellison to decide when heparin gtt restarted. JORGE LUIS drain w 90 mL of output from 1900 until 0000 - Dr. Ellison advised to hold heparin gtt until he reassess's in AM. Pt dangled at bedside, ambulated. Ambulates well w SBA, GB - pt endorsing lightheadedness at times. EBL in surgery was 300 mL. VSS - tele sinus tach/NSR. Pt stable on RA. Dressings CDI to L side/back. Wife updated this shift on status.

## 2021-10-08 NOTE — PROGRESS NOTES
VASCULAR SURGERY    Pool Ontiveros has been doing well throughout the afternoon.  He was ambulated several times..  Minimal discomfort from the surgical site.    Abhijit-Freedman drainage is decreasing with approximately 40 mL of serosanguineous fluid over the last 4 hours.  Xarelto has been initiated this morning at 15 mg twice daily.    At the present time is alert and appropriate.  Very comfortable.  Serosanguineous fluid from Abhijit-Freedman drain.  Excellent use of incentive spirometer both of 2 L.  He is anxious to go home with his family.    PLAN: Discharge this evening with JORGE LUIS drain and incentive spirometry.  Will record output and I will be talking him decide when this should be removed.  Will be on the Xarelto.  Plan follow-up venogram in 10 to 14 days.        Jovi Ellison MD

## 2021-10-08 NOTE — PROVIDER NOTIFICATION
Paged vascular, Aidee Lucas regarding when to discontinue JORGE LUIS drain? Patient and spouse has questions and concerns related to discharge and would like to talk prior to discharge. Awaiting for call back.     @1040: received call back, patient will be discharge home with JORGE LUIS drain, orders will be put in discharge.  Either Dr. Ellison or Dr Aidee Lucas will call patient wife, phone number provided.  Updated patient and spouse.

## 2021-10-08 NOTE — OP NOTE
Procedure Date: 10/07/2021    PREOPERATIVE DIAGNOSIS:  Vasogenic left thoracic outlet syndrome with axillary/subclavian vein deep venous thrombosis.    POSTOPERATIVE DIAGNOSIS:    1.  Vasogenic left thoracic outlet syndrome with axillary/subclavian deep venous thrombosis.     a.  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    ANESTHESIA:  General.    PREOPERATIVE MEDICATIONS:  Ancef 2 grams IV.    SURGEON:  Jovi Ellison MD    :  Kobi Mccann MD ([facility] resident).    ASSISTANT:  Catrina Hare PA-C    INDICATIONS:  A 41-year-old healthy patient developed acute onset of left arm swelling and discomfort.  Further evaluation revealed occlusion of the left axillary and subclavian vein, consistent with vasogenic thoracic outlet syndrome.  Also significant thrombus within the brachial veins.  He has undergone lytic therapy along with venoplasty of the subclavian vein,with good lysis of the clot.  However, with balloon venoplasty there was significant rebound.  Vein was completely occluded at the thoracic inlet with his arm elevated.  We felt that without surgical decompression, this would certainly re-clot.  He has been on intravenous heparin since completing the interventional radiology procedure last evening.  He comes to the operating room today under informed consent.  He is a larger patient, with a height of 6 feet 3 inches, weight 107.2 kilograms, and BMI 29.54 kg/m2, very muscular.    DESCRIPTION OF PROCEDURE:  The patient was brought to the operating room, induced under general anesthesia, orally intubated without difficulty.  Calf pneumatic compression boots were used.  Placed in a partial left lateral thoracotomy position with appropriate padding.  Intravenous heparin had been discontinued.  Left arm and axillary area prepped and draped.  Timeout  was called, and the sites were identified.    A left transaxillary transverse incision was made.  Dissection was carried down to the chest wall.  With aid of loupe magnification and light source with electrocautery, we dissected down to the first rib.  A crossing vein in the deep subcutaneous tissue was divided between 4-0 silk suture.  With his large body habitus, exposure was difficult, but this was able to be maintained with the arm retraction by the assistants.  The vein, as expected, was quite inflamed from the recent DVT, but was visualized and appeared to be patent.  We noticed a marked abnormality of the midportion of the first rib.  This was markedly thickened.  It appeared to be fused to the second rib, likely was a congenital anomaly.  The segment measured approximately 3 x 3 cm, which was very abnormal compared to the normal, flat first rib.  This made exposure much more difficult.    Intercostal muscles were divided with electrocautery.  We identified the middle scalene muscles, and the subclavian artery and brachial plexus were unremarkable, and these were gently retracted.  The middle scalene muscle was divided off the first rib, which was more normal posteriorly, with electrocautery with good hemostasis being noted.  The anterior scalene muscle was markedly enlarged and compressing against the subclavian vein and likely the cause of the occlusion and radiographic findings.  It was difficult to get very difficult to get our Tena elevator under the rib due to the cartilaginous attachment as mentioned.  We were able to get a right angle clamp under the anterior scalene muscle with good visualization of the vein and artery and divided this in its entirety.  This measured almost 2 cm in diameter.  All fibers were divided so it retracted well above the subclavian artery and vein.    Rib resection:  Due to the anomalous rib, we could not use our typical rib cutters.  We had to use a Leksell rongeur to  excise the bone close to the subclavian vein and also quite posteriorly.  Upon doing so, we were able to mobilize and divide the cartilaginous attachment of the anomalous rib to the second rib and removed this in its entirety.  Posteriorly with deep retractors, we used a Leksell rongeur until we were on the posterior transverse segment.  Good hemostasis was obtained with electrocautery in the bone marrow.    Anteriorly, with visualization of the inflamed subclavian vein, we divided in its entirety the costoclavicular ligament.  We then rongeured the medial portion of the first rib more medially but did not go all the way to the cartilaginous attachment.  Bleeding we had noticed previously from the bone marrow did cease with the removal of the rib.    Mobilization of subclavian vein:  To make sure there was no extrinsic compression remaining on the subclavian vein, we divided a portion of the subclavius muscle.  We freed this up medially.  Surrounding inflamed tissue that was slightly compressing the vein was then divided with the Metzenbaum scissors and electrocautery under loupe magnification.  On the more lateral aspect of the vein, we did have a small bleeding branch from the vein with mobilization of the extrinsic compressive tissue, and this was easily repaired with a mattress 5-0 Prolene suture, with no luminal encroachment noted.    We now allowed complete inspection of the surgical site.  There was no evidence of any extrinsic compression of the subclavian vein and artery.  Good dry field was noted.  A small pleural tear did occur with removal of the anomalous rib.  A #19 fluted Carlos drain was brought through an inferior stab incision and placed within the incision with the tip into the pleura via the pleural tear and hooked to bulb suction.    The wound was closed in layers with interrupted 3-0 Vicryl deep and the skin with 4-0 Monocryl in subcuticular fashion.  Toradol 15 mg IV was given intravenously for  postop analgesia along with a field block of 0.5% Marcaine.  Sterile dressings were applied.    The patient tolerated the procedure well.    ESTIMATED BLOOD LOSS:  300 mL (due to difficulty removing the rib).     COUNTS:  Needle and sponge counts were correct x 2.      The procedure was much more difficult than the normal procedure, due to the inflamed subclavian vein, and also the anomalous rib being very difficult to remove and his body habitus-  Wt= 107 kg and is quite muscular.  Procedure lasting 3 hours.  Blood loss was also more than normal at 300 mL.    Catrina Hare PA-C's assistance was required for appropriate retraction to visualize the operative site for safe completion of the procedure.    Jovi Ellison MD        D: 10/07/2021   T: 10/08/2021   MT: TOMAS    Name:     CATERINA WALLACE JOSEFINA  MRN:      [id number]        Account:        [id number]   :      1980           Procedure Date: 10/07/2021     Document: [id number]

## 2021-10-08 NOTE — PHARMACY-RX INSURANCE COVERAGE
Anticoagulation coverage check.  Patient has [employer] through an employer.    Xarelto/Eliquis:  $75/mo. Upon receipt of RX [facility] can provide copay savings card to reduce this to $10/mo.    Enoxaparin 120mg x 10 syringes: $23.     Jantoven (warfarin): $4/mo.      -KERRI Anderson, Pharmacy Technician/Liaison, Discharge Pharmacy [phone]

## 2021-10-08 NOTE — PROGRESS NOTES
Discharge Note    Patient discharged to home via private vehicle  accompanied by significant other .  IV: Discontinued  Prescriptions e-prescribed to pharmacy.   Belongings reviewed and sent with patient.   Home medications returned to patient: NA  Equipment sent with: patient, N/A. JORGE LUIS drain in place with extra dressings and tape sent with patient.  patient verbalizes understanding of discharge instructions. AVS given to patient.

## 2021-10-08 NOTE — PROGRESS NOTES
[facility]    Medicine Progress Note - Hospitalist Service       Date of Admission:  10/5/2021    Assessment & Plan         Pool Ontiveros is a 41 year old male who is essentially healthy other than having depression hyperlipidemia.  He recently resumed a weight training regimen and noticed that his left arm felt a little tight.  In the emergency room he was found to have a left upper extremity DVT with thrombosis in the subclavian and axillary veins.  He was started on intravenous heparin, is being evaluated by interventional radiology and vascular surgery and is being admitted to the hospital.     Left upper extremity(subclavian and axillary) deep venous thrombosis likely due to thoracic outlet syndrome   S/P IR thrombolysis 10/5 and IR mechanical thrombectomy 10/6  S/P Left transaxillary first rib resection and scalenectomy, mobilization left subclavian vein  The patient was started on IV heparin in the emergency room.  The interventional radiologist is going to perform a thrombectomy.  The vascular surgeon has seen him and he will likely have decompressive surgery.    Continue intravenous heparin - defer to surgery    Chest X-ray    Interventional radiology and vascular surgery consultations     TOS surgery as above 10/7 - post op cares per surgery    JORGE LUIS in place, pending output, possible removal and discharge today/tmrw if okay with surgery and pain control ok     Hyperlipidemia -likely lipoprotein lipase deficiency  He has been prescribed gemfibrozil but has not started it. Vascular medicine consulted and appreciated. Elevated triglycerides over 500 with direct . Concur with vascular medicine-myositis risk is elevated particularly with gemfibrozil and statins. Gemfibrozil should be discontinued (was not started in hospital).  - Vascular medicine has started fenofibrate and rosuvastatin  - Vascular medicine follow-up as outpatient with advanced lipid testing in 3 months and  subsequent office visit with Dr. Street     Depression     Continue sertraline      COVID 19 screening  Low suspicion. PCR negative 10/5      Diet: Regular Diet Adult  Diet    DVT Prophylaxis: DOAC  Street Catheter: Not present  Central Lines: None  Code Status: Full Code      Disposition Plan   Expected discharge: 10/08/2021   recommended to home pending surgery re-eval and drainage output. Maybe stay til Saturday otherwise.     The patient's care was discussed with the Bedside Nurse, Patient and Patient's Family.    Blake Aguirre MD  Hospitalist Service    Securely message with the Vocera Web Console (learn more here)  Text page via [employer] Paging/Directory      Clinically Significant Risk Factors Present on Admission               ______________________________________________________________________    Interval History   Patient seen and examined late morning.  Patient spouse at bedside.  JORGE LUIS drain noted on left axilla.  No fever/chills, shortness of breath or pain.  Some serosanguineous output noted in bulb.  Patient reports Dr. Ellison stated he might be able to go home today versus tomorrow pending output volume and clinical course.    Data reviewed today: I reviewed all medications, new labs and imaging results over the last 24 hours. I personally reviewed no images or EKG's today.    Physical Exam   Vital Signs: Temp: 97.7  F (36.5  C) Temp src: Oral BP: 128/67 Pulse: 72   Resp: 16 SpO2: 99 % O2 Device: None (Room air) Oxygen Delivery: 3 LPM  Weight: 236 lbs 5.33 oz    Gen: NAD, very pleasant  HEENT: Normocephalic, EOMI, MMM  Resp: no crackles,  no wheezes, no increased work of resp  CV: S1S2 heard, reg rhythm, reg rate, no pedal edema  L axilla bandage in place, JORGE LUIS drain noted with some serosang output in bulb  Abdo: soft, nontender, nondistended, bowel sounds present  Ext: calves nontender, well perfused  Neuro: AAOx3, CN grossly intact, no facial asymmetry      Data    Recent Labs   Lab 10/08/21  0848 10/08/21  0812 10/08/21  0604 10/07/21  0501 10/06/21  1535 10/06/21  0718 10/06/21  0505 10/06/21  0505 10/05/21  2042 10/05/21  1237   WBC 8.4  --   --  5.1 5.3  --    < > 6.2  --  7.2   HGB 12.3*  --   --  12.3* 12.6*  --    < > 13.8  --  14.2   MCV 92  --   --  91 90  --    < > 92  --  91     --   --  148* 142*  --    < > 171  --  181   INR  --   --   --   --   --   --   --   --   --  1.03   NA  --   --   --   --   --   --   --  139  --  140   POTASSIUM  --   --   --   --   --   --   --  4.0  --  4.1   CHLORIDE  --   --   --   --   --   --   --  109  --  106   CO2  --   --   --   --   --   --   --  25  --  24   BUN  --   --   --   --   --   --   --  18  --  13   CR 1.00  --   --  0.95  --   --   --  1.06  --  1.06   ANIONGAP  --   --   --   --   --   --   --  5  --  10   NIYA  --   --   --   --   --   --   --  7.4*  --  9.0   GLC  --  114* 106*  --   --  99  --  113*   < > 91   ALBUMIN  --   --   --   --   --   --   --   --   --  4.0   PROTTOTAL  --   --   --   --   --   --   --   --   --  7.7   BILITOTAL  --   --   --   --   --   --   --   --   --  0.5   ALKPHOS  --   --   --   --   --   --   --   --   --  67   ALT  --   --   --   --   --   --   --   --   --  31   AST  --   --   --   --   --   --   --   --   --  26   TROPONIN  --   --   --   --   --   --   --   --   --  <0.015    < > = values in this interval not displayed.     Recent Results (from the past 24 hour(s))   XR Chest Port 1 View    Narrative    EXAM: XR CHEST PORT 1 VIEW  LOCATION: [facility]  DATE/TIME: 10/7/2021 8:47 PM    INDICATION: Rib Resection  COMPARISON: 10/05/2021      Impression    IMPRESSION: No focal infiltrate, pleural effusion or pneumothorax. Drain projecting over the left upper chest. Left first rib resection. Mild subcutaneous gas in the left low neck soft tissues.

## 2021-10-08 NOTE — DISCHARGE SUMMARY
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 to evaluate   after surgery.  The following labs/tests are recommended: We will schedule   repeat left arm venogram in 10 to 14 days with interventional radiology             Unresulted Labs Ordered in the Past 30 Days of this Admission     Date and Time Order Name Status Description    10/7/2021  7:55 PM Surgical Pathology Exam In process       These results will be followed up by Surgery    Discharge Disposition   Discharged to home with spouse  Condition at discharge: Stable      Hospital Course   Pool Ontiveros is a 41 year old male who is essentially healthy other than having depression hyperlipidemia.  He recently resumed a weight training regimen and noticed that his left arm felt a little tight.  In the emergency room he was found to have a left upper extremity DVT with thrombosis in the subclavian and axillary veins.  He was started on intravenous heparin, is being evaluated by interventional radiology and vascular surgery and is being admitted to the hospital.     Left upper extremity(subclavian and axillary) deep venous thrombosis likely due to thoracic outlet syndrome   S/P IR thrombolysis 10/5 and IR mechanical thrombectomy 10/6  S/P Left transaxillary first rib resection and scalenectomy,  mobilization left subclavian vein  The patient was started on IV heparin in the emergency room.  The interventional radiologist is going to perform a thrombectomy.  The vascular surgeon has seen him and he will likely have decompressive surgery.    Continue intravenous heparin - defer to surgery    Chest X-ray    Interventional radiology and vascular surgery consultations     TOS surgery as above 10/7 - post op cares per surgery    JORGE LUIS in place, pending output, possible removal and discharge today/tmrw if okay with surgery and pain control ok    Vascular surgery re-evaluated later in the day - ok to discharge with JORGE LUIS in place, will contact for follow up and directions to record volume of output. Removal will be based on the output.      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 Vascular medicine consulted and appreciated. Elevated triglycerides over 500 with direct . Concur with vascular medicine-myositis risk is elevated particularly with gemfibrozil and statins. Gemfibrozil should be discontinued (was not started in hospital).  - Vascular medicine has started fenofibrate and rosuvastatin  - Vascular medicine follow-up as outpatient with advanced lipid testing in 3 months and subsequent office visit with Dr. Street     Depression     Continue sertraline      COVID 19 screening  Low suspicion. PCR negative 10/5    Consultations This Hospital Stay   PHARMACY IP CONSULT  PHARMACY IP CONSULT  PHARMACY IP CONSULT  VASCULAR SURGERY IP CONSULT  PHARMACY IP CONSULT  PHARMACY IP CONSULT  SMOKING CESSATION PROGRAM IP CONSULT    Code Status   Full Code    Time Spent on this Encounter   I, Blake Aguirre MD, personally saw the patient today and spent greater than 30 minutes discharging this patient.       Blake Aguirre MD  [place] [id number] [facility]  [address] [place] [address]  Phone:  [phone]  ______________________________________________________________________    Physical Exam   Vital Signs: Temp: 97.7  F (36.5  C) Temp src: Oral BP: 128/67 Pulse: 72   Resp: 15 SpO2: 99 % O2 Device: None (Room air)    Weight: 236 lbs 5.33 oz    Gen: NAD, very pleasant  HEENT: Normocephalic, EOMI, MMM  Resp: no crackles,  no wheezes, no increased work of resp  CV: S1S2 heard, reg rhythm, reg rate, no pedal edema  L axilla bandage in place, JORGE LUIS drain noted with some serosang output in bulb  Abdo: soft, nontender, nondistended, bowel sounds present  Ext: calves nontender, well perfused, NVI distally in LUE  Neuro: AAOx3, CN grossly intact, no facial asymmetry         Primary Care Physician   Paco Bonilla    Discharge Orders      LipoFit by NMR     Lipoprotein (a)     CRP cardiac risk     Hemoglobin A1c     Brief Discharge Instructions    Prior to departing the hospital, discharging RN to call [phone] to arrange:  1-Labs as ordered by Dr. Street to be performed in January, 2022.  2-F/U in person with Dr. Street two weeks after above labs.    Dr. Street's office will call you or send letter to help you schedule these future appointments.     Follow-up and recommended labs and tests     Follow up with me,  Jovi Ellison, within 2 weeks. to evaluate after surgery.  The following labs/tests are recommended: We will schedule repeat left arm venogram in 10 to 14 days with interventional radiology     Activity    Your activity upon discharge: activity as tolerated     Reason for your hospital stay    Left arm swelling and found to have DVT and thoracic outlet syndrome     Diet    Follow this diet upon discharge: Advance to a regular diet as tolerated       Significant Results and Procedures   Most Recent 3 CBC's:Recent Labs   Lab Test 10/08/21  0848 10/07/21  0501 10/06/21  1535   WBC 8.4 5.1 5.3   HGB 12.3* 12.3* 12.6*   MCV 92 91 90    148* 142*     Most Recent 3 BMP's:Recent Labs   Lab Test  10/08/21  0848 10/08/21  0812 10/08/21  0604 10/07/21  0501 10/06/21  0718 10/06/21  0505 10/05/21  2042 10/05/21  1237 07/28/21  0849 07/28/21  0849   NA  --   --   --   --   --  139  --  140  --  137   POTASSIUM  --   --   --   --   --  4.0  --  4.1  --  4.0   CHLORIDE  --   --   --   --   --  109  --  106  --  107   CO2  --   --   --   --   --  25  --  24  --  21   BUN  --   --   --   --   --  18  --  13  --  15   CR 1.00  --   --  0.95  --  1.06  --  1.06   < > 1.02   ANIONGAP  --   --   --   --   --  5  --  10  --  9   NIYA  --   --   --   --   --  7.4*  --  9.0  --  9.4   GLC  --  114* 106*  --  99 113*   < > 91   < > 107*    < > = values in this interval not displayed.     Most Recent Cholesterol Panel:Recent Labs   Lab Test 07/28/21  0849   CHOL 274*   *   HDL 50   TRIG 521*   ,   Results for orders placed or performed during the hospital encounter of 10/05/21   US Upper Extremity Venous Duplex Left    Narrative    US UPPER EXTREMITY VENOUS DUPLEX LEFT 10/5/2021 3:31 PM    CLINICAL HISTORY/INDICATION: Arm tightness, left.    COMPARISON:  None relevant    TECHNIQUE:   Grayscale, color-flow, and spectral waveform analysis were performed  of the deep veins of the left upper extremity    FINDINGS:   The left jugular vein demonstrates normal compressibility, color-flow  and spectral waveform.    Occlusive deep vein thrombosis in the left subclavian and axillary  veins. The left brachial vein is patent without evidence of deep vein  thrombosis. Superficial thrombus is noted in the cephalic vein. The  basilic vein is patent.       Impression    IMPRESSION:   1. Occlusive deep vein thrombosis in the left subclavian and axillary  vein.  2. Superficial thrombus in the left cephalic vein.    Findings were discussed Dr. Loya in the ED at 3:43 PM on  10/5/2021.     LAZ LAM DO         SYSTEM ID:  [id number]   XR Chest Port 1 View    Narrative    EXAM: XR CHEST PORT 1 VIEW  LOCATION: [facility]  [facility]  DATE/TIME: 10/5/2021 4:48 PM    INDICATION: UPPER EXTREMITY DVT  COMPARISON: None.      Impression    IMPRESSION: Negative chest.   IR Upper Extremity Venogram Left    Narrative    IR UPPER EXTREMITY VENOGRAM LEFT 10/5/2021 7:45 PM    HISTORY: [age]-year-old patient with left upper extremity swelling and  diffuse deep venous thrombosis, concern for a thoracic outlet  syndrome. Plan is for thrombolysis.    TECHNIQUE: Patient was brought to the interventional radiology  department and informed consent obtained for thrombolysis, mechanical  thrombectomy, angioplasty, and venogram. Skin overlying the left upper  arm was prepped and draped in standard sterile fashion. 1% lidocaine  used for local anesthesia. Ultrasound was used to visualize the  basilic vein in the distal upper arm, found to be patent, and image  stored for documentation. With continuous ultrasound guidance, a  micropuncture kit was used to access the basilic vein. A venogram was  performed demonstrating acute thrombus throughout the basilic vein as  well as the brachial vein. A Berenstein catheter was advanced through  the area of occlusion of the subclavian vein and into the left  innominate vein where venogram was performed, and patency confirmed.  There is clearly stenotic segment in the subclavian vein. Given extent  of thrombus in both basilic and brachial veins, a stiff angled  Glidewire was advanced through the basilic vein, retrograde into the  brachial vein where venogram was performed. A 40 cm infusion length  catheter was then placed throughout the basilic and brachial veins,  areas of thrombosis. Plan will be to run TPA at 0.5 mg/hour at 500  units of heparin through the sheath. Patient will return the following  day for venogram and angioplasty in the subclavian vein for high  suspicion of thoracic outlet syndrome.    Sedation: Moderate level of sedation was achieved with 3 mg IV Versed  and 150 mcg IV  fentanyl.  Sedation time: 20 minutes  Please note the above medications were administered by the  interventional radiology staff under my direct supervision. The  patient's vital signs were monitored and remained stable throughout  the procedure.  Fluoroscopic time: 3.6 minutes.  Air (Kerma): 30 mGy.  Contrast: 20 mL of Isovue contrast administered without complication.  Local anesthetic: 4 mL of 1% lidocaine.    FINDINGS: A total of 7 spot fluoroscopic images and venogram sequences  obtained demonstrating diffuse thrombosis throughout the basilic and  brachial veins of the upper arm. The innominate and SVC vein segments  are patent. Tight stenosis in the subclavian vein is difficult to  navigate through this area. Infusion catheter extends antegrade in the  basilic vein, though retrograde into the brachial vein in effort to  maximize thrombolysis.      Impression    IMPRESSION: Thrombolysis of left upper extremity thoracic outlet  syndrome. Catheter extends into the basilic vein, and retrograde into  the brachial vein in an effort to liquefy as much thrombus as  possible. Patient will return the following day for venogram and  angioplasty of the subclavian vein. TPA will be given at 0.5 mg/hour  and 500 units of heparin through the sheath.    IDA UMANZOR MD         SYSTEM ID:  [id number]   IR Angiogram through Catheter Follow Up    Narrative    DATE: 10/6/2021    PROCEDURE:  LEFT LOWER EXTREMITY VENOGRAM   MECHANICAL THROMBECTOMY OF LEFT SUBCLAVIAN VEIN  THROMBOLYSIS CHECK AND TERMINATION OF THROMBOLYSIS.  MODERATE SEDATION    INTERVENTIONAL RADIOLOGIST: Eamon Riley MD    INDICATION: 41-year-old man with acute presentation of left subclavian  vein thrombosis in the setting of thoracic outlet syndrome. Underwent  initiation of thrombolysis on 10/5/2021. Here for lysis check and  possible completion thrombectomy.    CONSENT: The risks, benefits and alternatives of planned procedure  were discussed with the  patient  in detail. All questions were  answered. Informed consent was given to proceed with the procedure.    MODERATE SEDATION: Versed 5 mg IV; Fentanyl to 75 mcg IV. During the  time out, immediately prior to the administration of medications, the  patient was reassessed for adequacy to receive conscious sedation.   Under physician supervision, Versed and fentanyl were administered for  moderate sedation. Pulse oximetry, heart rate and blood pressure were  continuously monitored by an independent trained observer. The  physician spent 71 minutes of face-to-face sedation time with the  patient.    CONTRAST: 45 mL  ADDITIONAL MEDICATIONS: 16878 units Heparin IV    FLUOROSCOPIC TIME: 8.5 minutes.  RADIATION DOSE: Air Kerma: 162 mGy.    COMPLICATIONS: No immediate complications.    STERILE BARRIER TECHNIQUE: Maximum sterile barrier technique was used.  Cutaneous antisepsis was performed at the operative site with  application of 2% chlorhexidine and large sterile drape. Prior to the  procedure, the  and assistant performed hand hygiene and wore  hat, mask, sterile gown, and sterile gloves during the entire  procedure.    PROCEDURE: Left basilic vein sheath was inspected and appeared intact.  The existing 5 Tunisian infusion catheter was injected and a left upper  extremity and central venograms were obtained. A 5 Tunisian catheter and  Glidewire were manipulated into the right external iliac vein. Repeat  diagnostic venograms were performed through a 5 Tunisian catheter at the  level of the left subclavian vein.    Exchange Amplatz wire was advanced into the right external iliac vein.  6 Tunisian sheath was exchanged for the 13 Tunisian ClotTriever sheath  using sequential dilation. The ClotTriever catheter was inserted and 3  passes were made from the left brachiocephalic vein to the left  basilic vein. Following this, balloon angioplasty utilizing a 10 x 4  mm high pressure angioplasty balloon was inflated throughout  the  subclavian vein with visible waist at the site of previously seen  severe stenosis. There is near immediate elastic recoil on post  angioplasty venogram. Repeat ClotTriever mechanical thrombectomy was  performed 2 times through the stenotic segment with only minimal  residual thrombus removed.    Completion venograms were performed with the left arm in neutral,  series 13, arm down, series 14, and arm up, series 15.    The catheter and sheath was removed and a silk figure-of-eight suture  was used in addition to manual pressure to obtain hemostasis.    LEFT UPPER EXTREMITY AND CENTRAL VENOGRAM:  Following lysis there is  mild to moderate improvement in overall thrombus burden within the  left approximately and left subclavian vein. Following mechanical  thrombectomy there is significant improvement in the overall thrombus  burden with near-complete resolution. Persistent severe stenosis at  the left medial subclavian vein with prominent collateral present.  This was refractory to 10 mm balloon angioplasty. Final images  demonstrate severe stenosis at the left medial subclavian vein which  occludes with arm abduction.      Impression    IMPRESSION:    1.  Successful thrombolysis and mechanical thrombectomy. Near complete  removal of acute and chronic thrombus. Thrombolysis was terminated.  All sheaths and catheters were removed.   2.  Severe stenosis of the medial subclavian vein which completely  occludes with arm abduction. This is consistent with thoracic outlet  syndrome.  3.  Patient will continue on therapeutic heparin throughout the night.  Possible surgical decompression in the near future.    RHONDA HERNÁNDEZ MD         SYSTEM ID:  [id number]   XR Chest Port 1 View    Narrative    EXAM: XR CHEST PORT 1 VIEW  LOCATION: [facility]  DATE/TIME: 10/7/2021 8:47 PM    INDICATION: Rib Resection  COMPARISON: 10/05/2021      Impression    IMPRESSION: No focal infiltrate, pleural effusion  or pneumothorax. Drain projecting over the left upper chest. Left first rib resection. Mild subcutaneous gas in the left low neck soft tissues.       Discharge Medications   Discharge Medication List as of 10/8/2021  5:01 PM      START taking these medications    Details   acetaminophen (TYLENOL) 325 MG tablet Take 2 tablets (650 mg) by mouth every 6 hours as needed for mild pain or other (and adjunct with moderate or severe pain or per patient request), OTC      fenofibrate (TRIGLIDE/LOFIBRA) 160 MG tablet Take 1 tablet (160 mg) by mouth daily, Disp-90 tablet, R-3, E-Prescribe      oxyCODONE (ROXICODONE) 5 MG tablet Take 1 tablet (5 mg) by mouth every 6 hours as needed for pain, Disp-15 tablet, R-0, E-Prescribe      !! rivaroxaban ANTICOAGULANT (XARELTO ANTICOAGULANT) 15 MG TABS tablet Take 1 tablet (15 mg) by mouth 2 times daily (with meals), Disp-40 tablet, R-0, E-Prescribe      !! rivaroxaban ANTICOAGULANT (XARELTO ANTICOAGULANT) 15 MG TABS tablet Take 1 tablet (15 mg) by mouth 2 times daily (with meals), Disp-40 tablet, R-0, E-Prescribe      rosuvastatin (CRESTOR) 40 MG tablet Take 1 tablet (40 mg) by mouth daily, Disp-90 tablet, R-3, E-Prescribe      senna-docusate (SENOKOT-S/PERICOLACE) 8.6-50 MG tablet Take 1 tablet by mouth 2 times daily as needed for constipation, Disp-30 tablet, R-0, E-Prescribe       !! - Potential duplicate medications found. Please discuss with provider.      CONTINUE these medications which have NOT CHANGED    Details   sertraline (ZOLOFT) 100 MG tablet TAKE 1 TABLET BY MOUTH EVERY DAY, Disp-90 tablet, R-1, E-Prescribe         STOP taking these medications       gemfibrozil (LOPID) 600 MG tablet Comments:   Reason for Stopping:         ibuprofen (ADVIL/MOTRIN) 200 MG tablet Comments:   Reason for Stopping:             Allergies   Allergies   Allergen Reactions     Vicodin [Hydrocodone-Acetaminophen] Hives

## 2021-10-08 NOTE — PLAN OF CARE
Patient is A&O x4. On tele with SR. Up independent in room. Denies chest pain or SOB. Minimal pain with activity managed with scheduled tylenol and ibuprofen. Dressing CDI. JORGE LUIS drain patent with 80 mL. The bulb got disconnected from tube updated Charge RN, reconnect the JORGE LUIS tube with bulb. Up independent in room, voiding, passing flatus. Ambulated in hallway few times this shift with spouse. Patient is concern with more output this shift after ambulation. May discharge home this afternoon pending Dr Ellison or Dr. Aidee Lucas call.

## 2021-10-08 NOTE — ANESTHESIA CARE TRANSFER NOTE
Patient: Pool Ontiveros    Procedure: Procedure(s):  LEFT TRANSAXILLARY FIRST RIB RESECTION AND SCALENECTOMY.       Diagnosis: Acute deep vein thrombosis (DVT) of other vein of upper extremity, unspecified laterality (H) [I82.629]  Diagnosis Additional Information: No value filed.    Anesthesia Type:   General     Note:    Oropharynx: oropharynx clear of all foreign objects  Level of Consciousness: awake  Oxygen Supplementation: face mask    Independent Airway: airway patency satisfactory and stable  Dentition: dentition unchanged  Vital Signs Stable: post-procedure vital signs reviewed and stable  Report to RN Given: handoff report given  Patient transferred to: PACU    Handoff Report: Identifed the Patient, Identified the Reponsible Provider, Reviewed the pertinent medical history, Discussed the surgical course, Reviewed Intra-OP anesthesia mangement and issues during anesthesia, Set expectations for post-procedure period and Allowed opportunity for questions and acknowledgement of understanding      Vitals:  Vitals Value Taken Time   BP     Temp     Pulse     Resp     SpO2         Electronically Signed By: JOHN Hodges CRNA  October 7, 2021  8:18 PM

## 2021-10-08 NOTE — BRIEF OP NOTE
[facility]    Brief Operative Note    Pre-operative diagnosis: Acute deep vein thrombosis (DVT) of other vein of upper extremity, unspecified laterality (H) [I82.629]  Post-operative diagnosis Same as pre-operative diagnosis    Procedure: Procedure(s):  LEFT TRANSAXILLARY FIRST RIB RESECTION AND SCALENECTOMY.  Surgeon: Surgeon(s) and Role:     * Jovi Ellison - Primary     * Catrina Hare PA-C - Assisting  Anesthesia: General   Estimated Blood Loss: 300 mL from 10/7/2021  5:09 PM to 10/7/2021  8:15 PM      Drains: Abhijit-Freedman  Specimens:   ID Type Source Tests Collected by Time Destination   1 : ABNORMAL FIRST RIB Bone Resection Rib, Left SURGICAL PATHOLOGY EXAM Jovi Ellison 10/7/2021  7:54 PM      Findings:   vTOS s/p first rib resection and antererior scalenectomy. Very difficult dissection. A small wrent in the pulmonary pleura was made while removing bone. A JORGE LUIS drain was placed through the wound into the pleural cavity. .  Complications: None.  Implants: * No implants in log *    Kobi Mccann MD  General Surgery, PGY-4  Pager: [phone]

## 2021-10-08 NOTE — PROGRESS NOTES
Vascular Surgery Progress Note: POD#1    S: Overall fairly comfortable.  Breathing easily.    O:   Vitals:  BP  Min: 114/67  Max: 164/97  Temp  Av  F (36.7  C)  Min: 97.4  F (36.3  C)  Max: 98.6  F (37  C)  Pulse  Av.9  Min: 53  Max: 112  I/O last 3 completed shifts:  In: 2520 [P.O.:740; I.V.:1780]  Out: 2405 [Urine:2000; Drains:105; Blood:300]    Physical Exam: Alert and appropriate.  Comfortable.   Decreasing JORGE LUIS output with very minimal the last several hours.   Chest= clear to auscultation.    Assessment/Plan: Overall doing well.  Held heparin postoperatively due to JORGE LUIS output.   Morning hemoglobin pending.   Will start Xarelto this morning.   Possibly home later this morning    Wm. Scot MD

## 2021-10-08 NOTE — PROGRESS NOTES
[facility]  Vascular Medicine Progress Note          Assessment and Plan:         1.  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       -Catheter-directed thrombolysis was undertaken.  This successfully achieved, along with mechanical thrombectomy, near complete removal of acute and chronic thrombus.  Thrombolysis was therefore terminated.  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      -POD1 first rib resection and anterior scalenectomy.  JORGE LUIS output increased as patient became more ambulatory. Home today per Dr. Ellison if JORGE LUIS output decreases.    -Start Xarelto 15 mg p.o. b.i.d. x 21 days, then 20 mg daily thereafter.  Likely duration of anticoagulation will be 6 months.      -The patient will be seen in followup at the [facility] in 7-10 days post hospitalization by Pooja Pelaez PA-C.  At that time, Ms. Pelaez will order a D-dimer and left upper extremity venous duplex to be done in 3-6 months to determine if the patient could go off of anticoagulation at that time.  The patient will be seen in followup by myself to determine appropriate date of cessation of anticoagulation.        2.  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       The patient has been advised by his primary MD to go on gemfibrozil.  His triglycerides have been greater than 500.  His direct LDL was 125.  At present, I recommend the following:  A) I would actually recommend that the patient be on a statin as well as a fibric acid derivative.  Given the interaction between gemfibrozil and statins, the increasing risk of myositis, I will not prescribe gemfibrozil.  B) Initiate fenofibrate 160 mg daily.  C) Initiate Crestor 20 mg daily.  D) In 3 months check advanced lipid testing with followup with myself 2 weeks later.      There is no Vascular Medicine rounder this weekend, any medical care  required will be the hospitalist sandip.              Interval History:   doing well; no cp, sob, n/v/d, or abd pain.              Review of Systems:   The 10 point Review of Systems is negative other than noted in the HPI             Medications:       acetaminophen  650 mg Oral Q6H     docusate sodium  100 mg Oral BID     fenofibrate  160 mg Oral Daily     ibuprofen  600 mg Oral Q6H     rivaroxaban ANTICOAGULANT  15 mg Oral BID w/meals     rosuvastatin  40 mg Oral Daily     sertraline  100 mg Oral Daily     sodium chloride (PF)  3 mL Intracatheter Q8H     sodium chloride (PF)  3 mL Intracatheter Q8H                  Physical Exam:     Patient Vitals for the past 24 hrs:   BP Temp Temp src Pulse Resp SpO2   10/08/21 0905 -- -- -- -- 16 --   10/08/21 0838 -- -- -- -- 16 --   10/08/21 0700 135/76 98.3  F (36.8  C) Oral 73 16 98 %   10/08/21 0415 117/63 98.1  F (36.7  C) Oral 67 16 94 %   10/08/21 0230 114/67 98.6  F (37  C) Oral 92 17 97 %   10/08/21 0015 121/75 98.3  F (36.8  C) Oral 87 18 95 %   10/07/21 2230 121/78 97.9  F (36.6  C) Oral 102 17 95 %   10/07/21 2215 122/76 97.6  F (36.4  C) Oral 84 17 96 %   10/07/21 2143 125/82 97.4  F (36.3  C) Oral 94 16 95 %   10/07/21 2110 119/69 -- -- 87 13 97 %   10/07/21 2100 120/76 -- -- 96 14 98 %   10/07/21 2050 118/77 -- -- 109 15 97 %   10/07/21 2040 117/78 -- -- 94 15 97 %   10/07/21 2030 119/73 -- -- 104 17 96 %   10/07/21 2020 118/61 97.8  F (36.6  C) Temporal 112 19 98 %   10/07/21 1400 (!) 164/97 -- -- 56 15 100 %   10/07/21 1200 (!) 141/92 98  F (36.7  C) Oral 59 13 100 %     Wt Readings from Last 4 Encounters:   10/07/21 107.2 kg (236 lb 5.3 oz)   07/28/21 101.2 kg (223 lb)   05/03/19 101.2 kg (223 lb)   04/26/18 97.5 kg (215 lb)       Intake/Output Summary (Last 24 hours) at 10/7/2021 1014  Last data filed at 10/7/2021 1000  Gross per 24 hour   Intake 3297.5 ml   Output 3325 ml   Net -27.5 ml     Constitutional: normal  Eyes: normal  ENT: normal  Neck: Supple,  symmetrical, trachea midline, no adenopathy, thyroid symmetric, not enlarged and no tenderness, skin normal.  Hematologic / Lymphatic: normal  Back: normal  Lungs: No increased work of breathing, good air exchange, clear to auscultation bilaterally, no crackles or wheezing.  Cardiovascular: Regular rate and rhythm, normal S1 and S2, no S3 or S4, and no murmur noted.  Chest / Breast: normal  Abdomen: normal  Musculoskeletal: LUE ACE wrapped  Neurologic: normal  Neuropsychiatric: normal  Skin: normal           Data:     Results for orders placed or performed during the hospital encounter of 10/05/21 (from the past 24 hour(s))   XR Chest Port 1 View    Narrative    EXAM: XR CHEST PORT 1 VIEW  LOCATION: [facility]  DATE/TIME: 10/7/2021 8:47 PM    INDICATION: Rib Resection  COMPARISON: 10/05/2021      Impression    IMPRESSION: No focal infiltrate, pleural effusion or pneumothorax. Drain projecting over the left upper chest. Left first rib resection. Mild subcutaneous gas in the left low neck soft tissues.   Glucose by meter   Result Value Ref Range    GLUCOSE BY METER POCT 106 (H) 70 - 99 mg/dL   Glucose by meter   Result Value Ref Range    GLUCOSE BY METER POCT 114 (H) 70 - 99 mg/dL   CBC with platelets differential    Narrative    The following orders were created for panel order CBC with platelets differential.  Procedure                               Abnormality         Status                     ---------                               -----------         ------                     CBC with platelets and d...[[id number]]  Abnormal            Final result                 Please view results for these tests on the individual orders.   Creatinine   Result Value Ref Range    Creatinine 1.00 0.66 - 1.25 mg/dL    GFR Estimate >90 >60 mL/min/1.73m2   Heparin Unfractionated Anti Xa Level   Result Value Ref Range    Anti Xa Unfractionated Heparin <0.10 For Reference Range, See Comment IU/mL     Narrative    Therapeutic Range: UFH: 0.25-0.50 IU/mL for low intensity dosing,  0.30-0.70 IU/mL for high intensity dosing DVT and PE.  This test is not validated for other direct factor X inhibitors (e.g. rivaroxaban, apixaban, edoxaban, betrixaban, fondaparinux) and should not be used for monitoring of other medications.   CBC with platelets and differential   Result Value Ref Range    WBC Count 8.4 4.0 - 11.0 10e3/uL    RBC Count 3.94 (L) 4.40 - 5.90 10e6/uL    Hemoglobin 12.3 (L) 13.3 - 17.7 g/dL    Hematocrit 36.1 (L) 40.0 - 53.0 %    MCV 92 78 - 100 fL    MCH 31.2 26.5 - 33.0 pg    MCHC 34.1 31.5 - 36.5 g/dL    RDW 12.7 10.0 - 15.0 %    Platelet Count 163 150 - 450 10e3/uL    % Neutrophils 82 %    % Lymphocytes 11 %    % Monocytes 7 %    % Eosinophils 0 %    % Basophils 0 %    % Immature Granulocytes 0 %    NRBCs per 100 WBC 0 <1 /100    Absolute Neutrophils 6.8 1.6 - 8.3 10e3/uL    Absolute Lymphocytes 0.9 0.8 - 5.3 10e3/uL    Absolute Monocytes 0.6 0.0 - 1.3 10e3/uL    Absolute Eosinophils 0.0 0.0 - 0.7 10e3/uL    Absolute Basophils 0.0 0.0 - 0.2 10e3/uL    Absolute Immature Granulocytes 0.0 <=0.0 10e3/uL    Absolute NRBCs 0.0 10e3/uL

## 2021-10-09 ENCOUNTER — TELEPHONE (OUTPATIENT)
Dept: OTHER | Facility: CLINIC | Age: 41
End: 2021-10-09

## 2021-10-09 NOTE — TELEPHONE ENCOUNTER
[place] [facility]    I called Pool Ontiveros today.  Very comfortable after TOS surgery.  DRAIN IS PUTTING OUT ABOUT 75 ML/24 HRS OF SEROUS FLUID.    Plan:  Leave drain in-- check output tomorrow.            Venogram with Dr Zamarripa in 10-14 days.       Jovi Ellison Md

## 2021-10-10 ENCOUNTER — TELEPHONE (OUTPATIENT)
Dept: OTHER | Facility: CLINIC | Age: 41
End: 2021-10-10

## 2021-10-10 NOTE — TELEPHONE ENCOUNTER
[facility]    I called me at this afternoon.  He is doing very well.  He is on Xarelto 15 mg twice daily.  The drainage from the Carlos drain continues to decrease.  This is now serous fluid is had less than 15 mL output since earlier this morning.  If this continues to have very minimal output this evening we will pull the drain and I discussed how you do so.    Dr. Zamarripa is working on his schedule venogram in 10 to 14 days.  He will keep on his Xarelto until the venogram.    He had a very abnormal medial portion of the first rib fused to the second rib.  This was sent to pathology which is still pending.      Jovi Ellison MD

## 2021-10-11 ENCOUNTER — PATIENT OUTREACH (OUTPATIENT)
Dept: FAMILY MEDICINE | Facility: CLINIC | Age: 41
End: 2021-10-11

## 2021-10-11 DIAGNOSIS — E78.2 MIXED HYPERLIPIDEMIA: ICD-10-CM

## 2021-10-11 DIAGNOSIS — I82.629 ACUTE DEEP VEIN THROMBOSIS (DVT) OF OTHER VEIN OF UPPER EXTREMITY, UNSPECIFIED LATERALITY (H): Primary | ICD-10-CM

## 2021-10-11 NOTE — TELEPHONE ENCOUNTER
What type of discharge? Inpatient  Risk of Hospital admission or ED visit: 7%  Is a TCM episode required? Yes  When should the patient follow up with PCP? 14 days of discharge.    Macie Garcia RN  [facility]

## 2021-10-11 NOTE — TELEPHONE ENCOUNTER
Non detailed message left for pt to return call to clinic and ask to speak with a triage nurse.    Joy JOHNSON RN  EP Triage

## 2021-10-11 NOTE — TELEPHONE ENCOUNTER
"Hospital/TCU/ED for chronic condition Discharge Protocol    \"Hi, my name is Seema Smith RN, a registered nurse, and I am calling from [facility].  I am calling to follow up and see how things are going for you after your recent emergency visit/hospital/TCU stay.\"    Tell me how you are doing now that you are home?\" \"A little sore yet, but otherwise doing well.\"      Discharge Instructions    \"Let's review your discharge instructions.  What is/are the follow-up recommendations?  Pt. Response: instructions reviewed    \"Has an appointment with your primary care provider been scheduled?\"   No (schedule appointment)    \"When you see the provider, I would recommend that you bring your medications with you.\"    Medications    \"Tell me what changed about your medicines when you discharged?\"    Changes to chronic meds?    2 or more - Epic MTM referral needed    \"What questions do you have about your medications?\"    None     New diagnoses of heart failure, COPD, diabetes, or MI?    No              Post Discharge Medication Reconciliation Status: discharge medications reconciled, continue medications without change.    Was MTM referral placed (*Make sure to put transitions as reason for referral)?   Yes - \"The Medication Therapy  will call you within the next few days to schedule an appointment with an MTM pharmacist to discuss your medicines and make sure they are working as well as they possibly can for you. This visit can be done in an [facility] clinic or on the phone and is at no charge to you.\"    Call Summary    \"What questions or concerns do you have about your recent visit and your follow-up care?\"     none    \"If you have questions or things don't continue to improve, we encourage you contact us through the main clinic number (give number).  Even if the clinic is not open, triage nurses are available 24/7 to help you.     We would like you to know that our clinic " "has extended hours (provide information).  We also have urgent care (provide details on closest location and hours/contact info)\"      \"Thank you for your time and take care!\"  Seema Smith RN             "

## 2021-10-12 ENCOUNTER — TELEPHONE (OUTPATIENT)
Dept: FAMILY MEDICINE | Facility: CLINIC | Age: 41
End: 2021-10-12

## 2021-10-12 LAB
PATH REPORT.COMMENTS IMP SPEC: NORMAL
PATH REPORT.COMMENTS IMP SPEC: NORMAL
PATH REPORT.FINAL DX SPEC: NORMAL
PATH REPORT.GROSS SPEC: NORMAL
PATH REPORT.MICROSCOPIC SPEC OTHER STN: NORMAL
PATH REPORT.RELEVANT HX SPEC: NORMAL
PHOTO IMAGE: NORMAL

## 2021-10-12 PROCEDURE — 88304 TISSUE EXAM BY PATHOLOGIST: CPT | Mod: 26 | Performed by: PATHOLOGY

## 2021-10-12 PROCEDURE — 88311 DECALCIFY TISSUE: CPT | Mod: 26 | Performed by: PATHOLOGY

## 2021-10-12 NOTE — TELEPHONE ENCOUNTER
MTM referral from: [facility] visit (referral by provider)    MTM referral outreach attempt #1 on October 12, 2021 at 3:57 PM      Outcome: Patient declined, will route to MTM Pharmacist/Provider as an FYI. Thank you for the referral.     Mauricio Ledbetter, MTM coordinator

## 2021-10-13 ENCOUNTER — TELEPHONE (OUTPATIENT)
Dept: OTHER | Facility: CLINIC | Age: 41
End: 2021-10-13

## 2021-10-13 NOTE — TELEPHONE ENCOUNTER
[place] [facility]    I called Pool Ontiveros after his surgery for vasogenic left TOS syndrome.  He is doing very well and remove the drain 2 days ago.  On Xarelto 15 mg twice daily.    He had a very abnormal lateral portion of his first rib that was fused to the upper border of the second rib.  Pathology has revealed this is benign and I discussed this with him.    He is still awaiting call to schedule his routine left arm venogram in 10 to 14 days post procedure.  We will check on this.       Jovi Ellison MD

## 2021-10-14 ENCOUNTER — TELEPHONE (OUTPATIENT)
Dept: OTHER | Facility: CLINIC | Age: 41
End: 2021-10-14

## 2021-10-14 DIAGNOSIS — I82.629 ACUTE DEEP VEIN THROMBOSIS (DVT) OF OTHER VEIN OF UPPER EXTREMITY, UNSPECIFIED LATERALITY (H): Primary | ICD-10-CM

## 2021-10-14 RX ORDER — HEPARIN SODIUM 200 [USP'U]/100ML
1 INJECTION, SOLUTION INTRAVENOUS CONTINUOUS PRN
Status: CANCELLED | OUTPATIENT
Start: 2021-10-14

## 2021-10-14 NOTE — TELEPHONE ENCOUNTER
Contacted patient to schedule left upper extremity venogram.  Type of procedure: Left upper extremity venogram  Location of surgery: [facility]  Date / Check in time: 10/19: check in at 7:30 am  Radiologist / Surgeon: Dr. Zamarripa/ Scot SWEET will update pre-op  Packet sent out:  10/14    Medication Instructions: do not hold AC  COVID test with in 4 days of procedure  Need a   NPO for 4 hours, may take am meds with sip of water.    Chelsy Donohue RN  IR nurse clinician  [phone]

## 2021-10-18 ENCOUNTER — TELEPHONE (OUTPATIENT)
Dept: INTERVENTIONAL RADIOLOGY/VASCULAR | Facility: CLINIC | Age: 41
End: 2021-10-18

## 2021-10-18 NOTE — TELEPHONE ENCOUNTER
Pre-procedural telephone call completed.    Confirmed procedure date and time, arrival time, location, NPO status, and need for a designated  home.    COVID test ordered; not scheduled. COVID test scheduling phone number provided.

## 2021-10-19 ENCOUNTER — HOSPITAL ENCOUNTER (OUTPATIENT)
Facility: CLINIC | Age: 41
Discharge: HOME | End: 2021-10-19
Attending: RADIOLOGY | Admitting: RADIOLOGY
Payer: COMMERCIAL

## 2021-10-19 VITALS
SYSTOLIC BLOOD PRESSURE: 133 MMHG | BODY MASS INDEX: 28.09 KG/M2 | WEIGHT: 225.9 LBS | DIASTOLIC BLOOD PRESSURE: 88 MMHG | TEMPERATURE: 98 F | RESPIRATION RATE: 18 BRPM | HEIGHT: 75 IN

## 2021-10-19 DIAGNOSIS — I82.629 ACUTE DEEP VEIN THROMBOSIS (DVT) OF OTHER VEIN OF UPPER EXTREMITY, UNSPECIFIED LATERALITY (H): Primary | ICD-10-CM

## 2021-10-19 PROCEDURE — 999N000012 HC STATISTIC ANGIOGRAM, STENT, VERTEBRO PLASTY

## 2021-10-19 RX ORDER — SODIUM CHLORIDE 9 MG/ML
INJECTION, SOLUTION INTRAVENOUS CONTINUOUS
Status: DISCONTINUED | OUTPATIENT
Start: 2021-10-19 | End: 2021-10-19 | Stop reason: HOSPADM

## 2021-10-19 RX ORDER — LIDOCAINE 40 MG/G
CREAM TOPICAL
Status: DISCONTINUED | OUTPATIENT
Start: 2021-10-19 | End: 2021-10-19 | Stop reason: HOSPADM

## 2021-10-19 ASSESSMENT — MIFFLIN-ST. JEOR: SCORE: 2020.31

## 2021-10-20 ENCOUNTER — TELEPHONE (OUTPATIENT)
Dept: OTHER | Facility: CLINIC | Age: 41
End: 2021-10-20

## 2021-10-20 DIAGNOSIS — Z11.59 ENCOUNTER FOR SCREENING FOR OTHER VIRAL DISEASES: ICD-10-CM

## 2021-10-21 ENCOUNTER — LAB (OUTPATIENT)
Dept: URGENT CARE | Facility: URGENT CARE | Age: 41
End: 2021-10-21
Payer: COMMERCIAL

## 2021-10-21 DIAGNOSIS — Z11.59 ENCOUNTER FOR SCREENING FOR OTHER VIRAL DISEASES: ICD-10-CM

## 2021-10-21 PROCEDURE — U0003 INFECTIOUS AGENT DETECTION BY NUCLEIC ACID (DNA OR RNA); SEVERE ACUTE RESPIRATORY SYNDROME CORONAVIRUS 2 (SARS-COV-2) (CORONAVIRUS DISEASE [COVID-19]), AMPLIFIED PROBE TECHNIQUE, MAKING USE OF HIGH THROUGHPUT TECHNOLOGIES AS DESCRIBED BY CMS-2020-01-R: HCPCS

## 2021-10-21 PROCEDURE — U0005 INFEC AGEN DETEC AMPLI PROBE: HCPCS

## 2021-10-21 PROCEDURE — 99207 PR NO CHARGE LOS: CPT

## 2021-10-22 LAB — SARS-COV-2 RNA RESP QL NAA+PROBE: NEGATIVE

## 2021-10-25 ENCOUNTER — HOSPITAL ENCOUNTER (OUTPATIENT)
Facility: CLINIC | Age: 41
Discharge: HOME | End: 2021-10-25
Attending: RADIOLOGY | Admitting: RADIOLOGY
Payer: COMMERCIAL

## 2021-10-25 ENCOUNTER — APPOINTMENT (OUTPATIENT)
Dept: INTERVENTIONAL RADIOLOGY/VASCULAR | Facility: CLINIC | Age: 41
End: 2021-10-25
Attending: SURGERY
Payer: COMMERCIAL

## 2021-10-25 VITALS
RESPIRATION RATE: 16 BRPM | WEIGHT: 227.7 LBS | HEART RATE: 60 BPM | SYSTOLIC BLOOD PRESSURE: 105 MMHG | OXYGEN SATURATION: 99 % | HEIGHT: 75 IN | DIASTOLIC BLOOD PRESSURE: 49 MMHG | BODY MASS INDEX: 28.31 KG/M2 | TEMPERATURE: 97.6 F

## 2021-10-25 DIAGNOSIS — I82.629 ACUTE DEEP VEIN THROMBOSIS (DVT) OF OTHER VEIN OF UPPER EXTREMITY, UNSPECIFIED LATERALITY (H): ICD-10-CM

## 2021-10-25 PROCEDURE — 99152 MOD SED SAME PHYS/QHP 5/>YRS: CPT

## 2021-10-25 PROCEDURE — C1769 GUIDE WIRE: HCPCS

## 2021-10-25 PROCEDURE — 999N000163 HC STATISTIC SIMPLE TUBE INSERTION/CHARGE, PORT, CATH, FISTULOGRAM

## 2021-10-25 PROCEDURE — 272N000566 HC SHEATH CR3

## 2021-10-25 PROCEDURE — 250N000011 HC RX IP 250 OP 636: Performed by: NURSE PRACTITIONER

## 2021-10-25 PROCEDURE — 255N000002 HC RX 255 OP 636: Performed by: RADIOLOGY

## 2021-10-25 PROCEDURE — 37248 TRLUML BALO ANGIOP 1ST VEIN: CPT

## 2021-10-25 PROCEDURE — 250N000009 HC RX 250: Performed by: NURSE PRACTITIONER

## 2021-10-25 PROCEDURE — 250N000011 HC RX IP 250 OP 636: Performed by: PHYSICIAN ASSISTANT

## 2021-10-25 PROCEDURE — 272N000302 HC DEVICE INFLATION CR5

## 2021-10-25 PROCEDURE — C1725 CATH, TRANSLUMIN NON-LASER: HCPCS

## 2021-10-25 PROCEDURE — 272N000567 HC SHEATH CR4

## 2021-10-25 PROCEDURE — 272N000196 HC ACCESSORY CR5

## 2021-10-25 PROCEDURE — 75820 VEIN X-RAY ARM/LEG: CPT

## 2021-10-25 PROCEDURE — 258N000003 HC RX IP 258 OP 636: Performed by: PHYSICIAN ASSISTANT

## 2021-10-25 PROCEDURE — 272N000116 HC CATH CR1

## 2021-10-25 RX ORDER — NALOXONE HYDROCHLORIDE 0.4 MG/ML
0.4 INJECTION, SOLUTION INTRAMUSCULAR; INTRAVENOUS; SUBCUTANEOUS
Status: DISCONTINUED | OUTPATIENT
Start: 2021-10-25 | End: 2021-10-25 | Stop reason: HOSPADM

## 2021-10-25 RX ORDER — SODIUM CHLORIDE 9 MG/ML
INJECTION, SOLUTION INTRAVENOUS CONTINUOUS
Status: DISCONTINUED | OUTPATIENT
Start: 2021-10-25 | End: 2021-10-25 | Stop reason: HOSPADM

## 2021-10-25 RX ORDER — NALOXONE HYDROCHLORIDE 0.4 MG/ML
0.2 INJECTION, SOLUTION INTRAMUSCULAR; INTRAVENOUS; SUBCUTANEOUS
Status: DISCONTINUED | OUTPATIENT
Start: 2021-10-25 | End: 2021-10-25 | Stop reason: HOSPADM

## 2021-10-25 RX ORDER — FLUMAZENIL 0.1 MG/ML
0.2 INJECTION, SOLUTION INTRAVENOUS
Status: DISCONTINUED | OUTPATIENT
Start: 2021-10-25 | End: 2021-10-25 | Stop reason: HOSPADM

## 2021-10-25 RX ORDER — HEPARIN SODIUM 200 [USP'U]/100ML
1 INJECTION, SOLUTION INTRAVENOUS CONTINUOUS PRN
Status: DISCONTINUED | OUTPATIENT
Start: 2021-10-25 | End: 2021-10-25 | Stop reason: HOSPADM

## 2021-10-25 RX ORDER — LIDOCAINE 40 MG/G
CREAM TOPICAL
Status: DISCONTINUED | OUTPATIENT
Start: 2021-10-25 | End: 2021-10-25 | Stop reason: HOSPADM

## 2021-10-25 RX ORDER — FENTANYL CITRATE 50 UG/ML
25-50 INJECTION, SOLUTION INTRAMUSCULAR; INTRAVENOUS EVERY 5 MIN PRN
Status: DISCONTINUED | OUTPATIENT
Start: 2021-10-25 | End: 2021-10-25 | Stop reason: HOSPADM

## 2021-10-25 RX ORDER — IOPAMIDOL 612 MG/ML
100 INJECTION, SOLUTION INTRAVASCULAR ONCE
Status: COMPLETED | OUTPATIENT
Start: 2021-10-25 | End: 2021-10-25

## 2021-10-25 RX ADMIN — LIDOCAINE HYDROCHLORIDE 1 ML: 10 INJECTION, SOLUTION INFILTRATION; PERINEURAL at 13:33

## 2021-10-25 RX ADMIN — FENTANYL CITRATE 25 MCG: 50 INJECTION INTRAMUSCULAR; INTRAVENOUS at 13:51

## 2021-10-25 RX ADMIN — FENTANYL CITRATE 25 MCG: 50 INJECTION INTRAMUSCULAR; INTRAVENOUS at 13:43

## 2021-10-25 RX ADMIN — FENTANYL CITRATE 50 MCG: 50 INJECTION INTRAMUSCULAR; INTRAVENOUS at 13:24

## 2021-10-25 RX ADMIN — HEPARIN SODIUM 3 BAG: 200 INJECTION, SOLUTION INTRAVENOUS at 13:40

## 2021-10-25 RX ADMIN — MIDAZOLAM HYDROCHLORIDE 0.5 MG: 1 INJECTION, SOLUTION INTRAMUSCULAR; INTRAVENOUS at 13:43

## 2021-10-25 RX ADMIN — SODIUM CHLORIDE: 9 INJECTION, SOLUTION INTRAVENOUS at 12:35

## 2021-10-25 RX ADMIN — MIDAZOLAM HYDROCHLORIDE 1 MG: 1 INJECTION, SOLUTION INTRAMUSCULAR; INTRAVENOUS at 13:24

## 2021-10-25 RX ADMIN — IOPAMIDOL 30 ML: 612 INJECTION, SOLUTION INTRAVENOUS at 13:55

## 2021-10-25 RX ADMIN — MIDAZOLAM HYDROCHLORIDE 0.5 MG: 1 INJECTION, SOLUTION INTRAMUSCULAR; INTRAVENOUS at 13:51

## 2021-10-25 ASSESSMENT — MIFFLIN-ST. JEOR: SCORE: 2028.47

## 2021-10-25 NOTE — PRE-PROCEDURE
GENERAL PRE-PROCEDURE:   Procedure:  Left arm venogram with possible venoplasty, thrombectomy with intravenous moderate sedation   Date/Time:  10/25/2021 12:35 PM    Written consent obtained?: Yes    Risks and benefits: Risks, benefits and alternatives were discussed    Consent given by:  Patient  Patient states understanding of procedure being performed: Yes    Patient's understanding of procedure matches consent: Yes    Procedure consent matches procedure scheduled: Yes    Expected level of sedation:  Moderate  Appropriately NPO:  Yes  ASA Class:  2  Mallampati  :  Grade 2- soft palate, base of uvula, tonsillar pillars, and portion of posterior pharyngeal wall visible  Lungs:  Lungs clear with good breath sounds bilaterally  Heart:  Normal heart sounds and rate  History & Physical reviewed:  History and physical reviewed and no updates needed  Statement of review:  I have reviewed the lab findings, diagnostic data, medications, and the plan for sedation

## 2021-10-25 NOTE — PROGRESS NOTES
Care Suites Admission Nursing Note    Patient Information  Name: Pool Ontiveros  Age: 41 year old  Reason for admission: venogram  Care Suites arrival time: 1135    V  Patient Admission/Assessment   Pre-procedure assessment complete: Yes  If abnormal assessment/labs, provider notified: N/A  NPO: Yes  Medications held per instructions/orders: N/A  Consent: deferred  If applicable, pregnancy test status: deferred  Patient oriented to room: Yes  Education/questions answered: Yes  Plan/other: venogram    Discharge Planning  Discharge name/phone number: wife-jaswinder - [phone]  Overnight post sedation caregiver: above  Discharge location: home    Fabio Wynne RN

## 2021-10-25 NOTE — PROCEDURES
[facility]    Procedure: Left upper extremity venogram.     Date/Time: 10/25/2021 5:28 PM  Performed by: Annmarie Zamarripa DO  Authorized by: Annmarie Zamarripa DO     UNIVERSAL PROTOCOL   Site Marked: Yes  Prior Images Obtained and Reviewed:  Yes  Required items: Required blood products, implants, devices and special equipment available    Patient identity confirmed:  Verbally with patient, arm band, provided demographic data and hospital-assigned identification number  Patient was reevaluated immediately before administering moderate or deep sedation or anesthesia  Confirmation Checklist:  Patient's identity using two indicators, relevant allergies, procedure was appropriate and matched the consent or emergent situation and correct equipment/implants were available  Time out: Immediately prior to the procedure a time out was called    Universal Protocol: the Joint Commission Universal Protocol was followed    Preparation: Patient was prepped and draped in usual sterile fashion           ANESTHESIA    Anesthesia: Local infiltration  Local Anesthetic:  Lidocaine 1% without epinephrine      SEDATION    Patient Sedated: Yes    Sedation Type:  Moderate (conscious) sedation  Vital signs: Vital signs monitored during sedation    See dictated procedure note for full details.  Findings: Left upper extremity venogram with focal severe stenosis of the subclavian vein, improved post venoplasty.    Specimens: none    Complications: None    Condition: Stable    PROCEDURE   Patient Tolerance:  Patient tolerated the procedure well with no immediate complications    Length of time physician/provider present for 1:1 monitoring during sedation: 20

## 2021-10-25 NOTE — PROGRESS NOTES
Care Suites Post Procedure Note    Patient Information  Name: Pool Ontiveros  Age: 41 year old    Post Procedure  Time patient returned to Care Suites: 1418  Concerns/abnormal assessment: NA  If abnormal assessment, provider notified: N/A  Plan/Other: post procedure cares as ordered.    SELMA brachial site CDI/soft, some discomfort present per patient in left arm. VSS.     Florina Lloyd RN

## 2021-10-25 NOTE — PROGRESS NOTES
Care Suites Discharge Nursing Note    Patient Information  Name: Pool Ontiveros  Age: 41 year old    Discharge Education:  Discharge instructions reviewed: Yes  Additional education/resources provided: NA  Patient/patient representative verbalizes understanding: Yes  Patient discharging on new medications: No  Medication education completed: N/A    Discharge Plans:   Discharge location: home  Discharge ride contacted: Yes  Approximate discharge time: 1510    Discharge Criteria:  Discharge criteria met and vital signs stable: Yes. SELMA brachial site CDI/soft, VSS, tolerated fluids/voided, PIV pulled.    Patient Belongs:  Patient belongings returned to patient: Yes    Florina Lloyd RN

## 2021-10-25 NOTE — IR NOTE
Interventional Radiology Intra-procedural Nursing Note    Patient Name: Pool Ontiveros  Medical Record Number: [id number]  Today's Date: October 25, 2021    Start Time: 1331  End of procedure time: 1352  Procedure: Left arm venogram with possible venoplasty, thrombectomy with intravenous moderate sedation   Report given to: [facility] Suit[facility] RN  Time pt departs:  1404    Other Notes: Pt into IR suite 2 via cart. Pt awake and alert. To table in supine position. Monitoring equipment applied. VSS. Tele SB. Dr. Zamarripa in room. Time out and procedure started. Pt tolerated procedure well. Debrief with Dr. Zamarripa. Venous Sheath removed and pressure held until hemostasis achieved. Dressing CDI. No complications. Pt transferred back to Care Suites.    Medications:    Versed 2 mg  Fentanyl 100 mcg  Lidocaine 1% 1 ml    Chance Espinal RN

## 2021-10-25 NOTE — CONSULTS
VASCULAR SURGERY    Pool Ontiveros presents for left arm venogram by Dr. Annmarie Zamarripa of interventional radiology.  He had vasogenic left TOS with DVT of the left axillary subclavian vein.    Underwent venogram on 10/6/2021 infusion of lytic therapy which was successful but had evidence of ongoing extrinsic compression.    On 10/7/2021 he underwent a left transaxillary first rib resection scalenectomy.  Mobilization of the left subclavian vein was performed.  More difficult procedure due to his body habitus with weight 103.3 kg and the fact that he had a fused mid lateral first rib to the second rib making removal more difficult.  We were able to remove the majority of the rib almost to the transverse process of the spine posteriorly and manubrium medially.    He is done well following procedure.  Discharged on Xarelto 15 mg twice daily which is continued.  Drain was removed by the patient on 10/10/2021.    Resents for the venogram today.  His surgical site is healing well.  Normal CMS with minimal numbness from traction on the lower brachial plexus.  No recurrent arm swelling.    10/21/2021 COVID-19 testing is negative.  He has been vaccinated in the past.      Situation reviewed with the patient and Dr. Zamarripa who performed a venogram this afternoon.  Whether intervention is necessary will determine the length of his anticoagulation which usually is 3 months after the last intervention.  He still has a lot of 15 mg Xarelto left and we will keep him on 15 mg twice daily we finishes this prescription due to his body habitus and then go to 20 mg Xarelto at that time.       Jovi Ellison MD    ADDENDUM: Reviewed venogram with Dr. Zamarripa.  Left subclavian vein was occluded and there were large collaterals explaining his minimal swelling.  She was able to easily cannulate the subclavian vein.  Sequential angioplasty was performed up to a 10 mm balloon with good results and good flow.  Minimal  irregularity noted on the vein.  Patient obvious well.    He will be kept on the Xarelto 15 mg twice daily.  Repeat venogram in approximately 2 weeks with Dr. Zamarripa.         Jovi Ellison MD

## 2021-10-25 NOTE — DISCHARGE INSTRUCTIONS
Venogram Discharge Instructions - Brachial    After you go home:      Have an adult stay with you until tomorrow.    Drink extra fluids for 2 days.    You may resume your normal diet.    No smoking       For 24 hours - due to the sedation you received:    Relax and take it easy.    Do NOT make any important or legal decisions.    Do NOT drive or operate machines at home or at work.    Do NOT drink alcohol.    Care of Arm Puncture Site:      For the first 24 hrs - check the puncture site every 1-2 hours while awake.    Remove the bandaid after 24 hours. If there is minor oozing, apply another bandaid and remove it after 12 hours.    It is normal to have a small bruise or pea size lump at the site.    You may shower tomorrow.  Do NOT take a bath, or use a hot tub or pool for at least 3 days. Do NOT scrub the site. Do not use lotion or powder near the puncture site.     Activity:      For 2 days, do not use your hand or arm to support your weight (such as rising from a chair)     For 2 days, do not lift more than 5 pounds or exercise your arm (such as tennis, golf or bowling).    Bleeding:      If you start bleeding from the site in your arm, sit down and press firmly on the site for 10 minutes.     Once bleeding stops, keep your arm straight for 2 hours.     Call the [facility] as soon as you can.       Call 911 right away if you have heavy bleeding or bleeding that does not stop.      Medicines:      If you are on Metformin (Glucophage) and your GFR (kidney function level) is >30, you may continue taking your Metformin.    If you are on Metformin (Glucophage) and your GFR (kidney function level) is <30, do not restart the Metformin for 48 hours after your procedure. Check with your primary care giver before restarting the Metformin to see if you need to have blood drawn to recheck your kidney function (GFR).    If you are taking an antiplatelet medication such as Plavix, do not stop taking it until you  talk to your provider.       Take your medications, including blood thinners, unless your provider tells you not to.      If you take Coumadin (Warfarin), have your INR checked by your provider in  3-5 days. Call your clinic to schedule this.    If you have stopped any medicines, check with your provider about when to restart them.               Follow Up Appointments:      Follow up with [facility] as directed.    Call the clinic if:      You have increased pain or a large or growing hard lump around the site.    The site is red, swollen, hot or tender.    Blood or fluid is draining from the site.    You have chills or a fever greater than 101 F (38 C).    Your arm feels numb, cool or changes color.    You have hives, a rash or unusual itching.    Any questions or concerns.    Other Instructions:      If you received a stent - carry your stent card with you at all times.      If you have questions or your original symptoms do not improve, call:            [facility] @ [phone]

## 2021-10-26 ENCOUNTER — TELEPHONE (OUTPATIENT)
Dept: OTHER | Facility: CLINIC | Age: 41
End: 2021-10-26

## 2021-10-26 DIAGNOSIS — I82.629 ACUTE DEEP VEIN THROMBOSIS (DVT) OF OTHER VEIN OF UPPER EXTREMITY, UNSPECIFIED LATERALITY (H): Primary | ICD-10-CM

## 2021-10-26 RX ORDER — HEPARIN SODIUM 200 [USP'U]/100ML
1 INJECTION, SOLUTION INTRAVENOUS CONTINUOUS PRN
Status: CANCELLED | OUTPATIENT
Start: 2021-10-26

## 2021-10-26 NOTE — TELEPHONE ENCOUNTER
Patient returned phone call.  Doing well.  No issues with puncture site.  Patient scheduled for repeat left upper extremity venogram on 11/12.  Check in at noon due to work constraints.  NPO after 9:30 am, may take all am meds including AC with sip of water.  Needs a .  Patient aware COVID test needs to be done with in 4 days of the procedure.  He requests that they reach out to him soon to schedule. Order and request placed.   Patient has my number if questions.  Chelsy Donohue RN  IR nurse clinician  [phone]

## 2021-10-26 NOTE — TELEPHONE ENCOUNTER
Left VM, need to schedule repeat venogram in 2 weeks with Dr. Zamarripa.  Chelsy Donohue RN  IR nurse clinician  [phone]

## 2021-11-03 ENCOUNTER — OFFICE VISIT (OUTPATIENT)
Dept: OTHER | Facility: CLINIC | Age: 41
End: 2021-11-03
Attending: SURGERY
Payer: COMMERCIAL

## 2021-11-03 VITALS — SYSTOLIC BLOOD PRESSURE: 118 MMHG | DIASTOLIC BLOOD PRESSURE: 79 MMHG | HEART RATE: 74 BPM

## 2021-11-03 DIAGNOSIS — G54.0 TOS (THORACIC OUTLET SYNDROME): Primary | ICD-10-CM

## 2021-11-03 PROCEDURE — 99024 POSTOP FOLLOW-UP VISIT: CPT | Performed by: SURGERY

## 2021-11-03 RX ORDER — NALOXONE HYDROCHLORIDE 0.4 MG/ML
0.4 INJECTION, SOLUTION INTRAMUSCULAR; INTRAVENOUS; SUBCUTANEOUS
Status: CANCELLED | OUTPATIENT
Start: 2021-11-03

## 2021-11-03 RX ORDER — NALOXONE HYDROCHLORIDE 0.4 MG/ML
0.2 INJECTION, SOLUTION INTRAMUSCULAR; INTRAVENOUS; SUBCUTANEOUS
Status: CANCELLED | OUTPATIENT
Start: 2021-11-03

## 2021-11-03 RX ORDER — FENTANYL CITRATE 50 UG/ML
25-50 INJECTION, SOLUTION INTRAMUSCULAR; INTRAVENOUS EVERY 5 MIN PRN
Status: CANCELLED | OUTPATIENT
Start: 2021-11-03

## 2021-11-03 RX ORDER — FLUMAZENIL 0.1 MG/ML
0.2 INJECTION, SOLUTION INTRAVENOUS
Status: CANCELLED | OUTPATIENT
Start: 2021-11-03

## 2021-11-03 RX ORDER — HEPARIN SODIUM 200 [USP'U]/100ML
1 INJECTION, SOLUTION INTRAVENOUS CONTINUOUS PRN
Status: CANCELLED | OUTPATIENT
Start: 2021-11-03

## 2021-11-03 NOTE — PATIENT INSTRUCTIONS
Sutures are to remain in place for 10 days.  Dr. Ellison or hospital staff will remove these at your 11/12/21 follow up venogram.    We applied bacitracin and gauze to the site.  You can remove this after 24 hours.    If the sutures are irritating and rub on your shirt, you may place another band-aid over it.    It ok to shower.

## 2021-11-03 NOTE — PROGRESS NOTES
[facility]        Patient is here for a  follow up.     Pt is currently taking Statin and Xarelto.    /79 (BP Location: Right arm, Patient Position: Chair, Cuff Size: Adult Large)   Pulse 74     The provider has been notified that the patient has concerns of bleeding under arm pit.     Questions patient would like addressed today are: N/A.    Refills are needed: N/A    Has homecare services and agency name:  Neema Reed MA

## 2021-11-03 NOTE — PROGRESS NOTES
[place] [facility]    Pool Ontiveros called me earlier today stating that he noticed an irritated area on the medial aspect of his left axillary incision first TOS surgery.  He been keeping a bandage over the site.  His arm has been feeling good with no recurrent swelling.  He is on Xarelto 15 mg twice daily.    He then called back later this morning and knees noted some serosanguineous bleeding from the site and had saturated through his shirt.  Still a problem even with the gauze.  We requested that he come in.    Exam: Alert and appropriate.  Very comfortable.   Blood pressure 118/79 right arm.  Pulse 74.   Good range of motion left arm with no swelling.   Normal CMS.   Very irritated axilla skin around the incision.  Approximately 1 cm superficial dehiscence on the medial aspect where there is serosanguineous drainage.  No other area at the medial one third had a small pinhole opening.  No evidence of cellulitis 29    PROCEDURE: We felt that slight I&D was indicated along with suture closure.  Patient agreed.  Area prepped with ChloraPrep and sterile drapes applied.  Anesthetized with 10 mL of 1% lidocaine.  Opened up the medial aspect completely with a 15 blade scalpel for a length of 1.5 cm.  Underlying organized hematoma was noted but no deep pocket.  Also opened up the pinhole site 0.5 cm.  We then approximated the 2 skin edges with interrupted simple and mattress 4-0 nylon sutures.  Bacitracin ointment and gauze to the site.    IMPRESSION: 7 unusual medial breakdown of incision.  Suspect that he may have had a underlying seroma in the setting primarily serosanguineous fluid.  Area has been debrided and reapproximated with nylon sutures.  He may shower but will then place bacitracin over the area along with gauze and Medipore tape since his Band-Aids are significantly irritated the surrounding skin.    He is scheduled for repeat venogram in 10 days and I will see him at that time for suture  removal.       Jovi Ellison MD

## 2021-11-04 ENCOUNTER — OFFICE VISIT (OUTPATIENT)
Dept: OTHER | Facility: CLINIC | Age: 41
End: 2021-11-04
Attending: SURGERY
Payer: COMMERCIAL

## 2021-11-04 VITALS — DIASTOLIC BLOOD PRESSURE: 76 MMHG | SYSTOLIC BLOOD PRESSURE: 127 MMHG | HEART RATE: 77 BPM

## 2021-11-04 DIAGNOSIS — L03.313 CELLULITIS OF CHEST WALL: ICD-10-CM

## 2021-11-04 DIAGNOSIS — G54.0 TOS (THORACIC OUTLET SYNDROME): Primary | ICD-10-CM

## 2021-11-04 PROCEDURE — G0463 HOSPITAL OUTPT CLINIC VISIT: HCPCS

## 2021-11-04 PROCEDURE — 99024 POSTOP FOLLOW-UP VISIT: CPT | Performed by: SURGERY

## 2021-11-04 NOTE — PROGRESS NOTES
[place] [facility]    Pool Ontiveros called again today with bleeding noted between the areas in his left axilla where we had sutured the anterior midportion.  He held pressure of the area and it did not stop.  Is been sore since he was seen 2 days ago while he placed several sutures on the bleeding site with partial breakdown.  Is on Xarelto due to his TOS but certainly could explain some of the bleeding.    Exam: Comfortable.  Alert and appropriate.   Axilla has some slight fullness.  No cellulitis.   No incisional dehiscence with bruising between the 2 sutures areas    PROCEDURE: Informed consent obtained from patient.  Prepped with Betadine.    Anesthetized with 5 mL of 0.5% Marcaine.    I then placed a mattress and 2 simple 4-0 nylon sutures to stop the bleeding     Sterile dressing applied.      PLAN: Patient has a scheduled left arm venogram follow-up in 8 days.  I will see him at that time for suture removal.       Jovi Ellison MD

## 2021-11-04 NOTE — NURSING NOTE
[facility]        Patient is here for a follow up  to discuss post op concerns    Pt is currently taking Xarelto.    /76 (BP Location: Right arm, Patient Position: Semi-Rodgers's, Cuff Size: Adult Regular)   Pulse 77     The provider has been notified that the patient has concerns of post op bleeding.     Refills are needed: No    Has homecare services and agency name:  N/A       Belinda Zamudio RN

## 2021-11-05 ENCOUNTER — TELEPHONE (OUTPATIENT)
Dept: INTERVENTIONAL RADIOLOGY/VASCULAR | Facility: CLINIC | Age: 41
End: 2021-11-05

## 2021-11-05 RX ORDER — CEPHALEXIN 500 MG/1
500 CAPSULE ORAL 4 TIMES DAILY
Qty: 28 CAPSULE | Refills: 1 | Status: SHIPPED | OUTPATIENT
Start: 2021-11-05 | End: 2022-01-28

## 2021-11-05 NOTE — TELEPHONE ENCOUNTER
Pt called for pre-procedure/covid.  Pt has a covid test scheduled for 11/9/21.  Pt had a message left with all information and was given our unit number to call with any questions.

## 2021-11-09 ENCOUNTER — LAB (OUTPATIENT)
Dept: URGENT CARE | Facility: URGENT CARE | Age: 41
End: 2021-11-09
Payer: COMMERCIAL

## 2021-11-09 DIAGNOSIS — I82.629 ACUTE DEEP VEIN THROMBOSIS (DVT) OF OTHER VEIN OF UPPER EXTREMITY, UNSPECIFIED LATERALITY (H): ICD-10-CM

## 2021-11-09 PROCEDURE — U0005 INFEC AGEN DETEC AMPLI PROBE: HCPCS

## 2021-11-09 PROCEDURE — U0003 INFECTIOUS AGENT DETECTION BY NUCLEIC ACID (DNA OR RNA); SEVERE ACUTE RESPIRATORY SYNDROME CORONAVIRUS 2 (SARS-COV-2) (CORONAVIRUS DISEASE [COVID-19]), AMPLIFIED PROBE TECHNIQUE, MAKING USE OF HIGH THROUGHPUT TECHNOLOGIES AS DESCRIBED BY CMS-2020-01-R: HCPCS

## 2021-11-10 LAB — SARS-COV-2 RNA RESP QL NAA+PROBE: NEGATIVE

## 2021-11-11 NOTE — PROGRESS NOTES
"  Interventional Radiology - Pre-Procedure Note:  11/11/2021    Procedure Requested: LUE venogram  Requested by: Dr Ellison    History and Physical Reviewed: H&P documented within 30 days (by Dr Ellison on 11/3/21).  I have personally reviewed the patient's medical history and have updated the medical record as necessary.    Brief HPI: Pool Ontiveros is a 41 year old male with TOS s/p venogram with venoplasty of the subclavian vein 10/25/21. Previously had undergone first rib resection on 10/7/21 after venogram with lytic therapy on 10/6/21. Since prior venogram he has had drainage from his surgical incision. Followed closely by vascular surgery. No other complaints.     IMAGING:  LUE venogram 10/25/21:  \"IMPRESSION: Stenosis of the left subclavian vein, improved status post  venoplasty.     PLAN: Patient will return for repeat venogram in 2 weeks.\"    NPO: YES since [place]  ANTICOAGULANTS: Xarelto 15mg PO BID  ANTIBIOTICS: NONE    ALLERGIES  Allergies   Allergen Reactions     Vicodin [Hydrocodone-Acetaminophen] Hives         LABS:  INR   Date Value Ref Range Status   10/05/2021 1.03 0.85 - 1.15 Final     Comment:     Effective 7/11/2021, the reference range for this assay has changed.      Hemoglobin   Date Value Ref Range Status   10/08/2021 12.3 (L) 13.3 - 17.7 g/dL Final   05/03/2019 15.1 13.3 - 17.7 g/dL Final   ]  Platelet Count   Date Value Ref Range Status   10/08/2021 163 150 - 450 10e3/uL Final   05/03/2019 170 150 - 450 10e9/L Final     Creatinine   Date Value Ref Range Status   10/08/2021 1.00 0.66 - 1.25 mg/dL Final   05/03/2019 1.09 0.66 - 1.25 mg/dL Final     Potassium   Date Value Ref Range Status   10/06/2021 4.0 3.4 - 5.3 mmol/L Final     Comment:     Specimen slightly hemolyzed, potassium may be falsely elevated.   05/03/2019 3.8 3.4 - 5.3 mmol/L Final         EXAM:  There were no vitals taken for this visit.  General:  Stable.  In no acute distress.    Neuro:  A&O x 3. Moves all extremities " equally.  Heart: RRR  Lungs: No increased work of breathing  Ext: No swelling or edema LUE    Pre-Sedation Code Status Assessment:  Code Status: Full Code intra procedure, per discussion with patient.         ASSESSMENT/PLAN:   TOS s/p first rib resection, h/o LUE DVT    Procedure, risks/benefits, details, alternatives, and sedation reviewed with patient and patient verbalized understanding. All questions answered. OK to proceed with above radiology procedure.     Manolo Payton PA-C  Interventional Radiology  [phone]      Total time spent on the date of the encounter is 30 minutes, including time spent counseling the patient, performing a medically appropriate evaluation, reviewing prior medical history, ordering medications and tests, documenting clinical information in the medical record, and communication of results.

## 2021-11-12 ENCOUNTER — HOSPITAL ENCOUNTER (OUTPATIENT)
Dept: INTERVENTIONAL RADIOLOGY/VASCULAR | Facility: CLINIC | Age: 41
End: 2021-11-12
Attending: SURGERY | Admitting: RADIOLOGY
Payer: COMMERCIAL

## 2021-11-12 ENCOUNTER — HOSPITAL ENCOUNTER (OUTPATIENT)
Facility: CLINIC | Age: 41
Discharge: HOME | End: 2021-11-12
Attending: RADIOLOGY | Admitting: RADIOLOGY
Payer: COMMERCIAL

## 2021-11-12 VITALS
TEMPERATURE: 97 F | BODY MASS INDEX: 28.6 KG/M2 | HEIGHT: 75 IN | WEIGHT: 230 LBS | DIASTOLIC BLOOD PRESSURE: 69 MMHG | RESPIRATION RATE: 16 BRPM | SYSTOLIC BLOOD PRESSURE: 123 MMHG | OXYGEN SATURATION: 98 %

## 2021-11-12 VITALS
RESPIRATION RATE: 11 BRPM | HEART RATE: 75 BPM | DIASTOLIC BLOOD PRESSURE: 89 MMHG | OXYGEN SATURATION: 97 % | SYSTOLIC BLOOD PRESSURE: 117 MMHG

## 2021-11-12 DIAGNOSIS — I82.629 ACUTE DEEP VEIN THROMBOSIS (DVT) OF OTHER VEIN OF UPPER EXTREMITY, UNSPECIFIED LATERALITY (H): ICD-10-CM

## 2021-11-12 DIAGNOSIS — I82.629 ACUTE DEEP VEIN THROMBOSIS (DVT) OF OTHER VEIN OF UPPER EXTREMITY, UNSPECIFIED LATERALITY (H): Primary | ICD-10-CM

## 2021-11-12 PROCEDURE — 250N000011 HC RX IP 250 OP 636: Performed by: PHYSICIAN ASSISTANT

## 2021-11-12 PROCEDURE — 272N000302 HC DEVICE INFLATION CR5

## 2021-11-12 PROCEDURE — 999N000012 HC STATISTIC ANGIOGRAM, STENT, VERTEBRO PLASTY

## 2021-11-12 PROCEDURE — 99152 MOD SED SAME PHYS/QHP 5/>YRS: CPT

## 2021-11-12 PROCEDURE — 255N000002 HC RX 255 OP 636: Performed by: RADIOLOGY

## 2021-11-12 PROCEDURE — 36005 INJECTION EXT VENOGRAPHY: CPT

## 2021-11-12 PROCEDURE — 75820 VEIN X-RAY ARM/LEG: CPT

## 2021-11-12 PROCEDURE — 258N000003 HC RX IP 258 OP 636: Performed by: RADIOLOGY

## 2021-11-12 PROCEDURE — 250N000011 HC RX IP 250 OP 636: Performed by: RADIOLOGY

## 2021-11-12 PROCEDURE — 272N000196 HC ACCESSORY CR5

## 2021-11-12 PROCEDURE — C1769 GUIDE WIRE: HCPCS

## 2021-11-12 PROCEDURE — 76937 US GUIDE VASCULAR ACCESS: CPT

## 2021-11-12 PROCEDURE — 250N000009 HC RX 250: Performed by: PHYSICIAN ASSISTANT

## 2021-11-12 PROCEDURE — 272N000567 HC SHEATH CR4

## 2021-11-12 RX ORDER — NALOXONE HYDROCHLORIDE 0.4 MG/ML
0.4 INJECTION, SOLUTION INTRAMUSCULAR; INTRAVENOUS; SUBCUTANEOUS
Status: DISCONTINUED | OUTPATIENT
Start: 2021-11-12 | End: 2021-11-13 | Stop reason: HOSPADM

## 2021-11-12 RX ORDER — NALOXONE HYDROCHLORIDE 0.4 MG/ML
0.2 INJECTION, SOLUTION INTRAMUSCULAR; INTRAVENOUS; SUBCUTANEOUS
Status: DISCONTINUED | OUTPATIENT
Start: 2021-11-12 | End: 2021-11-13 | Stop reason: HOSPADM

## 2021-11-12 RX ORDER — HEPARIN SODIUM 200 [USP'U]/100ML
1 INJECTION, SOLUTION INTRAVENOUS CONTINUOUS PRN
Status: DISCONTINUED | OUTPATIENT
Start: 2021-11-12 | End: 2021-11-13 | Stop reason: HOSPADM

## 2021-11-12 RX ORDER — FLUMAZENIL 0.1 MG/ML
0.2 INJECTION, SOLUTION INTRAVENOUS
Status: DISCONTINUED | OUTPATIENT
Start: 2021-11-12 | End: 2021-11-13 | Stop reason: HOSPADM

## 2021-11-12 RX ORDER — FENTANYL CITRATE 50 UG/ML
25-50 INJECTION, SOLUTION INTRAMUSCULAR; INTRAVENOUS EVERY 5 MIN PRN
Status: DISCONTINUED | OUTPATIENT
Start: 2021-11-12 | End: 2021-11-13 | Stop reason: HOSPADM

## 2021-11-12 RX ORDER — IOPAMIDOL 612 MG/ML
100 INJECTION, SOLUTION INTRAVASCULAR ONCE
Status: COMPLETED | OUTPATIENT
Start: 2021-11-12 | End: 2021-11-12

## 2021-11-12 RX ORDER — SODIUM CHLORIDE 9 MG/ML
INJECTION, SOLUTION INTRAVENOUS CONTINUOUS
Status: DISCONTINUED | OUTPATIENT
Start: 2021-11-12 | End: 2021-11-12 | Stop reason: HOSPADM

## 2021-11-12 RX ORDER — LIDOCAINE 40 MG/G
CREAM TOPICAL
Status: DISCONTINUED | OUTPATIENT
Start: 2021-11-12 | End: 2021-11-12 | Stop reason: HOSPADM

## 2021-11-12 RX ADMIN — FENTANYL CITRATE 50 MCG: 50 INJECTION, SOLUTION INTRAMUSCULAR; INTRAVENOUS at 13:54

## 2021-11-12 RX ADMIN — LIDOCAINE HYDROCHLORIDE 2 ML: 10 INJECTION, SOLUTION INFILTRATION; PERINEURAL at 14:08

## 2021-11-12 RX ADMIN — HEPARIN SODIUM 1 BAG: 200 INJECTION, SOLUTION INTRAVENOUS at 13:54

## 2021-11-12 RX ADMIN — MIDAZOLAM HYDROCHLORIDE 1 MG: 1 INJECTION, SOLUTION INTRAMUSCULAR; INTRAVENOUS at 13:53

## 2021-11-12 RX ADMIN — MIDAZOLAM HYDROCHLORIDE 1 MG: 1 INJECTION, SOLUTION INTRAMUSCULAR; INTRAVENOUS at 14:01

## 2021-11-12 RX ADMIN — SODIUM CHLORIDE: 9 INJECTION, SOLUTION INTRAVENOUS at 12:45

## 2021-11-12 RX ADMIN — FENTANYL CITRATE 50 MCG: 50 INJECTION, SOLUTION INTRAMUSCULAR; INTRAVENOUS at 14:02

## 2021-11-12 RX ADMIN — IOPAMIDOL 10 ML: 612 INJECTION, SOLUTION INTRAVENOUS at 14:09

## 2021-11-12 ASSESSMENT — MIFFLIN-ST. JEOR: SCORE: 2038.9

## 2021-11-12 NOTE — PROGRESS NOTES
Care Suites Post Procedure Note    Patient Information  Name: Pool Ontiveros  Age: 41 year old    Post Procedure  Time patient returned to Care Suites: 1425  Concerns/abnormal assessment: none  If abnormal assessment, provider notified: N/A  Plan/Other: discharge at 1500, followup next week for suture removal and ultrasound    Faustina Hooks RN

## 2021-11-12 NOTE — PROGRESS NOTES
Care Suites Discharge Nursing Note    Patient Information  Name: Pool Ontiveros  Age: 41 year old    Discharge Education:  Discharge instructions reviewed: Yes  Additional education/resources provided: no  Patient/patient representative verbalizes understanding: Yes  Patient discharging on new medications: No  Medication education completed: N/A    Discharge Plans:   Discharge location: home  Discharge ride contacted: Yes  Approximate discharge time: 1505    Discharge Criteria:  Discharge criteria met and vital signs stable: Yes    Patient Belongs:  Patient belongings returned to patient: Yes    Faustina Hooks RN

## 2021-11-12 NOTE — IR NOTE
Interventional Radiology Intra-procedural Nursing Note    Patient Name: Pool Ontiveros  Medical Record Number: [id number]  Today's Date: November 12, 2021    Start Time: 1400  End of procedure time: 1406  Procedure: left upper extremity venogram  Report given to: JOAO Weems  Time pt departs:  1415  : n/a    Other Notes:   Patient arrives to Ir [address]. Identification confirmed and consent verified. Patient was then assisted onto procedure table, positioned safely and connected to monitoring equipment. Left upper extremity was prepped and patient draped under sterile technique.     Versed 2 mg IV  Fentanyl 100 mcg IV    Patient tolerated procedure well. VSS. Patient alert, respirations regular and unlabored, no c/o pain at this time.     Procedure access site left upper am is c/d/i and covered with gauze and tegaderm.    Patient transferred back to care suites in stable condition accompanied by myself.    Silvina Lincoln RN on 11/12/2021 at 2:35 PM

## 2021-11-12 NOTE — PROCEDURES
[facility]    Procedure: LUE venogram    Date/Time: 11/12/2021 5:36 PM  Performed by: Annmarie Zamarripa DO  Authorized by: Annmarie Zamarripa DO     UNIVERSAL PROTOCOL   Site Marked: NA  Prior Images Obtained and Reviewed:  Yes  Required items: Required blood products, implants, devices and special equipment available    Patient identity confirmed:  Verbally with patient, arm band, provided demographic data and hospital-assigned identification number  Patient was reevaluated immediately before administering moderate or deep sedation or anesthesia  Confirmation Checklist:  Patient's identity using two indicators, relevant allergies, procedure was appropriate and matched the consent or emergent situation and correct equipment/implants were available  Time out: Immediately prior to the procedure a time out was called    Universal Protocol: the Joint Commission Universal Protocol was followed    Preparation: Patient was prepped and draped in usual sterile fashion           ANESTHESIA    Anesthesia: Local infiltration  Local Anesthetic:  Lidocaine 1% without epinephrine      SEDATION    Patient Sedated: Yes    Sedation Type:  Moderate (conscious) sedation  Vital signs: Vital signs monitored during sedation    See dictated procedure note for full details.  Findings: LUE vengoram shows the brachial vein, axillary vein, subclavian vein, brachiocaphalic vein and superior vena cava are patent without evidence of stenosis.     Specimens: none    Complications: None    Condition: Stable    PROCEDURE   Patient Tolerance:  Patient tolerated the procedure well with no immediate complications    Length of time physician/provider present for 1:1 monitoring during sedation: 10

## 2021-11-12 NOTE — PROGRESS NOTES
PATIENT/VISITOR WELLNESS SCREENING    Step 1 Patient Screening    1. In the last month, have you been in contact with someone who was confirmed or suspected to have Coronavirus/COVID-19? No    2. Do you have the following symptoms?  Fever/Chills? No   Cough? No   Shortness of breath? No   New loss of taste or smell? No  Sore throat? No  Muscle or body aches? No  Headaches? No  Fatigue? No  Vomiting or diarrhea? No    Step 2 Visitor Screening    NA    Step 3 Refer to logic grid below for actions    NO SYMPTOM(S)    ACTIONS:  1. Standard rooming process  2. Provider to assess per normal protocol  3. Implement precautions as needed and per guidelines     POSITIVE SYMPTOM(S)  If positive for ANY of the following symptoms: fever, cough, shortness of breath, rash    ACTION:  1. Continue to have the patient wear a mask   2. Room patient as soon as possible  3. Don appropriate PPE when entering room  4. Provider evaluation

## 2021-11-12 NOTE — PROGRESS NOTES
Care Suites Admission Nursing Note    Patient Information  Name: Pool Ontiveros  Age: 41 year old  Reason for admission: venogram  Care Suites arrival time: 1200    Visitor Information  Name: NA    Patient Admission/Assessment   Pre-procedure assessment complete: Yes  If abnormal assessment/labs, provider notified: N/A  NPO: Yes  Medications held per instructions/orders: N/A  Consent: obtained  If applicable, pregnancy test status: deferred  Patient oriented to room: Yes  Education/questions answered: Yes  Plan/other: scheduled for 1330    Discharge Planning  Discharge name/phone number: ramana [phone]  Overnight post sedation caregiver: [phone]  Discharge location: [place]    Faustina Hooks RN

## 2021-11-12 NOTE — DISCHARGE INSTRUCTIONS
Venogram Discharge Instructions - Brachial    After you go home:      Have an adult stay with you until tomorrow.    Drink extra fluids for 2 days.    You may resume your normal diet.    No smoking       For 24 hours - due to the sedation you received:    Relax and take it easy.    Do NOT make any important or legal decisions.    Do NOT drive or operate machines at home or at work.    Do NOT drink alcohol.    Care of Arm Puncture Site:      For the first 24 hrs - check the puncture site every 1-2 hours while awake.    Remove the bandaid after 24 hours. If there is minor oozing, apply another bandaid and remove it after 12 hours.    It is normal to have a small bruise or pea size lump at the site.    You may shower tomorrow.  Do NOT take a bath, or use a hot tub or pool for at least 3 days. Do NOT scrub the site. Do not use lotion or powder near the puncture site.     Activity:      For 2 days, do not use your hand or arm to support your weight (such as rising from a chair)     For 2 days, do not lift more than 5 pounds or exercise your arm (such as tennis, golf or bowling).    Bleeding:      If you start bleeding from the site in your arm, sit down and press firmly on the site for 10 minutes.     Once bleeding stops, keep your arm straight for 2 hours.     Call the [facility] as soon as you can.       Call 911 right away if you have heavy bleeding or bleeding that does not stop.      Medicines:      If you are on Metformin (Glucophage) and your GFR (kidney function level) is >30, you may continue taking your Metformin.    If you are on Metformin (Glucophage) and your GFR (kidney function level) is <30, do not restart the Metformin for 48 hours after your procedure. Check with your primary care giver before restarting the Metformin to see if you need to have blood drawn to recheck your kidney function (GFR).    If you are taking an antiplatelet medication such as Plavix, do not stop taking it until you  talk to your provider.       Take your medications, including blood thinners, unless your provider tells you not to.      If you take Coumadin (Warfarin), have your INR checked by your provider in  3-5 days. Call your clinic to schedule this.    If you have stopped any medicines, check with your provider about when to restart them.               Follow Up Appointments:      Follow up with [facility] as directed.    Call the clinic if:      You have increased pain or a large or growing hard lump around the site.    The site is red, swollen, hot or tender.    Blood or fluid is draining from the site.    You have chills or a fever greater than 101 F (38 C).    Your arm feels numb, cool or changes color.    You have hives, a rash or unusual itching.    Any questions or concerns.    Other Instructions:      If you received a stent - carry your stent card with you at all times.      If you have questions or your original symptoms do not improve, call:            [facility] @ [phone]

## 2021-11-12 NOTE — PRE-PROCEDURE
GENERAL PRE-PROCEDURE:   Procedure:  LUE venogram with possible venoplasty    Written consent obtained?: Yes    Risks and benefits: Risks, benefits and alternatives were discussed    Consent given by:  Patient  Patient states understanding of procedure being performed: Yes    Patient's understanding of procedure matches consent: Yes    Procedure consent matches procedure scheduled: Yes    Expected level of sedation:  Moderate  Appropriately NPO:  Yes  ASA Class:  2  Mallampati  :  Grade 2- soft palate, base of uvula, tonsillar pillars, and portion of posterior pharyngeal wall visible  Lungs:  Lungs clear with good breath sounds bilaterally  Heart:  Normal heart sounds and rate  History & Physical reviewed:  History and physical reviewed and no updates needed  Statement of review:  I have reviewed the lab findings, diagnostic data, medications, and the plan for sedation

## 2021-11-15 ENCOUNTER — TELEPHONE (OUTPATIENT)
Dept: OTHER | Facility: CLINIC | Age: 41
End: 2021-11-15
Payer: COMMERCIAL

## 2021-11-15 DIAGNOSIS — I82.629 ACUTE DEEP VEIN THROMBOSIS (DVT) OF OTHER VEIN OF UPPER EXTREMITY, UNSPECIFIED LATERALITY (H): ICD-10-CM

## 2021-11-15 DIAGNOSIS — G54.0 TOS (THORACIC OUTLET SYNDROME): Primary | ICD-10-CM

## 2021-11-15 NOTE — TELEPHONE ENCOUNTER
[place] [facility]    I called Pool Ontiveros but his venogram performed on 11/12/2021 performed by Dr. Zamarripa.  He had history of left subclavian/axillary DVT that has been treated with interventional radiology and surgical decompression of the thoracic outlet.  He has remained on Xarelto 15 mg twice daily    I was in surgery and could not speak with him following the venogram.  However this reveals a widely patent axillary subclavian vein.  With his arm at 90 degrees during the procedure there is slight compression from the humeral head on the axillary vein of uncertain significance but still very good luminal diameter and rapid flow.  With the main veins being patent we hardly saw any collateral venous flow today which is the expected finding.    I had seen him the week before due to recurrent bleeding from his axillary incision line we placed several nylon sutures.  He also developed a cellulitis was placed on a course of oral antibiotics.  He reports that the erythema has completely resolved he has no discomfort.  Suture line is also healing well.      He may have the sutures removed.  His neighbor is a nurse and will do this for him otherwise he can come see me in the office.      PLAN: Widely patent left axillary/subclavian vein.  Continue on Xarelto for 3 additional months at which time we will perform a venous duplex in the office.  We will also evaluate the right arm at that time which is his dominant arm to make sure there is no evidence of compression of the right subclavian/axillary veins that could lead to a DVT in the dominant right arm.  He had a fair number of Xarelto 15 mg tablets.  When he is finished the 50 mg tablets he will continue on Xarelto 20 mg daily till follow-up.       Jovi Ellison MD

## 2021-11-17 ENCOUNTER — OFFICE VISIT (OUTPATIENT)
Dept: OTHER | Facility: CLINIC | Age: 41
End: 2021-11-17
Attending: PHYSICIAN ASSISTANT
Payer: COMMERCIAL

## 2021-11-17 VITALS
RESPIRATION RATE: 16 BRPM | DIASTOLIC BLOOD PRESSURE: 82 MMHG | BODY MASS INDEX: 28.23 KG/M2 | OXYGEN SATURATION: 98 % | HEIGHT: 75 IN | SYSTOLIC BLOOD PRESSURE: 121 MMHG | WEIGHT: 227 LBS | HEART RATE: 80 BPM

## 2021-11-17 DIAGNOSIS — G54.0 TOS (THORACIC OUTLET SYNDROME): Primary | ICD-10-CM

## 2021-11-17 DIAGNOSIS — I82.629 ACUTE DEEP VEIN THROMBOSIS (DVT) OF OTHER VEIN OF UPPER EXTREMITY, UNSPECIFIED LATERALITY (H): ICD-10-CM

## 2021-11-17 PROCEDURE — G0463 HOSPITAL OUTPT CLINIC VISIT: HCPCS

## 2021-11-17 PROCEDURE — 99214 OFFICE O/P EST MOD 30 MIN: CPT | Performed by: PHYSICIAN ASSISTANT

## 2021-11-17 ASSESSMENT — MIFFLIN-ST. JEOR: SCORE: 2025.3

## 2021-11-17 NOTE — PROGRESS NOTES
"[facility]        Patient is here for a follow up  to discuss about suture removal      /82 (BP Location: Left arm, Patient Position: Chair, Cuff Size: Adult Large)   Pulse 80   Resp 16   Ht 6' 3\" (1.905 m)   Wt 227 lb (103 kg)   SpO2 98%   BMI 28.37 kg/m      The provider has been notified that the patient has no concerns.     Questions patient would like addressed today are: N/A.    Refills are needed: No    Has homecare services and agency name:  Neema Reagan [facility]      "

## 2021-11-17 NOTE — PROGRESS NOTES
[facility] CENTER  VASCULAR MEDICINE FOLLOW-UP VISIT      PRIMARY HEALTH CARE PROVIDER:  Paco Bonilla    REASON FOR VISIT:  Follow up TOS and suture removal      HPI: Pool Ontiveros is a very pleasant 41 year old male with a history of hyperlipidemia and well managed bipolar disorder with sertraline.  For most of his adult life he has lifted weights.  He, however, take a 6 months break from lifting, but then had started back up.  When he started back up he felt his left arm became tight. He presented to the ED where he was noted to to have an occlusive left subclavian and axillary thrombosis as well as superficial left cephalic vein thrombosis.  He was started on IV heparin and taken to the Interventional Radiology suite for catheter-directed lysis and mechanical thrombectomy. Exam was significant for thoracic outlet syndrome and Dr. Ellison took him to the OR for left first rib resection and scalenectomy on 10/7/21. He was sent home on Xarelto 15 mg BID.     He returned for a venogram on 10/25/21 where the left subclavian vein was occluded and there were large collaterals explaining his minimal swelling.  Dr. Zamarripa was able to easily cannulate the subclavian vein.  Sequential angioplasty was performed up to a 10 mm balloon with good results and good flow.  Minimal irregularity noted on the vein.     On 11/3/21 he called with significant drainage from incision site. He came in and was noted to have unusual medial breakdown of incision.  It was suspected that he may have had an underlying seroma in the setting of primarily serosanguineous fluid. Area was debrided and reapproximated with nylon sutures.    Repeat venogram on 11/12/21 with patent veins.     He now presents for removal of his sutures. He is feeling well with the exception of irritation where sutures are and some minimal serous drainage.           PAST MEDICAL HISTORY  Past Medical History:   Diagnosis Date     Depression with anxiety  7/19/2016     Hernia, abdominal      History of atrial septal defect repair 3/5/2012    Overview:  repaired age 5      History of inguinal hernia repair 3/5/2012    Overview:  Age 6      Mixed hyperlipidemia 7/19/2016       PAST SURGICAL HISTORY:  Past Surgical History:   Procedure Laterality Date     HERNIA REPAIR       IR ANGIOGRAM THROUGH CATHETER FOLLOW UP  10/6/2021     IR UPPER EXTREMITY VENOGRAM LEFT  10/5/2021     IR UPPER EXTREMITY VENOGRAM LEFT  10/25/2021     IR UPPER EXTREMITY VENOGRAM LEFT  11/12/2021     TRANSAXILLARY RESECT FIRST RIB Left 10/7/2021    Procedure: LEFT TRANSAXILLARY FIRST RIB RESECTION AND SCALENECTOMY.;  Surgeon: Jovi Ellison;  Location: [facility] OR         CURRENT MEDICATIONS  acetaminophen (TYLENOL) 325 MG tablet, Take 2 tablets (650 mg) by mouth every 6 hours as needed for mild pain or other (and adjunct with moderate or severe pain or per patient request)  fenofibrate (TRIGLIDE/LOFIBRA) 160 MG tablet, Take 1 tablet (160 mg) by mouth daily  rivaroxaban ANTICOAGULANT (XARELTO ANTICOAGULANT) 15 MG TABS tablet, Take 1 tablet (15 mg) by mouth 2 times daily (with meals)  rivaroxaban ANTICOAGULANT (XARELTO ANTICOAGULANT) 15 MG TABS tablet, Take 1 tablet (15 mg) by mouth 2 times daily (with meals)  rosuvastatin (CRESTOR) 40 MG tablet, Take 1 tablet (40 mg) by mouth daily  sertraline (ZOLOFT) 100 MG tablet, TAKE 1 TABLET BY MOUTH EVERY DAY  cephALEXin (KEFLEX) 500 MG capsule, Take 1 capsule (500 mg) by mouth 4 times daily (Patient not taking: Reported on 11/17/2021)  senna-docusate (SENOKOT-S/PERICOLACE) 8.6-50 MG tablet, Take 1 tablet by mouth 2 times daily as needed for constipation (Patient not taking: Reported on 11/17/2021)    No current facility-administered medications on file prior to visit.      ALLERGIES     Allergies   Allergen Reactions     Vicodin [Hydrocodone-Acetaminophen] Hives       FAMILY HISTORY  Family History   Problem Relation Age of Onset     Bipolar Disorder  "Mother      Depression Mother        SOCIAL HISTORY  Social History     Socioeconomic History     Marital status:      Spouse name: Not on file     Number of children: Not on file     Years of education: Not on file     Highest education level: Not on file   Occupational History     Not on file   Tobacco Use     Smoking status: Never Smoker     Smokeless tobacco: Former User     Types: Chew     Tobacco comment: Gum, ocasionally    Substance and Sexual Activity     Alcohol use: Yes     Comment: 10-15 drinks per week     Drug use: No     Sexual activity: Yes     Partners: Female   Other Topics Concern     Parent/sibling w/ CABG, MI or angioplasty before 65F 55M? Not Asked   Social History Narrative     Not on file     Social Determinants of Health     Financial Resource Strain: Not on file   Food Insecurity: Not on file   Transportation Needs: Not on file   Physical Activity: Not on file   Stress: Not on file   Social Connections: Not on file   Intimate Partner Violence: Not on file   Housing Stability: Not on file       ROS:   Complete ROS negative other than what is stated in HPI.     EXAM:  /82 (BP Location: Left arm, Patient Position: Chair, Cuff Size: Adult Large)   Pulse 80   Resp 16   Ht 1.905 m (6' 3\")   Wt 103 kg (227 lb)   SpO2 98%   BMI 28.37 kg/m    In general, the patient is a pleasant male in no apparent distress.    HEENT: NC/AT.  PERRLA.  EOMI.  Sclerae white, not injected.    Heart: RRR. Normal S1, S2 splits physiologically. No murmur, rub, click, or gallop.   Lungs: CTA.  No ronchi, wheezes, rales.    Abdomen: Soft, nontender, nondistended.   Extremities: No clubbing, cyanosis, or edema.   Skin: Left axillary incision site with no signs of infection. Sutures were removed. No drainage noted. Mildly tender.     Labs:  LIPID RESULTS:  Lab Results   Component Value Date    CHOL 274 (H) 07/28/2021    CHOL 196 05/03/2019    HDL 50 07/28/2021    HDL 49 05/03/2019    LDL  07/28/2021      " Comment:      Cannot estimate LDL when triglyceride exceeds 400 mg/dL  Age 0-19 years:  Desirable: 0-110 mg/dL   Borderline high: 110-129 mg/dL   High: >= 130 mg/dL    Age 20 years and older:  Desirable: <100mg/dL  Above desirable: 100-129 mg/dL   Borderline high: 130-159 mg/dL   High: 160-189 mg/dL   Very high: >= 190 mg/dL     (H) 07/28/2021     (H) 05/03/2019    TRIG 521 (H) 07/28/2021    TRIG 235 (H) 05/03/2019       LIVER ENZYME RESULTS:  Lab Results   Component Value Date    AST 26 10/05/2021    AST 22 07/19/2016    ALT 31 10/05/2021    ALT 26 05/03/2019       CBC RESULTS:  Lab Results   Component Value Date    WBC 8.4 10/08/2021    WBC 7.6 05/03/2019    RBC 3.94 (L) 10/08/2021    RBC 4.86 05/03/2019    HGB 12.3 (L) 10/08/2021    HGB 15.1 05/03/2019    HCT 36.1 (L) 10/08/2021    HCT 43.1 05/03/2019    MCV 92 10/08/2021    MCV 89 05/03/2019    MCH 31.2 10/08/2021    MCH 31.1 05/03/2019    MCHC 34.1 10/08/2021    MCHC 35.0 05/03/2019    RDW 12.7 10/08/2021    RDW 13.2 05/03/2019     10/08/2021     05/03/2019       BMP RESULTS:  Lab Results   Component Value Date     10/06/2021     05/03/2019    POTASSIUM 4.0 10/06/2021    POTASSIUM 3.8 05/03/2019    CHLORIDE 109 10/06/2021    CHLORIDE 109 05/03/2019    CO2 25 10/06/2021    CO2 20 05/03/2019    ANIONGAP 5 10/06/2021    ANIONGAP 9 05/03/2019     (H) 10/08/2021     (H) 10/06/2021    GLC 97 05/03/2019    BUN 18 10/06/2021    BUN 15 05/03/2019    CR 1.00 10/08/2021    CR 1.09 05/03/2019    GFRESTIMATED >90 10/08/2021    GFRESTIMATED 86 05/03/2019    GFRESTBLACK >90 05/03/2019    NIYA 7.4 (L) 10/06/2021    NIYA 8.8 05/03/2019       Procedures:   None today      Assessment and Plan:     1.  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   -Continue Xarelto  -Follow up with   Scot in 3 months with Venous duplex.         2.  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       -His triglycerides have been greater than 500.  His direct LDL was 125.    -Started on Crestor 20 mg daily and Fenofibrate 160 mg daily in 10/2021.   -In 3 months check advanced lipid testing and followup with Dr. Street 2 weeks later.        Radha Pelaez PA-C      30 minutes spent on the date of the encounter doing chart review, history and exam, documentation, and further activities as noted above.

## 2021-11-22 DIAGNOSIS — I82.629 ACUTE DEEP VEIN THROMBOSIS (DVT) OF OTHER VEIN OF UPPER EXTREMITY, UNSPECIFIED LATERALITY (H): ICD-10-CM

## 2021-11-22 RX ORDER — RIVAROXABAN 15 MG/1
TABLET, FILM COATED ORAL
Qty: 40 TABLET | Refills: 0 | OUTPATIENT
Start: 2021-11-22

## 2021-11-24 ENCOUNTER — TELEPHONE (OUTPATIENT)
Dept: OTHER | Facility: CLINIC | Age: 41
End: 2021-11-24
Payer: COMMERCIAL

## 2021-11-24 NOTE — TELEPHONE ENCOUNTER
[facility]    Who is the name of the provider?:  Scot      What is the location you see this provider at?: [place]    Reason for call:  States he is going into residential drug and alcohol treatment facility today.  They are requiring a release by Dr. Ellison to go into rehab and return to normal activity, might need to include his current anticoagulant therapy.  Please e-mail to Irene Patterson:  [email or web address], secure e-mail address.  Irene's phone: [phone].  Call Pool with questions.   They must have this today.    Can we leave a detailed message on this number?  YES

## 2021-11-24 NOTE — LETTER
November 24, 2021      Pool Ontiveros  [address] [place] [address]        To Whom It May Concern:    Pool Ontiveros has been under my professional medical care. He is cleared for full and normal activity.  He is cleared to do residential alcohol treatment, including detox.  He is on the blood thinner Xarelto and will be on this for a minimum of three months.  This does not affect his activities or treatment.    Sincerely,        Jovi Ellison

## 2021-11-24 NOTE — TELEPHONE ENCOUNTER
Letter completed at sent via email to [email or web address].  ROXANA Kwok, RN-[facility] [place]

## 2022-01-07 ENCOUNTER — LAB (OUTPATIENT)
Dept: LAB | Facility: CLINIC | Age: 42
End: 2022-01-07
Payer: COMMERCIAL

## 2022-01-07 DIAGNOSIS — E78.2 MIXED HYPERLIPIDEMIA: ICD-10-CM

## 2022-01-07 LAB
CRP SERPL HS-MCNC: 0.4 MG/L
HBA1C MFR BLD: 5.3 % (ref 0–5.6)

## 2022-01-07 PROCEDURE — 86141 C-REACTIVE PROTEIN HS: CPT

## 2022-01-07 PROCEDURE — 83695 ASSAY OF LIPOPROTEIN(A): CPT | Mod: 90

## 2022-01-07 PROCEDURE — 83036 HEMOGLOBIN GLYCOSYLATED A1C: CPT

## 2022-01-07 PROCEDURE — 80061 LIPID PANEL: CPT | Mod: 90

## 2022-01-07 PROCEDURE — 36415 COLL VENOUS BLD VENIPUNCTURE: CPT

## 2022-01-07 PROCEDURE — 83704 LIPOPROTEIN BLD QUAN PART: CPT | Mod: 90

## 2022-01-07 PROCEDURE — 99000 SPECIMEN HANDLING OFFICE-LAB: CPT

## 2022-01-08 LAB — LPA SERPL-MCNC: <6 MG/DL

## 2022-01-28 ENCOUNTER — OFFICE VISIT (OUTPATIENT)
Dept: OTHER | Facility: CLINIC | Age: 42
End: 2022-01-28
Attending: INTERNAL MEDICINE
Payer: COMMERCIAL

## 2022-01-28 VITALS — SYSTOLIC BLOOD PRESSURE: 117 MMHG | DIASTOLIC BLOOD PRESSURE: 77 MMHG | HEART RATE: 69 BPM

## 2022-01-28 DIAGNOSIS — G54.0 TOS (THORACIC OUTLET SYNDROME): ICD-10-CM

## 2022-01-28 DIAGNOSIS — E78.5 HYPERLIPIDEMIA LDL GOAL <100: Primary | ICD-10-CM

## 2022-01-28 DIAGNOSIS — I82.629 ACUTE DEEP VEIN THROMBOSIS (DVT) OF OTHER VEIN OF UPPER EXTREMITY, UNSPECIFIED LATERALITY (H): ICD-10-CM

## 2022-01-28 DIAGNOSIS — E78.2 MIXED HYPERLIPIDEMIA: ICD-10-CM

## 2022-01-28 PROCEDURE — 99214 OFFICE O/P EST MOD 30 MIN: CPT | Performed by: INTERNAL MEDICINE

## 2022-01-28 PROCEDURE — G0463 HOSPITAL OUTPT CLINIC VISIT: HCPCS

## 2022-01-28 RX ORDER — ROSUVASTATIN CALCIUM 40 MG/1
40 TABLET, COATED ORAL DAILY
Qty: 90 TABLET | Refills: 3 | Status: SHIPPED | OUTPATIENT
Start: 2022-01-28 | End: 2023-01-26

## 2022-01-28 RX ORDER — FENOFIBRATE 160 MG/1
160 TABLET ORAL DAILY
Qty: 90 TABLET | Refills: 3 | Status: SHIPPED | OUTPATIENT
Start: 2022-01-28 | End: 2023-01-26

## 2022-01-28 NOTE — H&P (VIEW-ONLY)
Pool Ontiveros is a 41 year old male who is presenting at the current time to discuss his diagnosi(es) of        TOS (thoracic outlet syndrome)  Acute deep vein thrombosis (DVT) of other vein of upper extremity, unspecified laterality (H)      HPI:  Pool Ontiveros is a very pleasant 41 year old male with a history of hyperlipidemia and bipolar disorder well managed  with sertraline.  For most of his adult life he has lifted weights.  He took a 6 months break from lifting, but then had started back up.  When he started back up he felt his left arm became tight. He presented to the ED where he was noted to to have an occlusive left subclavian and axillary thrombosis as well as superficial left cephalic vein thrombosis.  He was started on IV heparin and taken to the Interventional Radiology suite for catheter-directed lysis and mechanical thrombectomy. Exam was significant for thoracic outlet syndrome and Dr. Ellison took him to the OR for left first rib resection and scalenectomy on 10/7/21. He was sent home on Xarelto 15 mg BID.      He returned for a venogram on 10/25/21 where the left subclavian vein was occluded and there were large collaterals explaining his minimal swelling.  Dr. Zamarripa was able to easily cannulate the subclavian vein.  Sequential angioplasty was performed up to a 10 mm balloon with good results and good flow.  Minimal irregularity noted on the vein.      On 11/3/21 he called with significant drainage from incision site. He came in and was noted to have unusual medial breakdown of incision.  It was suspected that he may have had an underlying seroma in the setting of primarily serosanguineous fluid. Area was debrided and reapproximated with nylon sutures.     Repeat venogram on 11/12/21 with patent veins.            Review Of Systems  Skin: negative  Eyes: negative  Ears/Nose/Throat: negative  Respiratory: No shortness of breath, dyspnea on exertion, cough, or hemoptysis  Cardiovascular:  negative  Gastrointestinal: negative  Genitourinary: negative  Musculoskeletal: negative  Neurologic: negative  Psychiatric: negative  Hematologic/Lymphatic/Immunologic: negative  Endocrine: negative      PAST MEDICAL HISTORY:                  Past Medical History:   Diagnosis Date     Depression with anxiety 7/19/2016     Hernia, abdominal      History of atrial septal defect repair 3/5/2012    Overview:  repaired age 5      History of inguinal hernia repair 3/5/2012    Overview:  Age 6      Mixed hyperlipidemia 7/19/2016       PAST SURGICAL HISTORY:                  Past Surgical History:   Procedure Laterality Date     HERNIA REPAIR       IR ANGIOGRAM THROUGH CATHETER FOLLOW UP  10/6/2021     IR UPPER EXTREMITY VENOGRAM LEFT  10/5/2021     IR UPPER EXTREMITY VENOGRAM LEFT  10/25/2021     IR UPPER EXTREMITY VENOGRAM LEFT  11/12/2021     TRANSAXILLARY RESECT FIRST RIB Left 10/7/2021    Procedure: LEFT TRANSAXILLARY FIRST RIB RESECTION AND SCALENECTOMY.;  Surgeon: Jovi Ellison;  Location: [facility] OR       CURRENT MEDICATIONS:                  Current Outpatient Medications   Medication Sig Dispense Refill     acetaminophen (TYLENOL) 325 MG tablet Take 2 tablets (650 mg) by mouth every 6 hours as needed for mild pain or other (and adjunct with moderate or severe pain or per patient request)       cephALEXin (KEFLEX) 500 MG capsule Take 1 capsule (500 mg) by mouth 4 times daily (Patient not taking: Reported on 11/17/2021) 28 capsule 1     fenofibrate (TRIGLIDE/LOFIBRA) 160 MG tablet Take 1 tablet (160 mg) by mouth daily 90 tablet 3     rivaroxaban ANTICOAGULANT (XARELTO ANTICOAGULANT) 20 MG TABS tablet Take 1 tablet (20 mg) by mouth daily (with dinner) 30 tablet 3     rosuvastatin (CRESTOR) 40 MG tablet Take 1 tablet (40 mg) by mouth daily 90 tablet 3     senna-docusate (SENOKOT-S/PERICOLACE) 8.6-50 MG tablet Take 1 tablet by mouth 2 times daily as needed for constipation (Patient not taking: Reported on  11/17/2021) 30 tablet 0     sertraline (ZOLOFT) 100 MG tablet TAKE 1 TABLET BY MOUTH EVERY DAY 90 tablet 1       ALLERGIES:                  Allergies   Allergen Reactions     Vicodin [Hydrocodone-Acetaminophen] Hives       SOCIAL HISTORY:                  Social History     Socioeconomic History     Marital status:      Spouse name: Not on file     Number of children: Not on file     Years of education: Not on file     Highest education level: Not on file   Occupational History     Not on file   Tobacco Use     Smoking status: Never Smoker     Smokeless tobacco: Former User     Types: Chew     Tobacco comment: Gum, ocasionally    Substance and Sexual Activity     Alcohol use: Yes     Comment: 10-15 drinks per week     Drug use: No     Sexual activity: Yes     Partners: Female   Other Topics Concern     Parent/sibling w/ CABG, MI or angioplasty before 65F 55M? Not Asked   Social History Narrative     Not on file     Social Determinants of Health     Financial Resource Strain: Not on file   Food Insecurity: Not on file   Transportation Needs: Not on file   Physical Activity: Not on file   Stress: Not on file   Social Connections: Not on file   Intimate Partner Violence: Not on file   Housing Stability: Not on file       FAMILY HISTORY:                   Family History   Problem Relation Age of Onset     Bipolar Disorder Mother      Depression Mother        Physical exam Reveals:    O/P: WNL  HEENT: WNL  NECK: No JVD, thyromegaly, or lymphadenopathy  HEART: RRR, no murmurs, gallops, or rubs  LUNGS: CTA bilaterally without rales, wheezes, or rhonchi  GI: NABS, nondistended, nontender, soft  EXT:without cyanosis, clubbing, or edema  NEURO: nonfocal  : no flank tenderness    Component      Latest Ref Rng & Units 1/7/2022   Lipoprotein (a)      <=29 mg/dL <6   C-Reactive Protein High Sensitivity      mg/L 0.4   Hemoglobin A1C      0.0 - 5.6 % 5.3   LDL-C:       51  LDL-P:     927         1.  Left upper extremity  DVT secondary to venous thoracic outlet syndrome s/p left first rib resection and scalenectomy 10/27/21       -Most recent venogram on 11/12/21 shows veins are patent.   -Continue Xarelto  -Follow up with Dr. Ellison in February with Venous duplex, would favor six total months AC w/ Xarelto. This would therefore stop around 4/7/2021. Check d dimer at that time.        2.  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       -His triglycerides have been greater than 500.  His direct LDL was 125.    -Started on Crestor 20 mg daily and Fenofibrate 160 mg daily in 10/2021.   -Advanced lipid testing presently reveals LDL-C and LDL-P at goal. Continue meds without change, check lipids in one year through PCP.

## 2022-01-28 NOTE — NURSING NOTE
[facility]        Patient is here for follow up to discuss labs.    Pt is currently taking Xarelto and statin.    /77 (BP Location: Left arm, Patient Position: Sitting, Cuff Size: Adult Large)   Pulse 69     The provider has been notified that the patient has no concerns.     Questions patient would like addressed today are: N/A.    Refills are needed: No    Has homecare services and agency name:  Neema Zamudio RN

## 2022-02-02 ENCOUNTER — TELEPHONE (OUTPATIENT)
Dept: OTHER | Facility: CLINIC | Age: 42
End: 2022-02-02
Payer: COMMERCIAL

## 2022-02-02 ENCOUNTER — TELEPHONE (OUTPATIENT)
Dept: FAMILY MEDICINE | Facility: CLINIC | Age: 42
End: 2022-02-02
Payer: COMMERCIAL

## 2022-02-02 NOTE — TELEPHONE ENCOUNTER
Sertraline (ZOLOFT) 100 MG tablet (PT STATES HE WAS INCREASED  MG OF SERTRALINE DAILY) If this dose is increased, we need a new RX so we can properly bill

## 2022-02-02 NOTE — TELEPHONE ENCOUNTER
Patient Contact    Attempt # 1    Was call answered?  No.  Left message on voicemail with information to call triage back.    On call back:     - see Dr. Bonilla's message below.    Joy JOHNSON RN  EP Triage

## 2022-02-02 NOTE — TELEPHONE ENCOUNTER
Please schedule patient for the following at next available:    Bilateral UE venous US    Right UE arterial US    In clinic visit/video/phone visit with Dr. Ellison to discuss results    Routing to scheduling to contact patient to coordinate above.    Appt note:  History of left subclavian/axillary DVT, s/p left first rib resection 10/7/21; evaluate right arm ; 3 month follow up to 11/12/21 venogram.    DAHLIA KwokN, RN-[facility] [place]

## 2022-02-03 ENCOUNTER — MYC MEDICAL ADVICE (OUTPATIENT)
Dept: FAMILY MEDICINE | Facility: CLINIC | Age: 42
End: 2022-02-03
Payer: COMMERCIAL

## 2022-02-03 DIAGNOSIS — F41.8 DEPRESSION WITH ANXIETY: ICD-10-CM

## 2022-02-03 NOTE — TELEPHONE ENCOUNTER
Patient Contact    Attempt # 2    Was call answered?  No.  Left message on voicemail with information to call triage back.    On call back:     - see Dr Bonilla's message below.    Joy JOHNSON RN  EP Triage

## 2022-02-04 RX ORDER — SERTRALINE HYDROCHLORIDE 100 MG/1
150 TABLET, FILM COATED ORAL DAILY
Qty: 135 TABLET | Refills: 3 | Status: SHIPPED | OUTPATIENT
Start: 2022-02-04 | End: 2023-01-26

## 2022-02-04 NOTE — TELEPHONE ENCOUNTER
LM to schedule. 2nd and final attempt. No more scheduling attempts will be made.       Meka Vega    [facility]   [phone]

## 2022-02-04 NOTE — TELEPHONE ENCOUNTER
Patient returned call stated the Zoloft dose was changed from 100 mg to 150 mg about 60 days ago.  Needs refill for 150 mg daily    Norma Thompson RN

## 2022-02-04 NOTE — TELEPHONE ENCOUNTER
Patient Contact    Attempt # 3    Was call answered?  No.  Left message on voicemail with information to call triage back.    On call back:     -  Need to know if pt is taking 1.5 tabs of sertraline 100 mg (total 150 mg).  Rx for 1.5 tabs sent to pharmacy per Dr. Bonilla on 2/4/22.    Joy JOHNSON RN  EP Triage

## 2022-02-04 NOTE — TELEPHONE ENCOUNTER
PCP see below, pt requesting updated increased sertraline Rx from 100mg daily to 150mg daily    Emily Liu RN  [facility]

## 2022-02-21 ENCOUNTER — TELEPHONE (OUTPATIENT)
Dept: OTHER | Facility: CLINIC | Age: 42
End: 2022-02-21
Payer: COMMERCIAL

## 2022-02-21 ENCOUNTER — HOSPITAL ENCOUNTER (OUTPATIENT)
Dept: ULTRASOUND IMAGING | Facility: CLINIC | Age: 42
End: 2022-02-21
Attending: SURGERY
Payer: COMMERCIAL

## 2022-02-21 DIAGNOSIS — I82.629 ACUTE DEEP VEIN THROMBOSIS (DVT) OF OTHER VEIN OF UPPER EXTREMITY, UNSPECIFIED LATERALITY (H): ICD-10-CM

## 2022-02-21 DIAGNOSIS — G54.0 TOS (THORACIC OUTLET SYNDROME): ICD-10-CM

## 2022-02-21 PROCEDURE — 93970 EXTREMITY STUDY: CPT

## 2022-02-21 PROCEDURE — 93931 UPPER EXTREMITY STUDY: CPT | Mod: RT

## 2022-02-21 NOTE — TELEPHONE ENCOUNTER
[facility]    Pool DAMON Hueyraul called me this morning about his TOS testing just performed.    He has a significant history of vasogenic left TOS with subclavian axillary DVT.  He underwent multiple interventions along with a left transaxillary first rib resection and mobilization of the subclavian vein on 10/7/2021.  He was doing very well on the last venogram check.  Was still on Xarelto but plans to have this discontinued.  This occurred on his nondominant left arm.      We were concerned about his dominant right arm of having a similar problem--thus the testing this morning.  I reviewed this as I spoke with him on the phone.  No significant right arterial compression.  However significant right venous compression where the vein goes down to 1 mm diameter with his arm at 90 degrees and no flow at 180 degrees but still patent.  Some mild narrowing of the left side but patent with all maneuvers.    IMPRESSION: #1.  Successful treatment for left vasogenic TOS.  Some mild narrowing with maneuvers but still widely patent with good flow.  May discontinue his Xarelto and continue with a baby aspirin.       #2.  Significant asymptomatic compression of the right subclavian vein at 90 degrees and 180 degrees.  With the issues that he is dealt with with on his nondominant left arm I feel would be appropriate for him to undergo decompression of the right thoracic outlet to help prevent DVT in the future.  Zain has no further questions will call to schedule this with my office at his convenience.  He will hold the aspirin for 5 days prior to the procedure.       Jovi Ellison MD

## 2022-02-22 ENCOUNTER — TELEPHONE (OUTPATIENT)
Dept: OTHER | Facility: CLINIC | Age: 42
End: 2022-02-22

## 2022-02-22 ENCOUNTER — LAB (OUTPATIENT)
Dept: URGENT CARE | Facility: URGENT CARE | Age: 42
End: 2022-02-22
Attending: SURGERY
Payer: COMMERCIAL

## 2022-02-22 ENCOUNTER — ANESTHESIA EVENT (OUTPATIENT)
Dept: SURGERY | Facility: CLINIC | Age: 42
DRG: 042 | End: 2022-02-22
Payer: COMMERCIAL

## 2022-02-22 DIAGNOSIS — Z01.812 PRE-OPERATIVE LABORATORY EXAMINATION: Primary | ICD-10-CM

## 2022-02-22 DIAGNOSIS — G54.0 TOS (THORACIC OUTLET SYNDROME): ICD-10-CM

## 2022-02-22 DIAGNOSIS — Z01.812 PRE-OPERATIVE LABORATORY EXAMINATION: ICD-10-CM

## 2022-02-22 LAB — SARS-COV-2 RNA RESP QL NAA+PROBE: NEGATIVE

## 2022-02-22 PROCEDURE — U0005 INFEC AGEN DETEC AMPLI PROBE: HCPCS

## 2022-02-22 PROCEDURE — U0003 INFECTIOUS AGENT DETECTION BY NUCLEIC ACID (DNA OR RNA); SEVERE ACUTE RESPIRATORY SYNDROME CORONAVIRUS 2 (SARS-COV-2) (CORONAVIRUS DISEASE [COVID-19]), AMPLIFIED PROBE TECHNIQUE, MAKING USE OF HIGH THROUGHPUT TECHNOLOGIES AS DESCRIBED BY CMS-2020-01-R: HCPCS

## 2022-02-22 NOTE — TELEPHONE ENCOUNTER
[facility]     Who is the name of the provider? Scot    What is the location you see this provider at?    [place]    Reason for call:  Expecting a call back from Lauren this morning per Dr. Ellison.  He needs to know if he will be having surgery tomorrow morning at 7:30am or not.  If so, he probably needs to get a covid test.      Please advise patient.    He needs to know.    :  Zain Ontiveros    Phone number to call:  [phone]    Additional Notes:         Ruthy Stack    [facility]   [phone]

## 2022-02-22 NOTE — TELEPHONE ENCOUNTER
Spoke to pt on the phone and discussed:    Patient Education  Procedure: Right transaxillary first rib resection and scalenectomy  Diagnosis: vasogenic left TOS  Anticoagulation Instruction: Discontinue Xarelto, hold ASA starting now (otherwise 5 days hold).   Pre-Operative Physical Exam: Dr. Ellison updating pts pre op, pt does not need to schedule with PCP.   Allergies:  Updated in Epic  Bowel Prep: n/a  NPO per protocol.   Post Procedure Education: [facility] patient post-procedure fact sheet reviewed with patient.    COVID-19 test to be done within 2-4 days prior to procedure. Our surgery scheduler or staff at the hospital will call you to coordinate the COVID test date/time. Once COVID test is obtained, pt to isolate to reduce risk of exposure up to date of surgery.    Learner(s):patient  Method: Listening  Barriers to Learning:No Barrier  Outcome: Patient did verbalize understanding of above education.    DAHLIA DenisN, RN  [facility]  Office:  [phone] Fax: [phone]

## 2022-02-23 ENCOUNTER — ANESTHESIA (OUTPATIENT)
Dept: SURGERY | Facility: CLINIC | Age: 42
DRG: 042 | End: 2022-02-23
Payer: COMMERCIAL

## 2022-02-23 ENCOUNTER — APPOINTMENT (OUTPATIENT)
Dept: SURGERY | Facility: PHYSICIAN GROUP | Age: 42
End: 2022-02-23
Payer: COMMERCIAL

## 2022-02-23 ENCOUNTER — HOSPITAL ENCOUNTER (INPATIENT)
Facility: CLINIC | Age: 42
LOS: 1 days | Discharge: HOME | DRG: 042 | End: 2022-02-24
Attending: SURGERY | Admitting: SURGERY
Payer: COMMERCIAL

## 2022-02-23 ENCOUNTER — APPOINTMENT (OUTPATIENT)
Dept: GENERAL RADIOLOGY | Facility: CLINIC | Age: 42
DRG: 042 | End: 2022-02-23
Attending: SURGERY
Payer: COMMERCIAL

## 2022-02-23 DIAGNOSIS — G54.0 TOS (THORACIC OUTLET SYNDROME): Primary | ICD-10-CM

## 2022-02-23 LAB — HGB BLD-MCNC: 13.9 G/DL (ref 13.3–17.7)

## 2022-02-23 PROCEDURE — 250N000009 HC RX 250: Performed by: NURSE ANESTHETIST, CERTIFIED REGISTERED

## 2022-02-23 PROCEDURE — 360N000077 HC SURGERY LEVEL 4, PER MIN: Performed by: SURGERY

## 2022-02-23 PROCEDURE — 272N000001 HC OR GENERAL SUPPLY STERILE: Performed by: SURGERY

## 2022-02-23 PROCEDURE — 01N30ZZ RELEASE BRACHIAL PLEXUS, OPEN APPROACH: ICD-10-PCS | Performed by: SURGERY

## 2022-02-23 PROCEDURE — 85018 HEMOGLOBIN: CPT | Performed by: ANESTHESIOLOGY

## 2022-02-23 PROCEDURE — 258N000003 HC RX IP 258 OP 636: Performed by: ANESTHESIOLOGY

## 2022-02-23 PROCEDURE — 250N000025 HC SEVOFLURANE, PER MIN: Performed by: SURGERY

## 2022-02-23 PROCEDURE — 250N000011 HC RX IP 250 OP 636: Performed by: SURGERY

## 2022-02-23 PROCEDURE — 250N000011 HC RX IP 250 OP 636: Performed by: NURSE ANESTHETIST, CERTIFIED REGISTERED

## 2022-02-23 PROCEDURE — 258N000003 HC RX IP 258 OP 636: Performed by: SURGERY

## 2022-02-23 PROCEDURE — 999N000141 HC STATISTIC PRE-PROCEDURE NURSING ASSESSMENT: Performed by: SURGERY

## 2022-02-23 PROCEDURE — 21615 EXCISION 1ST &/CERVICAL RIB: CPT | Mod: RT | Performed by: SURGERY

## 2022-02-23 PROCEDURE — 258N000003 HC RX IP 258 OP 636: Performed by: NURSE ANESTHETIST, CERTIFIED REGISTERED

## 2022-02-23 PROCEDURE — 250N000009 HC RX 250: Performed by: SURGERY

## 2022-02-23 PROCEDURE — 120N000001 HC R&B MED SURG/OB

## 2022-02-23 PROCEDURE — 999N000063 XR CHEST PORT 1 VIEW

## 2022-02-23 PROCEDURE — 710N000009 HC RECOVERY PHASE 1, LEVEL 1, PER MIN: Performed by: SURGERY

## 2022-02-23 PROCEDURE — 370N000017 HC ANESTHESIA TECHNICAL FEE, PER MIN: Performed by: SURGERY

## 2022-02-23 PROCEDURE — 250N000013 HC RX MED GY IP 250 OP 250 PS 637: Performed by: SURGERY

## 2022-02-23 RX ORDER — DEXAMETHASONE SODIUM PHOSPHATE 4 MG/ML
INJECTION, SOLUTION INTRA-ARTICULAR; INTRALESIONAL; INTRAMUSCULAR; INTRAVENOUS; SOFT TISSUE PRN
Status: DISCONTINUED | OUTPATIENT
Start: 2022-02-23 | End: 2022-02-23

## 2022-02-23 RX ORDER — PROPOFOL 10 MG/ML
INJECTION, EMULSION INTRAVENOUS PRN
Status: DISCONTINUED | OUTPATIENT
Start: 2022-02-23 | End: 2022-02-23

## 2022-02-23 RX ORDER — NALOXONE HYDROCHLORIDE 0.4 MG/ML
0.2 INJECTION, SOLUTION INTRAMUSCULAR; INTRAVENOUS; SUBCUTANEOUS
Status: DISCONTINUED | OUTPATIENT
Start: 2022-02-23 | End: 2022-02-24 | Stop reason: HOSPADM

## 2022-02-23 RX ORDER — SODIUM CHLORIDE, SODIUM LACTATE, POTASSIUM CHLORIDE, CALCIUM CHLORIDE 600; 310; 30; 20 MG/100ML; MG/100ML; MG/100ML; MG/100ML
INJECTION, SOLUTION INTRAVENOUS CONTINUOUS
Status: DISCONTINUED | OUTPATIENT
Start: 2022-02-23 | End: 2022-02-23 | Stop reason: HOSPADM

## 2022-02-23 RX ORDER — BUPIVACAINE HYDROCHLORIDE 5 MG/ML
INJECTION, SOLUTION PERINEURAL PRN
Status: DISCONTINUED | OUTPATIENT
Start: 2022-02-23 | End: 2022-02-23 | Stop reason: HOSPADM

## 2022-02-23 RX ORDER — SODIUM CHLORIDE, SODIUM LACTATE, POTASSIUM CHLORIDE, CALCIUM CHLORIDE 600; 310; 30; 20 MG/100ML; MG/100ML; MG/100ML; MG/100ML
INJECTION, SOLUTION INTRAVENOUS CONTINUOUS
Status: ACTIVE | OUTPATIENT
Start: 2022-02-23 | End: 2022-02-23

## 2022-02-23 RX ORDER — GLYCOPYRROLATE 0.2 MG/ML
INJECTION, SOLUTION INTRAMUSCULAR; INTRAVENOUS PRN
Status: DISCONTINUED | OUTPATIENT
Start: 2022-02-23 | End: 2022-02-23

## 2022-02-23 RX ORDER — LIDOCAINE HYDROCHLORIDE 20 MG/ML
INJECTION, SOLUTION INFILTRATION; PERINEURAL PRN
Status: DISCONTINUED | OUTPATIENT
Start: 2022-02-23 | End: 2022-02-23

## 2022-02-23 RX ORDER — ACETAMINOPHEN 325 MG/1
500 TABLET ORAL ONCE
COMMUNITY
End: 2022-09-08

## 2022-02-23 RX ORDER — EPHEDRINE SULFATE 50 MG/ML
INJECTION, SOLUTION INTRAMUSCULAR; INTRAVENOUS; SUBCUTANEOUS PRN
Status: DISCONTINUED | OUTPATIENT
Start: 2022-02-23 | End: 2022-02-23

## 2022-02-23 RX ORDER — KETOROLAC TROMETHAMINE 30 MG/ML
INJECTION, SOLUTION INTRAMUSCULAR; INTRAVENOUS PRN
Status: DISCONTINUED | OUTPATIENT
Start: 2022-02-23 | End: 2022-02-23

## 2022-02-23 RX ORDER — FENTANYL CITRATE 50 UG/ML
INJECTION, SOLUTION INTRAMUSCULAR; INTRAVENOUS PRN
Status: DISCONTINUED | OUTPATIENT
Start: 2022-02-23 | End: 2022-02-23

## 2022-02-23 RX ORDER — ACETAMINOPHEN 325 MG/1
650 TABLET ORAL EVERY 6 HOURS
Status: DISCONTINUED | OUTPATIENT
Start: 2022-02-23 | End: 2022-02-24 | Stop reason: HOSPADM

## 2022-02-23 RX ORDER — ONDANSETRON 4 MG/1
4 TABLET, ORALLY DISINTEGRATING ORAL EVERY 30 MIN PRN
Status: DISCONTINUED | OUTPATIENT
Start: 2022-02-23 | End: 2022-02-23 | Stop reason: HOSPADM

## 2022-02-23 RX ORDER — HYDROMORPHONE HCL IN WATER/PF 6 MG/30 ML
0.2 PATIENT CONTROLLED ANALGESIA SYRINGE INTRAVENOUS EVERY 5 MIN PRN
Status: DISCONTINUED | OUTPATIENT
Start: 2022-02-23 | End: 2022-02-23 | Stop reason: HOSPADM

## 2022-02-23 RX ORDER — ONDANSETRON 2 MG/ML
INJECTION INTRAMUSCULAR; INTRAVENOUS PRN
Status: DISCONTINUED | OUTPATIENT
Start: 2022-02-23 | End: 2022-02-23

## 2022-02-23 RX ORDER — FENOFIBRATE 160 MG/1
160 TABLET ORAL DAILY
Status: DISCONTINUED | OUTPATIENT
Start: 2022-02-24 | End: 2022-02-24 | Stop reason: HOSPADM

## 2022-02-23 RX ORDER — LIDOCAINE 40 MG/G
CREAM TOPICAL
Status: DISCONTINUED | OUTPATIENT
Start: 2022-02-23 | End: 2022-02-24 | Stop reason: HOSPADM

## 2022-02-23 RX ORDER — NALOXONE HYDROCHLORIDE 0.4 MG/ML
0.4 INJECTION, SOLUTION INTRAMUSCULAR; INTRAVENOUS; SUBCUTANEOUS
Status: DISCONTINUED | OUTPATIENT
Start: 2022-02-23 | End: 2022-02-24 | Stop reason: HOSPADM

## 2022-02-23 RX ORDER — CYCLOBENZAPRINE HCL 10 MG
10 TABLET ORAL 3 TIMES DAILY
Status: DISCONTINUED | OUTPATIENT
Start: 2022-02-23 | End: 2022-02-24 | Stop reason: HOSPADM

## 2022-02-23 RX ORDER — OXYCODONE HYDROCHLORIDE 5 MG/1
5 TABLET ORAL EVERY 4 HOURS PRN
Status: DISCONTINUED | OUTPATIENT
Start: 2022-02-23 | End: 2022-02-24 | Stop reason: HOSPADM

## 2022-02-23 RX ORDER — ONDANSETRON 2 MG/ML
4 INJECTION INTRAMUSCULAR; INTRAVENOUS EVERY 30 MIN PRN
Status: DISCONTINUED | OUTPATIENT
Start: 2022-02-23 | End: 2022-02-23 | Stop reason: HOSPADM

## 2022-02-23 RX ORDER — CALCIUM CARBONATE 500 MG/1
500 TABLET, CHEWABLE ORAL 4 TIMES DAILY PRN
Status: DISCONTINUED | OUTPATIENT
Start: 2022-02-23 | End: 2022-02-24 | Stop reason: HOSPADM

## 2022-02-23 RX ORDER — CEFAZOLIN SODIUM/WATER 2 G/20 ML
2 SYRINGE (ML) INTRAVENOUS
Status: COMPLETED | OUTPATIENT
Start: 2022-02-23 | End: 2022-02-23

## 2022-02-23 RX ORDER — NEOSTIGMINE METHYLSULFATE 1 MG/ML
VIAL (ML) INJECTION PRN
Status: DISCONTINUED | OUTPATIENT
Start: 2022-02-23 | End: 2022-02-23

## 2022-02-23 RX ORDER — FENTANYL CITRATE 0.05 MG/ML
25 INJECTION, SOLUTION INTRAMUSCULAR; INTRAVENOUS EVERY 5 MIN PRN
Status: DISCONTINUED | OUTPATIENT
Start: 2022-02-23 | End: 2022-02-23 | Stop reason: HOSPADM

## 2022-02-23 RX ORDER — KETOROLAC TROMETHAMINE 15 MG/ML
15 INJECTION, SOLUTION INTRAMUSCULAR; INTRAVENOUS EVERY 6 HOURS
Status: DISCONTINUED | OUTPATIENT
Start: 2022-02-23 | End: 2022-02-24 | Stop reason: HOSPADM

## 2022-02-23 RX ORDER — ROSUVASTATIN CALCIUM 20 MG/1
40 TABLET, COATED ORAL AT BEDTIME
Status: DISCONTINUED | OUTPATIENT
Start: 2022-02-24 | End: 2022-02-24 | Stop reason: HOSPADM

## 2022-02-23 RX ORDER — OXYCODONE HYDROCHLORIDE 5 MG/1
10 TABLET ORAL EVERY 4 HOURS PRN
Status: DISCONTINUED | OUTPATIENT
Start: 2022-02-23 | End: 2022-02-24 | Stop reason: HOSPADM

## 2022-02-23 RX ORDER — OXYCODONE HYDROCHLORIDE 5 MG/1
5 TABLET ORAL EVERY 4 HOURS PRN
Status: CANCELLED | OUTPATIENT
Start: 2022-02-23

## 2022-02-23 RX ORDER — GABAPENTIN 100 MG/1
200 CAPSULE ORAL 2 TIMES DAILY
Status: DISCONTINUED | OUTPATIENT
Start: 2022-02-23 | End: 2022-02-24 | Stop reason: HOSPADM

## 2022-02-23 RX ADMIN — KETOROLAC TROMETHAMINE 30 MG: 30 INJECTION, SOLUTION INTRAMUSCULAR at 08:51

## 2022-02-23 RX ADMIN — LIDOCAINE HYDROCHLORIDE 100 MG: 20 INJECTION, SOLUTION INFILTRATION; PERINEURAL at 07:31

## 2022-02-23 RX ADMIN — FENTANYL CITRATE 100 MCG: 50 INJECTION, SOLUTION INTRAMUSCULAR; INTRAVENOUS at 07:31

## 2022-02-23 RX ADMIN — NEOSTIGMINE METHYLSULFATE 5 MG: 1 INJECTION, SOLUTION INTRAVENOUS at 09:01

## 2022-02-23 RX ADMIN — MIDAZOLAM 2 MG: 1 INJECTION INTRAMUSCULAR; INTRAVENOUS at 07:25

## 2022-02-23 RX ADMIN — KETOROLAC TROMETHAMINE 15 MG: 15 INJECTION, SOLUTION INTRAMUSCULAR; INTRAVENOUS at 14:26

## 2022-02-23 RX ADMIN — PROPOFOL 200 MG: 10 INJECTION, EMULSION INTRAVENOUS at 07:31

## 2022-02-23 RX ADMIN — GLYCOPYRROLATE 0.2 MG: 0.2 INJECTION, SOLUTION INTRAMUSCULAR; INTRAVENOUS at 07:40

## 2022-02-23 RX ADMIN — GABAPENTIN 200 MG: 100 CAPSULE ORAL at 12:18

## 2022-02-23 RX ADMIN — DEXAMETHASONE SODIUM PHOSPHATE 4 MG: 4 INJECTION, SOLUTION INTRA-ARTICULAR; INTRALESIONAL; INTRAMUSCULAR; INTRAVENOUS; SOFT TISSUE at 07:38

## 2022-02-23 RX ADMIN — CYCLOBENZAPRINE 10 MG: 10 TABLET, FILM COATED ORAL at 17:43

## 2022-02-23 RX ADMIN — DEXMEDETOMIDINE HYDROCHLORIDE 12 MCG: 100 INJECTION, SOLUTION INTRAVENOUS at 09:14

## 2022-02-23 RX ADMIN — HYDROMORPHONE HYDROCHLORIDE 0.5 MG: 1 INJECTION, SOLUTION INTRAMUSCULAR; INTRAVENOUS; SUBCUTANEOUS at 07:54

## 2022-02-23 RX ADMIN — CYCLOBENZAPRINE 10 MG: 10 TABLET, FILM COATED ORAL at 12:18

## 2022-02-23 RX ADMIN — Medication 10 MG: at 07:40

## 2022-02-23 RX ADMIN — ACETAMINOPHEN 650 MG: 325 TABLET, FILM COATED ORAL at 12:18

## 2022-02-23 RX ADMIN — SODIUM CHLORIDE, POTASSIUM CHLORIDE, SODIUM LACTATE AND CALCIUM CHLORIDE: 600; 310; 30; 20 INJECTION, SOLUTION INTRAVENOUS at 07:10

## 2022-02-23 RX ADMIN — Medication 5 MG: at 08:18

## 2022-02-23 RX ADMIN — DEXMEDETOMIDINE HYDROCHLORIDE 8 MCG: 100 INJECTION, SOLUTION INTRAVENOUS at 09:15

## 2022-02-23 RX ADMIN — ONDANSETRON 4 MG: 2 INJECTION INTRAMUSCULAR; INTRAVENOUS at 08:59

## 2022-02-23 RX ADMIN — Medication 5 MG: at 08:37

## 2022-02-23 RX ADMIN — GLYCOPYRROLATE 0.8 MG: 0.2 INJECTION, SOLUTION INTRAMUSCULAR; INTRAVENOUS at 09:01

## 2022-02-23 RX ADMIN — Medication 1 LOZENGE: at 14:26

## 2022-02-23 RX ADMIN — Medication 2 G: at 07:25

## 2022-02-23 RX ADMIN — Medication 5 MG: at 07:59

## 2022-02-23 RX ADMIN — KETOROLAC TROMETHAMINE 15 MG: 15 INJECTION, SOLUTION INTRAMUSCULAR; INTRAVENOUS at 21:06

## 2022-02-23 RX ADMIN — ACETAMINOPHEN 650 MG: 325 TABLET, FILM COATED ORAL at 17:43

## 2022-02-23 RX ADMIN — ROCURONIUM BROMIDE 50 MG: 50 INJECTION, SOLUTION INTRAVENOUS at 07:31

## 2022-02-23 RX ADMIN — SODIUM CHLORIDE, POTASSIUM CHLORIDE, SODIUM LACTATE AND CALCIUM CHLORIDE: 600; 310; 30; 20 INJECTION, SOLUTION INTRAVENOUS at 08:27

## 2022-02-23 RX ADMIN — SODIUM CHLORIDE, POTASSIUM CHLORIDE, SODIUM LACTATE AND CALCIUM CHLORIDE: 600; 310; 30; 20 INJECTION, SOLUTION INTRAVENOUS at 12:19

## 2022-02-23 ASSESSMENT — ACTIVITIES OF DAILY LIVING (ADL)
ADLS_ACUITY_SCORE: 5
ADLS_ACUITY_SCORE: 3
ADLS_ACUITY_SCORE: 5
ADLS_ACUITY_SCORE: 3
ADLS_ACUITY_SCORE: 5
ADLS_ACUITY_SCORE: 3
ADLS_ACUITY_SCORE: 5
ADLS_ACUITY_SCORE: 10
ADLS_ACUITY_SCORE: 5
ADLS_ACUITY_SCORE: 3
ADLS_ACUITY_SCORE: 5
ADLS_ACUITY_SCORE: 3
ADLS_ACUITY_SCORE: 5

## 2022-02-23 ASSESSMENT — LIFESTYLE VARIABLES: TOBACCO_USE: 1

## 2022-02-23 NOTE — OP NOTE
Procedure Date: 02/23/2022    PREOPERATIVE DIAGNOSIS:  Vasogenic right thoracic outlet syndrome.    POSTOPERATIVE DIAGNOSIS:  Vasogenic right thoracic outlet syndrome.    OPERATIVE PROCEDURE PERFORMED:  Right transaxillary first rib resection and scalenectomy.    SURGEON:  Jovi Ellison MD    FIRST ASSISTANT:  Sepideh Salazar MD(Vascular Fellow)    :  Rosie Miranda MD  ( [facility] [place]    ANESTHESIA:  General.    MEDICATIONS:  Ancef 2 grams IV. Tylenol 1000 mg po    INDICATIONS FOR PROCEDURE:  A 41-year-old patient suffered a left subclavian DVT due to vasogenic TOS.  He has undergone successful surgical intervention Radiology treatment.  We have noticed on his dominant arm significant compression with his arm elevated, the right subclavian vein, a complete occlusion when his arm is at 180 degrees.  With the situation that developed on his nondominant arm and the fact that he does use his arms in this position, we felt that decompression of the right thoracic outlet would help prevent problems developing in this arm.  He comes to the operating room today under informed consent.    OPERATIVE DESCRIPTION OF PROCEDURE:  The patient was brought to the operating room induced under general anesthesia, went without difficulty.  Calf pneumatic compression boots were used.  He was placed in a partial right lateral thoracotomy position.  The entire right arm and chest wall were prepped and draped.  A timeout was called and the sites were identified.      A low transverse axillary incision was made.  Dissection was carried down to the chest wall.  A crossing vein was ligated between 3-0 silk sutures for appropriate exposure.  He is a larger patient and very muscular, making exposure slightly more difficult.  We dissected down to the first rib.  With the electrocautery, the intercostal muscles between the 1st and 2nd ribs were divided both anterior and posteriorly.    SCALENECTOMY:  A Tena  retractor was then placed under the first rib, both anterior and posteriorly.  We identified the anterior scalene muscle and this was dissected off of the otherwise unremarkable subclavian artery and vein.  This was a larger muscle and certainly could have led to his compression that we noticed on TOS maneuvers.  A right angle clamp was used to go under the anterior scalene muscle and this was divided with electrocautery with excellent hemostasis.  All fibers were divided to allow this to retract well above the subclavian artery and vein.  We then used retractors to slightly retract the subclavian artery and the lower portion of the brachial plexus.  The middle scalene muscle was divided medially off the rib to its posterior attachments with good hemostasis.    RIB RESECTION:  A 45-degree  was used to cut the posterior aspect of the 1st rib.  Anteriorly, a Leksell rongeur was used to come across the rib and remove the main segment.  We then divided the majority of the costoclavicular ligament and with a Leksell rongeur, removed more of the first rib close, but not to the articular origin, but completely eliminating any type of compression on the subclavian vein.  Posteriorly, we removed the rib with a Leksell rongeur to its posterior attachments, but not to the articulation cartilage due to the large size of the patient.    Inspection revealed that we had a very dry field.  The pleura was intact.  There was no evidence of any extrinsic compression on the neurovascular structures, particularly the subclavian vein.  A #19 round fluted drain was brought through an inferior stab incision and placed within the apex of our incision.  The wound was closed with interrupted 2-0 and 3-0 Vicryl suture deep and the skin with 4-0 Monocryl in a subcuticular fashion.  Toradol 30 mg IV was given for postoperative analgesia along with a field block of 0.5% Marcaine.  Gauze dressings were applied.    The patient tolerated  the procedure well.    ESTIMATED BLOOD LOSS:  Less than 15 mL    COMPLICATIONS:  None.    The patient was extubated and returned to recovery in satisfactory condition with no evidence of complications.    Jovi Ellison MD        D: 2022   T: 2022   MT: CLAUDIA    Name:     CATERINA WALLACE  MRN:      [id number]        Account:        [id number]   :      1980           Procedure Date: 2022     Document: [id number]

## 2022-02-23 NOTE — H&P
VASCULAR SURGERY    CC: Vasogenic TOS    HPI: 41-year-old very active male initially presented with left arm DVT due to vasogenic TOS.  Underwent lytic therapy followed by a left transaxillary first rib resection scalenectomy on 10/7/2021.  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  Underwent multiple Interventional radiology procedures to maintain patency of the left subclavian vein.  Last venogram 11/12/2021 showed only minimal compression.  He was kept on Xarelto until just recently.  Ultrasound on 2/21/2022 confirmed patency of the left subclavian vein with maneuvers and only minimal compression.    We were concerned about his dominant right arm having similar issues to the left.  Ultrasound 2/21/2022 did show significant compression of the right dominant subclavian vein with his arm at 90 degrees and no flow in 180 degrees.  Due to the issues that developed on his nondominant arm we felt that elective decompression of the right thoracic outlet was indicated and patient agrees.  He last took his Xarelto approximately a week ago and we have not restarted his aspirin due to the planned surgery today.    He has had no swelling or other issues with either arm recently.      PMH: Allergies: Some issues with Vicodin   Medications: Zoloft, Crestor, fenofibrate   Medical: ASD repair at the age of 6 with no cardiac issues    Herniorrhaphy    Left anterior cruciate ligament repair 2016    Familial hyperlipidemia    Bipolar disorder and depression     Non-smoker.  Healthy diet.  Very active.      FMH: History of hyperlipidemia.  No bleeding or anesthetic issues    Exam: Seen in preinduction.  Alert and appropriate.  Very comfortable.   Blood pressure 109/64 pulse 45 regular   Weight= 102.5 kg  BMI= 28.25 kg meter square   HEENT= normal   Chest= clear to auscultation.  Well-healed median    sternotomy    Cardiovascular= regular rate with no murmur   Abdomen= thin, soft, nontender   Extremities= +3  radial and PT pulses bilaterally.    Well-healed left axillary incision.   ` Full range of motion.    Hgb= 13.9   2/22/2022 COVID-19 testing negative  1/7/2022 hemoglobin A1c= 5.3 C-reactive protein= 0.4      Review of previous chest x-rays shows normal rhythm and a on the right without the anomalous fusion of the 1st-2nd rib on the left that we noted at the time of his first surgery.      Impression: Plan for right transaxillary first rib resection and scalenectomy with mobilization subclavian vein.  Risks and benefits reviewed with patient.  Drain will be placed at the time of surgery.       Jovi Ellison MD    Note: Dr. Street of vascular medicine saw patient on 1/28/2022.  Medication changes for his lipids.  Had recommended continuing Xarelto until 4/7/2021 checking D-dimer at that time.

## 2022-02-23 NOTE — ANESTHESIA PROCEDURE NOTES
Airway       Patient location during procedure: OR       Procedure Start/Stop Times: 2/23/2022 7:33 AM  Staff -        CRNA: Gurmeet Rodriguez APRN CRNA       Performed By: CRNA  Consent for Airway        Urgency: elective  Indications and Patient Condition       Indications for airway management: eusebio-procedural         Mask difficulty assessment: 1 - vent by mask    Final Airway Details       Final airway type: endotracheal airway       Successful airway: ETT - single  Endotracheal Airway Details        ETT size (mm): 8.0       Successful intubation technique: video laryngoscopy       VL Blade Size: Glidescope 4       Grade View of Cords: 1       Adjucts: stylet       Position: Right       Measured from: gums/teeth       Secured at (cm): 23       Bite block used: None    Post intubation assessment        Placement verified by: capnometry, equal breath sounds and chest rise        Number of attempts at approach: 1       Number of other approaches attempted: 0       Secured with: pink tape       Ease of procedure: easy       Dentition: Intact and Unchanged

## 2022-02-23 NOTE — PROGRESS NOTES
VASCULAR SURGERY PROGRESS NOTE    Mr. Ontiveros is a 42yo male with thoracic outlet syndrome now POD0 s/p right first rib resection.    Pain well controlled. Breathing without difficulty on room air. Reports some discomfort with deep breathing and also a sore throat. Tolerating ice chips without n/v. Has not yet been out of bed.    Vitals reviewed.    On exam, resting comfortably. 2+ right radial pulse, regular. Eating ice chips with a spoon. SCDs in place. Wife Pooja at bedside.    Mr. Ontiveros is a 42yo male with TOS now s/p right first rib resection    - continue current pain regimen  - ambulate tonight  - keep drain to suction and record output  - encourage deep breathing, IS, aggressive pulmonary toilet    Sepideh Salazar MD  02/23/22  12:42 PM

## 2022-02-23 NOTE — BRIEF OP NOTE
[facility]    Brief Operative Note    Pre-operative diagnosis: TOS (thoracic outlet syndrome) [G54.0]  Post-operative diagnosis Same as pre-operative diagnosis    Procedure: Procedure(s):  RIGHT TRANSAXILLARY FIRST RIB RESECTION AND SCALENECTOMY  Surgeon: Surgeon(s) and Role:     * Jovi Ellison - Primary  Anesthesia: General   Estimated Blood Loss: 15ml    Drains: 19Fr Carlos drain  Specimens:   ID Type Source Tests Collected by Time Destination   A : dispose per policy and exclusion list Bone Resection Rib, Right OR DOCUMENTATION ONLY Jovi Ellison 2/23/2022  8:54 AM      Findings:   See op note for full details  Complications: None.  Implants: * No implants in log *      Sepideh Salazar MD  02/23/22  9:23 AM

## 2022-02-23 NOTE — ANESTHESIA POSTPROCEDURE EVALUATION
Patient: Pool Ontiveros    Procedure: Procedure(s):  RIGHT TRANSAXILLARY FIRST RIB RESECTION AND SCALENECTOMY       Anesthesia Type:  General    Note:  Disposition: Inpatient   Postop Pain Control: Uneventful            Sign Out: Well controlled pain   PONV: No   Neuro/Psych: Uneventful            Sign Out: Acceptable/Baseline neuro status   Airway/Respiratory: Uneventful            Sign Out: Acceptable/Baseline resp. status   CV/Hemodynamics: Uneventful            Sign Out: Acceptable CV status; No obvious hypovolemia; No obvious fluid overload   Other NRE: NONE   DID A NON-ROUTINE EVENT OCCUR? No           Last vitals:  Vitals Value Taken Time   /62 02/23/22 1020   Temp 37.5  C (99.5  F) 02/23/22 1000   Pulse 98 02/23/22 1026   Resp 12 02/23/22 1026   SpO2 99 % 02/23/22 1026   Vitals shown include unvalidated device data.    Electronically Signed By: Haider Nickerson MD  February 23, 2022  10:29 AM

## 2022-02-23 NOTE — ANESTHESIA PREPROCEDURE EVALUATION
Anesthesia Pre-Procedure Evaluation    Patient: Pool Ontiveros   MRN: [id number] : 1980        Procedure : Procedure(s):  RIGHT TRANSAXILLARY FIRST RIB RESECTION AND SCALENECTOMY          Past Medical History:   Diagnosis Date     Depression with anxiety 2016     Hernia, abdominal      History of atrial septal defect repair 3/5/2012    Overview:  repaired age 5      History of inguinal hernia repair 3/5/2012    Overview:  Age 6      Mixed hyperlipidemia 2016      Past Surgical History:   Procedure Laterality Date     HERNIA REPAIR       IR ANGIOGRAM THROUGH CATHETER FOLLOW UP  10/6/2021     IR UPPER EXTREMITY VENOGRAM LEFT  10/5/2021     IR UPPER EXTREMITY VENOGRAM LEFT  10/25/2021     IR UPPER EXTREMITY VENOGRAM LEFT  2021     TRANSAXILLARY RESECT FIRST RIB Left 10/7/2021    Procedure: LEFT TRANSAXILLARY FIRST RIB RESECTION AND SCALENECTOMY.;  Surgeon: Jovi Ellison;  Location: [facility] OR      Allergies   Allergen Reactions     Vicodin [Hydrocodone-Acetaminophen] Hives      Social History     Tobacco Use     Smoking status: Never Smoker     Smokeless tobacco: Former User     Types: Chew     Tobacco comment: Gum, ocasionally    Substance Use Topics     Alcohol use: Yes     Comment: none at all now       Wt Readings from Last 1 Encounters:   22 102.5 kg (226 lb)        Anesthesia Evaluation            ROS/MED HX  ENT/Pulmonary: Comment: Chewing tobacco    (+) tobacco use,  (-) sleep apnea   Neurologic:     (+) peripheral neuropathy, - Right upper extremity/TOS.     Cardiovascular: Comment: ASD repaired at age 5    (+) Dyslipidemia -----    METS/Exercise Tolerance:     Hematologic:     (+) History of blood clots,     Musculoskeletal:       GI/Hepatic:    (-) GERD   Renal/Genitourinary:       Endo:       Psychiatric/Substance Use:     (+) psychiatric history bipolar, anxiety and depression     Infectious Disease:       Malignancy:       Other:            Physical Exam    Airway         Mallampati: II   TM distance: > 3 FB   Neck ROM: full   Mouth opening: > 3 cm    Respiratory Devices and Support         Dental       (+) partials    B=Bridge, C=Chipped, L=Loose, M=Missing    Cardiovascular   cardiovascular exam normal          Pulmonary   pulmonary exam normal                OUTSIDE LABS:  CBC:   Lab Results   Component Value Date    WBC 8.4 10/08/2021    WBC 5.1 10/07/2021    HGB 13.9 02/23/2022    HGB 12.3 (L) 10/08/2021    HCT 36.1 (L) 10/08/2021    HCT 35.9 (L) 10/07/2021     10/08/2021     (L) 10/07/2021     BMP:   Lab Results   Component Value Date     10/06/2021     10/05/2021    POTASSIUM 4.0 10/06/2021    POTASSIUM 4.1 10/05/2021    CHLORIDE 109 10/06/2021    CHLORIDE 106 10/05/2021    CO2 25 10/06/2021    CO2 24 10/05/2021    BUN 18 10/06/2021    BUN 13 10/05/2021    CR 1.00 10/08/2021    CR 0.95 10/07/2021     (H) 10/08/2021     (H) 10/08/2021     COAGS:   Lab Results   Component Value Date    INR 1.03 10/05/2021     POC: No results found for: BGM, HCG, HCGS  HEPATIC:   Lab Results   Component Value Date    ALBUMIN 4.0 10/05/2021    PROTTOTAL 7.7 10/05/2021    ALT 31 10/05/2021    AST 26 10/05/2021    ALKPHOS 67 10/05/2021    BILITOTAL 0.5 10/05/2021     OTHER:   Lab Results   Component Value Date    LACT 1.5 10/05/2021    A1C 5.3 01/07/2022    NIYA 7.4 (L) 10/06/2021    TSH 1.35 02/27/2018    CRP 0.4 01/07/2022       Anesthesia Plan    ASA Status:  2   NPO Status:  NPO Appropriate    Anesthesia Type: General.     - Airway: ETT   Induction: Intravenous, Propofol.   Maintenance: Inhalation.   Techniques and Equipment:     - Airway: Video-Laryngoscope         Consents    Anesthesia Plan(s) and associated risks, benefits, and realistic alternatives discussed. Questions answered and patient/representative(s) expressed understanding.    - Discussed:     - Discussed with:  Patient, Spouse         Postoperative Care    Pain management: IV analgesics,  Oral pain medications.   PONV prophylaxis: Ondansetron (or other 5HT-3), Dexamethasone or Solumedrol     Comments:                Nakul Murphy MD

## 2022-02-23 NOTE — ANESTHESIA CARE TRANSFER NOTE
Patient: Pool Ontiveros    Procedure: Procedure(s):  RIGHT TRANSAXILLARY FIRST RIB RESECTION AND SCALENECTOMY       Diagnosis: TOS (thoracic outlet syndrome) [G54.0]  Diagnosis Additional Information: No value filed.    Anesthesia Type:   General     Note:    Oropharynx: oropharynx clear of all foreign objects  Level of Consciousness: awake  Oxygen Supplementation: face mask  Level of Supplemental Oxygen (L/min / FiO2): 6  Independent Airway: airway patency satisfactory and stable  Dentition: dentition unchanged  Vital Signs Stable: post-procedure vital signs reviewed and stable  Report to RN Given: handoff report given  Patient transferred to: PACU    Handoff Report: Identifed the Patient, Identified the Reponsible Provider, Reviewed the pertinent medical history, Discussed the surgical course, Reviewed Intra-OP anesthesia mangement and issues during anesthesia, Set expectations for post-procedure period and Allowed opportunity for questions and acknowledgement of understanding      Vitals:  Vitals Value Taken Time   /66 02/23/22 0930   Temp     Pulse 86 02/23/22 0932   Resp 15 02/23/22 0932   SpO2 100 % 02/23/22 0932   Vitals shown include unvalidated device data.    Electronically Signed By: JOHN Duke CRNA  February 23, 2022  9:33 AM   no

## 2022-02-23 NOTE — OR NURSING
Dr. Nickerson at bedside.  Pt comfortable and does not require pain medication at this time. No c/o nausea. VSS. Approved to transfer to Gen surg .

## 2022-02-24 VITALS
HEIGHT: 75 IN | DIASTOLIC BLOOD PRESSURE: 59 MMHG | SYSTOLIC BLOOD PRESSURE: 114 MMHG | TEMPERATURE: 97 F | OXYGEN SATURATION: 99 % | RESPIRATION RATE: 15 BRPM | WEIGHT: 226 LBS | HEART RATE: 52 BPM | BODY MASS INDEX: 28.1 KG/M2

## 2022-02-24 LAB — GLUCOSE BLDC GLUCOMTR-MCNC: 109 MG/DL (ref 70–99)

## 2022-02-24 PROCEDURE — 250N000011 HC RX IP 250 OP 636: Performed by: SURGERY

## 2022-02-24 PROCEDURE — 250N000013 HC RX MED GY IP 250 OP 250 PS 637: Performed by: SURGERY

## 2022-02-24 RX ORDER — AMOXICILLIN 250 MG
1 CAPSULE ORAL 2 TIMES DAILY PRN
Qty: 20 TABLET | Refills: 0 | Status: SHIPPED | OUTPATIENT
Start: 2022-02-24 | End: 2023-10-25

## 2022-02-24 RX ORDER — IBUPROFEN 400 MG/1
400 TABLET, FILM COATED ORAL EVERY 6 HOURS PRN
COMMUNITY
Start: 2022-02-24 | End: 2022-09-08

## 2022-02-24 RX ADMIN — KETOROLAC TROMETHAMINE 15 MG: 15 INJECTION, SOLUTION INTRAMUSCULAR; INTRAVENOUS at 09:47

## 2022-02-24 RX ADMIN — KETOROLAC TROMETHAMINE 15 MG: 15 INJECTION, SOLUTION INTRAMUSCULAR; INTRAVENOUS at 03:16

## 2022-02-24 RX ADMIN — FENOFIBRATE 160 MG: 160 TABLET ORAL at 07:50

## 2022-02-24 RX ADMIN — ACETAMINOPHEN 650 MG: 325 TABLET, FILM COATED ORAL at 00:18

## 2022-02-24 RX ADMIN — SERTRALINE HYDROCHLORIDE 150 MG: 50 TABLET ORAL at 07:51

## 2022-02-24 RX ADMIN — CYCLOBENZAPRINE 10 MG: 10 TABLET, FILM COATED ORAL at 07:50

## 2022-02-24 RX ADMIN — GABAPENTIN 200 MG: 100 CAPSULE ORAL at 07:50

## 2022-02-24 RX ADMIN — ACETAMINOPHEN 650 MG: 325 TABLET, FILM COATED ORAL at 06:36

## 2022-02-24 ASSESSMENT — ACTIVITIES OF DAILY LIVING (ADL)
ADLS_ACUITY_SCORE: 5

## 2022-02-24 NOTE — PROGRESS NOTES
Vascular Surgery Progress Note:  POD#1   Right TOS    S: Very comfortable through the night with just Tylenol and Toradol.  No breathing issues.  No numbness.       O: Minimal Syris JORGE LUIS output <20 mL/shift  Vitals:  BP  Min: 98/51  Max: 130/66  Temp  Av.7  F (36.5  C)  Min: 96.6  F (35.9  C)  Max: 99.5  F (37.5  C)  Pulse  Av.1  Min: 65  Max: 89  I/O last 3 completed shifts:  In: 2630 [P.O.:940; I.V.:1690]  Out: 1865 [Urine:1750; Drains:100; Blood:15]    Physical Exam: Quite comfortable.  Breathing easily.    Wd=A     JORGE LUIS removed          Assessment/Plan: Doing very well following right TOS surgery.    Home today .    Incentive spirometer     Home initially with aspirin.    Restart Xarelto in approximately 2 days per Dr. Jad Soto. MD Scot

## 2022-02-24 NOTE — PROGRESS NOTES
Pt. Stable for discharge. AVS printed and reviewed with patient upon discharge. discharge meds sent with patient.  Questions answered.  Wife to provide transportation.

## 2022-02-24 NOTE — DISCHARGE SUMMARY
Service Date: 02/24/2022  Discharge Date: 02/24/2022    HISTORY:  A 41-year-old patient has a significant history of vasogenic left thoracic outlet syndrome.  This was his nondominant arm, and he underwent surgical decompression of the thoracic outlet and multiple interventional radiology procedures to keep this subclavian vein patent with final good results.  He has been on Xarelto.  We were concerned about his dominant right arm and found significant compression of the subclavian vein with his arm at 90 and 180 degrees.  We felt he would benefit from a thoracic decompression surgery to help prevent DVT occurring on his dominant side.    HOSPITAL COURSE:  The patient was admitted, and under a general anesthetic he underwent a right transaxillary first rib resection/ scalenectomy.  Postoperative drain was left and he tolerated the procedure quite well.  Chest x-ray revealed near complete removal of the first rib as planned.    The patient did very well postoperatively and pain very well controlled with Tylenol and intravenous Toradol, not requiring any narcotics.  He did well with incentive spirometer.  No paresthesias or other complications.  Drain was able to be removed on the first postoperative day.    He is doing well and ready for discharge to home on POD#1    DISCHARGE MEDICATIONS:    1.  Crestor 40 mg daily.   2.  Fenofibrate 160 mg daily.  3.  Zoloft 100 mg 1.5 tablets daily.  4.  Tylenol 650 mg q. 4 hours p.r.n.  5.  Ibuprofen 400 mg p.o. q. 6 hours p.r.n.  6.  Aspirin 81 mg daily.  7.  Senna docusate p.r.n.    FINAL DIAGNOSES:  1.  Vasogenic right thoracic outlet syndrome.  a.  Status post right transaxillary first rib resection, scalenectomy.  2.  History of vasogenic left thoracic outlet syndrome with successful treatment.  3.  Hyperlipidemia, on statin.    PLAN:  The patient will be discharged to home.  He will work on his incentive spirometer.  Activity as tolerated.  Dr. Street had requested that he  remain on Xarelto 20 mg daily for an additional month.  Will call the patient, likely restart this in approximately two days and discontinue the aspirin.  Followup will be performed in 2 weeks via video.    Jovi Ellison MD        D: 2022   T: 2022   MT: FRENCH    Name:     CATERINA WALLACE  MRN:      [id number]        Account:      [id number]   :      1980           Service Date: 2022                                  Discharge Date: 2022     Document: [id number]

## 2022-02-24 NOTE — PLAN OF CARE
"Goal Outcome Evaluation:    POD 1 right transaxillary first rib resection and scalenectomy. Pt is A&O x4 VSS on RA. CMS intact. Pain managed with tylenol and toradol. Up independent. Right axillary dressing CDI. JORGE LUIS with serosanguineous output. Tolerating regular diet. Pt C/O voiding irregularities and feeling like his \"flow\" is different. Pt is voiding, however some residual noted with bladder scanner. Discharge pending.             "

## 2022-02-24 NOTE — PLAN OF CARE
Pt arrived from PACU at 1030. VSS on RA. CMS intact. Dressing CDI. JORGE LUIS x1 in place. Tolerating regular diet, no N/V. Up walking in [employer]raft with spouse independently. Scheduled tylenol, flexeril, and toradol given for pain. Adequate I/O. Continue to monitor.

## 2022-02-25 ENCOUNTER — TELEPHONE (OUTPATIENT)
Dept: OTHER | Facility: CLINIC | Age: 42
End: 2022-02-25
Payer: COMMERCIAL

## 2022-02-25 ENCOUNTER — PATIENT OUTREACH (OUTPATIENT)
Dept: FAMILY MEDICINE | Facility: CLINIC | Age: 42
End: 2022-02-25
Payer: COMMERCIAL

## 2022-02-25 NOTE — TELEPHONE ENCOUNTER
What type of discharge? Inpatient  Risk of Hospital admission or ED visit: 13%  Is a TCM episode required? No  When should the patient follow up with PCP? within 30 days of discharge.    Joy Pedersen RN  [facility]

## 2022-02-25 NOTE — TELEPHONE ENCOUNTER
[place] VASCULAR [facility]    I called Pool DAMON Exsted about his TOS surgery.  He can restart his Xarelto tonight (stop ASA).     Jovi Ellison MD

## 2022-02-25 NOTE — TELEPHONE ENCOUNTER
Patient Contact    Attempt # 1    Was call answered?  No.  Left message on voicemail with information to call triage back.    On call back: Needs hospital follow up    Cecelia MILLER RN  EP Triage      -

## 2022-03-09 NOTE — PROGRESS NOTES
[place] VASCULAR [facility]    Pool Ontiveros returns for follow-up.  History of neurogenic left TOS with DVT.  Treated with lytic therapy and venoplasty.  Left transaxillary first rib resection scalenectomy 10/7/2021.  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  Continue with Xarelto.  Several venous interventions that were eventually successful.  Some issues with delayed axillary wound healing but did resolve.      We were concerned about his dominant right arm.  Significant venous compression noted with his arm elevated and can quickly occluded at 180 degrees.  Right transaxillary first rib resection scalenectomy performed 2/23/2022.  JORGE LUIS removed first postoperative day.  Recommended continuing Xarelto for approximately 2 months postoperatively.    VIDEO FOLLOW-up: I visited with Zain today via [employer].  He is actually doing very well following surgery with minimal discomfort.  He does lift his right arm up to 180 degrees he does have some pulling in his back and chest wall anteriorly which is expected from the surgery.  No swelling or other issues.  No numbness.    Dr. Street wanted to keep him on the Xarelto for 2 months following this surgery.  We will thus perform a bilateral venous duplex ultrasound with maneuvers at this time and also a C-reactive protein which he requested before stopping the Xarelto.    He has an upcoming trip in a week to [facility].  He wanted know if he can go in a hot tub but this is fine.  Also whether he can golf and that is also fine as long as he is comfortable doing so.      Spent 5 minutes of the video call today he completed it 1410 hrs.       Jovi Ellison MD

## 2022-03-10 ENCOUNTER — VIRTUAL VISIT (OUTPATIENT)
Dept: OTHER | Facility: CLINIC | Age: 42
End: 2022-03-10
Attending: SURGERY
Payer: COMMERCIAL

## 2022-03-10 DIAGNOSIS — G54.0 TOS (THORACIC OUTLET SYNDROME): Primary | ICD-10-CM

## 2022-03-10 PROCEDURE — 99024 POSTOP FOLLOW-UP VISIT: CPT | Performed by: SURGERY

## 2022-03-10 NOTE — PROGRESS NOTES
Zain is a 41 year old who is being evaluated via a billable video visit.      How would you like to obtain your AVS? MyChart  If the video visit is dropped, the invitation should be resent by: Text to cell phone: [phone]  Will anyone else be joining your video visit? No      Sasha Reed MA

## 2022-03-28 DIAGNOSIS — I82.629 ACUTE DEEP VEIN THROMBOSIS (DVT) OF OTHER VEIN OF UPPER EXTREMITY, UNSPECIFIED LATERALITY (H): ICD-10-CM

## 2022-03-28 RX ORDER — RIVAROXABAN 20 MG/1
TABLET, FILM COATED ORAL
Qty: 30 TABLET | Refills: 3 | Status: SHIPPED | OUTPATIENT
Start: 2022-03-28 | End: 2022-09-08

## 2022-05-19 ENCOUNTER — LAB (OUTPATIENT)
Dept: LAB | Facility: CLINIC | Age: 42
End: 2022-05-19
Payer: COMMERCIAL

## 2022-05-19 DIAGNOSIS — G54.0 TOS (THORACIC OUTLET SYNDROME): ICD-10-CM

## 2022-05-19 DIAGNOSIS — I82.629 ACUTE DEEP VEIN THROMBOSIS (DVT) OF OTHER VEIN OF UPPER EXTREMITY, UNSPECIFIED LATERALITY (H): ICD-10-CM

## 2022-05-19 LAB — D DIMER PPP FEU-MCNC: 0.37 UG/ML FEU (ref 0–0.5)

## 2022-05-19 PROCEDURE — 36415 COLL VENOUS BLD VENIPUNCTURE: CPT

## 2022-05-19 PROCEDURE — 85379 FIBRIN DEGRADATION QUANT: CPT

## 2022-05-23 ENCOUNTER — TELEPHONE (OUTPATIENT)
Dept: OTHER | Facility: CLINIC | Age: 42
End: 2022-05-23

## 2022-05-23 ENCOUNTER — VIRTUAL VISIT (OUTPATIENT)
Dept: OTHER | Facility: CLINIC | Age: 42
End: 2022-05-23
Attending: INTERNAL MEDICINE
Payer: COMMERCIAL

## 2022-05-23 DIAGNOSIS — G54.0 TOS (THORACIC OUTLET SYNDROME): ICD-10-CM

## 2022-05-23 DIAGNOSIS — I82.629 ACUTE DEEP VEIN THROMBOSIS (DVT) OF OTHER VEIN OF UPPER EXTREMITY, UNSPECIFIED LATERALITY (H): ICD-10-CM

## 2022-05-23 DIAGNOSIS — E78.2 MIXED HYPERLIPIDEMIA: ICD-10-CM

## 2022-05-23 DIAGNOSIS — E78.5 HYPERLIPIDEMIA LDL GOAL <100: ICD-10-CM

## 2022-05-23 PROCEDURE — 99213 OFFICE O/P EST LOW 20 MIN: CPT | Mod: 95 | Performed by: INTERNAL MEDICINE

## 2022-05-23 RX ORDER — MIRTAZAPINE 15 MG/1
TABLET, FILM COATED ORAL
COMMUNITY
Start: 2022-05-11

## 2022-05-23 RX ORDER — ATOMOXETINE 18 MG/1
100 CAPSULE ORAL
COMMUNITY
Start: 2022-05-11 | End: 2023-10-25

## 2022-05-23 NOTE — PROGRESS NOTES
Pool Ontiveros is a 42 year old male who is presenting at the current time to discuss his diagnosi(es) of        TOS (thoracic outlet syndrome)  Acute deep vein thrombosis (DVT) of other vein of upper extremity, unspecified laterality (H)  Hyperlipidemia LDL goal <100  Mixed hyperlipidemia  .             HPI:  Pool Ontiveros is a very pleasant 41 year old male with a history of hyperlipidemia and bipolar disorder well managed  with sertraline.  For most of his adult life he has lifted weights.  He took a 6 months break from lifting, but then had started back up.  When he started back up he felt his left arm became tight. He presented to the ED where he was noted to to have an occlusive left subclavian and axillary thrombosis as well as superficial left cephalic vein thrombosis.  He was started on IV heparin and taken to the Interventional Radiology suite for catheter-directed lysis and mechanical thrombectomy. Exam was significant for thoracic outlet syndrome and Dr. Ellison took him to the OR for left first rib resection and scalenectomy on 10/7/21. He was sent home on Xarelto 15 mg BID.      He returned for a venogram on 10/25/21 where the left subclavian vein was occluded and there were large collaterals explaining his minimal swelling.  Dr. Zamarripa was able to easily cannulate the subclavian vein.  Sequential angioplasty was performed up to a 10 mm balloon with good results and good flow.  Minimal irregularity noted on the vein.      On 11/3/21 he called with significant drainage from incision site. He came in and was noted to have unusual medial breakdown of incision.  It was suspected that he may have had an underlying seroma in the setting of primarily serosanguineous fluid. Area was debrided and reapproximated with nylon sutures.     Repeat venogram on 2/21/22 with patent veins. He stopped Xarelto on his own. D dimer one month later was normal.       Review Of Systems  Skin: negative  Eyes:  negative  Ears/Nose/Throat: negative  Respiratory: No shortness of breath, dyspnea on exertion, cough, or hemoptysis  Cardiovascular: negative  Gastrointestinal: negative  Genitourinary: negative  Musculoskeletal: negative  Neurologic: negative  Psychiatric: negative  Hematologic/Lymphatic/Immunologic: negative  Endocrine: negative      PAST MEDICAL HISTORY:                  Past Medical History:   Diagnosis Date     Depression with anxiety 7/19/2016     Hernia, abdominal      History of atrial septal defect repair 3/5/2012    Overview:  repaired age 5      History of inguinal hernia repair 3/5/2012    Overview:  Age 6      Mixed hyperlipidemia 7/19/2016       PAST SURGICAL HISTORY:                  Past Surgical History:   Procedure Laterality Date     HERNIA REPAIR       IR ANGIOGRAM THROUGH CATHETER FOLLOW UP  10/6/2021     IR UPPER EXTREMITY VENOGRAM LEFT  10/5/2021     IR UPPER EXTREMITY VENOGRAM LEFT  10/25/2021     IR UPPER EXTREMITY VENOGRAM LEFT  11/12/2021     TRANSAXILLARY RESECT FIRST RIB Left 10/7/2021    Procedure: LEFT TRANSAXILLARY FIRST RIB RESECTION AND SCALENECTOMY.;  Surgeon: Jovi Ellison;  Location:  OR     TRANSAXILLARY RESECT FIRST RIB Right 2/23/2022    Procedure: RIGHT TRANSAXILLARY FIRST RIB RESECTION AND SCALENECTOMY;  Surgeon: Jovi Ellison;  Location:  OR       CURRENT MEDICATIONS:                  Current Outpatient Medications   Medication Sig Dispense Refill     acetaminophen (TYLENOL) 325 MG tablet Take 500 mg by mouth once       aspirin (ASA) 81 MG EC tablet Take 1 tablet (81 mg) by mouth daily       atomoxetine (STRATTERA) 18 MG capsule TAKE 1 CAPSULE BY MOUTH EVERY DAY       fenofibrate (TRIGLIDE/LOFIBRA) 160 MG tablet Take 1 tablet (160 mg) by mouth daily 90 tablet 3     ibuprofen (ADVIL/MOTRIN) 400 MG tablet Take 1 tablet (400 mg) by mouth every 6 hours as needed for moderate pain       mirtazapine (REMERON) 15 MG tablet TAKE 0.5-1 TABLET BY MOUTH AT  BEDTIME       rosuvastatin (CRESTOR) 40 MG tablet Take 1 tablet (40 mg) by mouth daily 90 tablet 3     senna-docusate (SENOKOT-S/PERICOLACE) 8.6-50 MG tablet Take 1 tablet by mouth 2 times daily as needed for constipation 20 tablet 0     sertraline (ZOLOFT) 100 MG tablet Take 1.5 tablets (150 mg) by mouth daily 135 tablet 3     XARELTO ANTICOAGULANT 20 MG TABS tablet TAKE 1 TABLET BY MOUTH DAILY WITH DINNER 30 tablet 3       ALLERGIES:                  Allergies   Allergen Reactions     Vicodin [Hydrocodone-Acetaminophen] Hives       SOCIAL HISTORY:                  Social History     Socioeconomic History     Marital status:      Spouse name: Not on file     Number of children: Not on file     Years of education: Not on file     Highest education level: Not on file   Occupational History     Not on file   Tobacco Use     Smoking status: Never Smoker     Smokeless tobacco: Former User     Types: Chew     Quit date: 1/1/2017     Tobacco comment: Gum, ocasionally    Vaping Use     Vaping Use: Never used   Substance and Sexual Activity     Alcohol use: Yes     Comment: none at all now      Drug use: No     Sexual activity: Yes     Partners: Female   Other Topics Concern     Parent/sibling w/ CABG, MI or angioplasty before 65F 55M? Not Asked   Social History Narrative     Not on file     Social Determinants of Health     Financial Resource Strain: Not on file   Food Insecurity: Not on file   Transportation Needs: Not on file   Physical Activity: Not on file   Stress: Not on file   Social Connections: Not on file   Intimate Partner Violence: Not on file   Housing Stability: Not on file       FAMILY HISTORY:                   Family History   Problem Relation Age of Onset     Bipolar Disorder Mother      Depression Mother              Physical exam was not performed as it was a billable telephone encounter.    Component      Latest Ref Rng & Units 1/7/2022 5/19/2022   Lipoprotein (a)      <=29 mg/dL <6     C-Reactive Protein High Sensitivity      mg/L 0.4    Hemoglobin A1C      0.0 - 5.6 % 5.3    D-Dimer Quantitative      0.00 - 0.50 ug/mL FEU  0.37   LDL-C:                                                   51  LDL-P:                                                 927      DATE: 2/21/2022     EXAM: RIGHT UPPER EXTREMITY ARTERIAL ULTRASOUND WITH PROVOCATIVE  MANEUVERING     INDICATION: Upper extremity pain/swelling, history of left first rib  resection for suspected thoracic outlet syndrome     TECHNIQUE: Duplex imaging was performed utilizing gray-scale,  compression, augmentation as appropriate, color-flow, Doppler waveform  analysis, and spectral Doppler imaging. Provocative maneuvering was  performed with the arm in the neutral position, arm 90 degrees, arm  180 degrees,  press,  press head turned right,   press head turned left     COMPARISON: No pertinent comparison study is available for review.     FINDINGS:   RIGHT: Patent right subclavian artery in the neutral position with  normal triphasic waveforms. No significant change in artery waveforms  or velocities with provocative maneuvering.                                                                      IMPRESSION:   1. Patent right subclavian artery with normal triphasic flow. No  significant change in waveforms or velocities with provocative  maneuvering.     LESLIE DOMINGUEZ MD         SYSTEM ID:  [id number]    EXAM: BILATERAL UPPER EXTREMITY VENOUS ULTRASOUND WITH PROVOCATIVE  MANEUVERING     INDICATION: Upper extremity pain/swelling, history of left first rib  resection for suspected thoracic outlet syndrome     TECHNIQUE: Duplex imaging was performed utilizing gray-scale,  compression, augmentation as appropriate, color-flow, Doppler waveform  analysis, and spectral Doppler imaging. Provocative maneuvering was  performed with the arm in the neutral position, arm 90 degrees, arm  180 degrees,  press,  press head  turned right,   press head turned left     COMPARISON: No pertinent comparison study is available for review.     FINDINGS:   RIGHT: Patent right subclavian vein in the neutral positioning with  expected phasic waveforms. There is loss of phasic flow and elevation  in velocities with the arm at 90 degrees. No flow in the right  subclavian vein with the arm at 180 degrees. The right subclavian vein  is patent in the  press,  press head right, and   press head left positions.     LEFT: The left subclavian vein with phasic flow. No significant change  in waveforms or velocities with arm positioning.                                                                      IMPRESSION:   1. RIGHT: Loss of subclavian vein phasic flow and elevation in  velocities with the arm at 90 degrees. No flow in the subclavian vein  with the arm at 180 degrees.  2. LEFT: Patent left subclavian vein with no significant change in  waveforms or velocities after provocative maneuvering.     LESLIE DOMINGUEZ MD              1.  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 D dimer is normal.   -Disontinued Xarelto on his own, the d dimer was drawn after he had been off of it for  one month. Recheck d again in another month, if d dimer increases dramatically, consider resumption of AC. RTC one week later          2.  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       -His triglycerides have been greater than 500.  His direct LDL was 125.    -Started on Crestor 20 mg daily and Fenofibrate 160 mg daily in 10/2021.   -Advanced lipid testing presently reveals LDL-C and LDL-P at goal. Continue meds without change, check lipids in one year   through PCP.                          20 minutes medical care on today's date.

## 2022-05-23 NOTE — PROGRESS NOTES
Zain is a 42 year old who is being evaluated via a billable telephone visit.      What phone number would you like to be contacted at?  [phone]  How would you like to obtain your AVS? Guadalupe Reed MA

## 2022-05-23 NOTE — TELEPHONE ENCOUNTER
Follow-up to 5/23/22 (Virtual visit)      D-dimer (non-fasting lab)  On 6/9/22 (seeing Dr. Ellison that day)    Virtual visit with Dr. Street a few days later.

## 2022-05-24 NOTE — TELEPHONE ENCOUNTER
Future Appointments   Date Time Provider Department Center   6/9/2022  1:15 PM CS LAB [facility]    6/9/2022  1:45 PM VUS1 [facility]   6/9/2022  2:45 PM Jovi Ellison [facility]   6/15/2022  9:40 AM Tony Street MD [facility]     Patient is requesting that the results of his lab be communicated by mychart or by voicemail/informal call instead of a formal visit. Please advise and I will communicate response to patient.

## 2022-05-24 NOTE — TELEPHONE ENCOUNTER
See below.  Dr. Street wants patient scheduled for follow up visit.    Minoo VILLAGOMEZN, RN    [facility]  [facility]  Office: [phone]  Fax: [phone]

## 2022-05-24 NOTE — TELEPHONE ENCOUNTER
Only if normal. If abnormal will need a f/u visit. Please arrange f/u visit. He can remain off of xarleto and cancel the visit if his d dimer remains normal.

## 2022-06-08 NOTE — PROGRESS NOTES
[facility]    Pool Ontiveros has a history of vasogenic left TOS with DVTs.  Underwent left transaxillary first reviewed resection scalenectomy with mobilization left subclavian vein on 10/7/2021 after undergoing lytic therapy and venoplasty with evidence of extensive extrinsic compression.  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  Treated with Xarelto.  Left arm venogram 11/12/2021 with widely patent left subclavian vein and no evidence of stenosis or occlusion.  Discontinued Xarelto February 2022 2/23/2022 right transaxillary first rib resection scalenectomy due to complete occlusion of the right subclavian vein with his arm at 90 and 180 degrees.  This was performed prophylactically to his prior history of extensive left DVT.  Did well following the surgery.    PRESENT SITUATION: Overall doing well.  Very active including working out.  No arm swelling.  Occasional tingling to his right arm with activity.  He decided to stop his Xarelto in February.  Not taking aspirin.    Exam: Alert and appropriate.  Normal affect.   Blood pressure 124/84 left arm.  Pulse 83.   Well-healing bilateral axillar incisions.  No arm swelling.   Normal CMS.    Had D-dimer drawn this morning with results pending.    Bilateral venous duplex performed with TOS maneuvers.  Very unusual findings due to the smaller size of the right subclavian vein is a neutral position but larger at 90 degrees and 180 degrees with the latter being no flow in the past but now patent.  Significant decrease with the  press maneuvers which was normal preprocedure.  On the left side all diameters are diminished where they were relatively normal on the last ultrasound following the surgery on 2/21/2000      IMPRESSION: Both subclavian veins are patent particular left.  However, unusual size diameter differences which I cannot explain and this was discussed with patient.  He  remains asymptomatic and thus performing a venogram of her other testing besides the ultrasound would not be beneficial.  I do think it is reasonable to the smaller size veins in his history to place him on an adult aspirin daily and he will initiate this.    Due to the marked discrepancy in the expected venous duplex diameters of the subclavian vein we will repeat these testings again in 3 months with both arms in a neutral-90 degree - 108 degree position.      20 minutes with patient today discussing these issues.      Jovi Ellison MD  This note was created using Dragon voice recognition software which may result in transcription errors.

## 2022-06-09 ENCOUNTER — LAB (OUTPATIENT)
Dept: LAB | Facility: CLINIC | Age: 42
End: 2022-06-09
Payer: COMMERCIAL

## 2022-06-09 ENCOUNTER — HOSPITAL ENCOUNTER (OUTPATIENT)
Dept: ULTRASOUND IMAGING | Facility: CLINIC | Age: 42
Discharge: HOME | End: 2022-06-09
Attending: SURGERY
Payer: COMMERCIAL

## 2022-06-09 ENCOUNTER — OFFICE VISIT (OUTPATIENT)
Dept: OTHER | Facility: CLINIC | Age: 42
End: 2022-06-09
Attending: SURGERY
Payer: COMMERCIAL

## 2022-06-09 VITALS — DIASTOLIC BLOOD PRESSURE: 84 MMHG | SYSTOLIC BLOOD PRESSURE: 124 MMHG | HEART RATE: 83 BPM

## 2022-06-09 DIAGNOSIS — I82.A12 ACUTE DEEP VEIN THROMBOSIS (DVT) OF AXILLARY VEIN OF LEFT UPPER EXTREMITY (H): ICD-10-CM

## 2022-06-09 DIAGNOSIS — G54.0 TOS (THORACIC OUTLET SYNDROME): Primary | ICD-10-CM

## 2022-06-09 DIAGNOSIS — G54.0 TOS (THORACIC OUTLET SYNDROME): ICD-10-CM

## 2022-06-09 DIAGNOSIS — I82.629 ACUTE DEEP VEIN THROMBOSIS (DVT) OF OTHER VEIN OF UPPER EXTREMITY, UNSPECIFIED LATERALITY (H): ICD-10-CM

## 2022-06-09 LAB — D DIMER PPP FEU-MCNC: 0.37 UG/ML FEU (ref 0–0.5)

## 2022-06-09 PROCEDURE — 99213 OFFICE O/P EST LOW 20 MIN: CPT | Performed by: SURGERY

## 2022-06-09 PROCEDURE — 93970 EXTREMITY STUDY: CPT

## 2022-06-09 PROCEDURE — 36415 COLL VENOUS BLD VENIPUNCTURE: CPT

## 2022-06-09 PROCEDURE — 85379 FIBRIN DEGRADATION QUANT: CPT

## 2022-06-09 PROCEDURE — G0463 HOSPITAL OUTPT CLINIC VISIT: HCPCS

## 2022-06-09 NOTE — PROGRESS NOTES
[facility]        Patient is here for a  follow up.     Pt is currently taking Statin.    /84 (BP Location: Left arm, Patient Position: Chair, Cuff Size: Adult Regular)   Pulse 83     The provider has been notified that the patient has no concerns.     Questions patient would like addressed today are: N/A.    Refills are needed: N/A    Has homecare services and agency name:  Neema Reed MA

## 2022-06-15 ENCOUNTER — VIRTUAL VISIT (OUTPATIENT)
Dept: OTHER | Facility: CLINIC | Age: 42
End: 2022-06-15
Attending: INTERNAL MEDICINE
Payer: COMMERCIAL

## 2022-06-15 DIAGNOSIS — E78.5 HYPERLIPIDEMIA LDL GOAL <100: ICD-10-CM

## 2022-06-15 DIAGNOSIS — G54.0 TOS (THORACIC OUTLET SYNDROME): Primary | ICD-10-CM

## 2022-06-15 PROCEDURE — 99213 OFFICE O/P EST LOW 20 MIN: CPT | Mod: 95 | Performed by: INTERNAL MEDICINE

## 2022-06-15 NOTE — PROGRESS NOTES
Pool Ontiveros is a 42 year old male who is presenting at the current time to discuss his diagnosi(es) of          TOS (thoracic outlet syndrome)  Hyperlipidemia LDL goal <100  .           HPI:  Pool Ontiveros is a very pleasant 41 year old male with a history of hyperlipidemia and bipolar disorder well managed  with sertraline.  For most of his adult life he has lifted weights.  He took a 6 months break from lifting, but then had started back up.  When he started back up he felt his left arm became tight. He presented to the ED where he was noted to to have an occlusive left subclavian and axillary thrombosis as well as superficial left cephalic vein thrombosis.  He was started on IV heparin and taken to the Interventional Radiology suite for catheter-directed lysis and mechanical thrombectomy. Exam was significant for thoracic outlet syndrome and Dr. Ellison took him to the OR for left first rib resection and scalenectomy on 10/7/21. He was sent home on Xarelto 15 mg BID.      He returned for a venogram on 10/25/21 where the left subclavian vein was occluded and there were large collaterals explaining his minimal swelling.  Dr. Zamarripa was able to easily cannulate the subclavian vein.  Sequential angioplasty was performed up to a 10 mm balloon with good results and good flow.  Minimal irregularity noted on the vein.      On 11/3/21 he called with significant drainage from incision site. He came in and was noted to have unusual medial breakdown of incision.  It was suspected that he may have had an underlying seroma in the setting of primarily serosanguineous fluid. Area was debrided and reapproximated with nylon sutures.     Repeat venogram on 2/21/22 with patent veins. He stopped Xarelto on his own. D dimer one month later was normal.                 Review Of Systems  Skin: negative  Eyes: negative  Ears/Nose/Throat: negative  Respiratory: No shortness of breath, dyspnea on exertion, cough, or  hemoptysis  Cardiovascular: negative  Gastrointestinal: negative  Genitourinary: negative  Musculoskeletal: negative  Neurologic: negative  Psychiatric: negative  Hematologic/Lymphatic/Immunologic: negative  Endocrine: negative           PAST MEDICAL HISTORY:                  Past Medical History        Past Medical History:   Diagnosis Date     Depression with anxiety 7/19/2016     Hernia, abdominal       History of atrial septal defect repair 3/5/2012     Overview:  repaired age 5      History of inguinal hernia repair 3/5/2012     Overview:  Age 6      Mixed hyperlipidemia 7/19/2016            PAST SURGICAL HISTORY:                  Past Surgical History         Past Surgical History:   Procedure Laterality Date     HERNIA REPAIR         IR ANGIOGRAM THROUGH CATHETER FOLLOW UP   10/6/2021     IR UPPER EXTREMITY VENOGRAM LEFT   10/5/2021     IR UPPER EXTREMITY VENOGRAM LEFT   10/25/2021     IR UPPER EXTREMITY VENOGRAM LEFT   11/12/2021     TRANSAXILLARY RESECT FIRST RIB Left 10/7/2021     Procedure: LEFT TRANSAXILLARY FIRST RIB RESECTION AND SCALENECTOMY.;  Surgeon: Jovi Ellison;  Location:  OR     TRANSAXILLARY RESECT FIRST RIB Right 2/23/2022     Procedure: RIGHT TRANSAXILLARY FIRST RIB RESECTION AND SCALENECTOMY;  Surgeon: Jovi Ellison;  Location:  OR            CURRENT MEDICATIONS:                  Current Outpatient Prescriptions          Current Outpatient Medications   Medication Sig Dispense Refill     acetaminophen (TYLENOL) 325 MG tablet Take 500 mg by mouth once         aspirin (ASA) 81 MG EC tablet Take 1 tablet (81 mg) by mouth daily         atomoxetine (STRATTERA) 18 MG capsule TAKE 1 CAPSULE BY MOUTH EVERY DAY         fenofibrate (TRIGLIDE/LOFIBRA) 160 MG tablet Take 1 tablet (160 mg) by mouth daily 90 tablet 3     ibuprofen (ADVIL/MOTRIN) 400 MG tablet Take 1 tablet (400 mg) by mouth every 6 hours as needed for moderate pain         mirtazapine (REMERON) 15 MG tablet TAKE  0.5-1 TABLET BY MOUTH AT BEDTIME         rosuvastatin (CRESTOR) 40 MG tablet Take 1 tablet (40 mg) by mouth daily 90 tablet 3     senna-docusate (SENOKOT-S/PERICOLACE) 8.6-50 MG tablet Take 1 tablet by mouth 2 times daily as needed for constipation 20 tablet 0     sertraline (ZOLOFT) 100 MG tablet Take 1.5 tablets (150 mg) by mouth daily 135 tablet 3     XARELTO ANTICOAGULANT 20 MG TABS tablet TAKE 1 TABLET BY MOUTH DAILY WITH DINNER 30 tablet 3            ALLERGIES:                       Allergies   Allergen Reactions     Vicodin [Hydrocodone-Acetaminophen] Hives         SOCIAL HISTORY:                  Social History   Social History            Socioeconomic History     Marital status:        Spouse name: Not on file     Number of children: Not on file     Years of education: Not on file     Highest education level: Not on file   Occupational History     Not on file   Tobacco Use     Smoking status: Never Smoker     Smokeless tobacco: Former User       Types: Chew       Quit date: 1/1/2017     Tobacco comment: Gum, ocasionally    Vaping Use     Vaping Use: Never used   Substance and Sexual Activity     Alcohol use: Yes       Comment: none at all now      Drug use: No     Sexual activity: Yes       Partners: Female   Other Topics Concern     Parent/sibling w/ CABG, MI or angioplasty before 65F 55M? Not Asked   Social History Narrative     Not on file      Social Determinants of Health      Financial Resource Strain: Not on file   Food Insecurity: Not on file   Transportation Needs: Not on file   Physical Activity: Not on file   Stress: Not on file   Social Connections: Not on file   Intimate Partner Violence: Not on file   Housing Stability: Not on file            FAMILY HISTORY:                   Family History         Family History   Problem Relation Age of Onset     Bipolar Disorder Mother       Depression Mother                       Physical exam was not performed as it was a billable telephone  encounter.        Component      Latest Ref Rng & Units 10/5/2021 5/19/2022 6/9/2022   D-Dimer Quantitative      0.00 - 0.50 ug/mL FEU 0.84 (H) 0.37 0.37       US UPPER EXTREMITY VENOUS DUPLEX BILATERAL  6/9/2022 2:12 PM      HISTORY: History of left first rib resection on 10/7/2021. Multiple  follow-up venograms. Right first rib resection on 2/23/2022. Evaluate  with maneuvers. TOS (thoracic outlet syndrome).     COMPARISON: 2/21/2022     FINDINGS: Color Doppler and spectral waveform analysis was performed  throughout the bilateral subclavian veins with the arm in neutral, 90  degrees, 180 degrees,  press,  press head turned right  and  press head turned left.     Right: There is loss of the normal phasicity of the right subclavian  vein with the arm at 90 and 180 degrees.     Left: Left subclavian vein is patent with all maneuvers.                                                                      IMPRESSION:  1. Loss of normal phasicity of the right subclavian vein with the arm  at 90 degrees and 180 degrees.  2. Left subclavian vein is patent with all maneuvers.     LAZ LAM,       DATE: 2/21/2022     EXAM: RIGHT UPPER EXTREMITY ARTERIAL ULTRASOUND WITH PROVOCATIVE  MANEUVERING     INDICATION: Upper extremity pain/swelling, history of left first rib  resection for suspected thoracic outlet syndrome     TECHNIQUE: Duplex imaging was performed utilizing gray-scale,  compression, augmentation as appropriate, color-flow, Doppler waveform  analysis, and spectral Doppler imaging. Provocative maneuvering was  performed with the arm in the neutral position, arm 90 degrees, arm  180 degrees,  press,  press head turned right,   press head turned left     COMPARISON: No pertinent comparison study is available for review.     FINDINGS:   RIGHT: Patent right subclavian artery in the neutral position with  normal triphasic waveforms. No significant change in artery  waveforms  or velocities with provocative maneuvering.                                                                      IMPRESSION:   1. Patent right subclavian artery with normal triphasic flow. No  significant change in waveforms or velocities with provocative  maneuvering.     LESLIE DOMINGUEZ MD         SYSTEM ID:  [id number]     EXAM: BILATERAL UPPER EXTREMITY VENOUS ULTRASOUND WITH PROVOCATIVE  MANEUVERING     INDICATION: Upper extremity pain/swelling, history of left first rib  resection for suspected thoracic outlet syndrome     TECHNIQUE: Duplex imaging was performed utilizing gray-scale,  compression, augmentation as appropriate, color-flow, Doppler waveform  analysis, and spectral Doppler imaging. Provocative maneuvering was  performed with the arm in the neutral position, arm 90 degrees, arm  180 degrees,  press,  press head turned right,   press head turned left     COMPARISON: No pertinent comparison study is available for review.     FINDINGS:   RIGHT: Patent right subclavian vein in the neutral positioning with  expected phasic waveforms. There is loss of phasic flow and elevation  in velocities with the arm at 90 degrees. No flow in the right  subclavian vein with the arm at 180 degrees. The right subclavian vein  is patent in the  press,  press head right, and   press head left positions.     LEFT: The left subclavian vein with phasic flow. No significant change  in waveforms or velocities with arm positioning.                                                                      IMPRESSION:   1. RIGHT: Loss of subclavian vein phasic flow and elevation in  velocities with the arm at 90 degrees. No flow in the subclavian vein  with the arm at 180 degrees.  2. LEFT: Patent left subclavian vein with no significant change in  waveforms or velocities after provocative maneuvering.     LESLIE DOMINGUEZ MD                      1.  Left upper extremity DVT  secondary to venous thoracic outlet syndrome s/p left first rib resection and scalenectomy 10/27/21       -Most recent venogram on 2/21/22 shows veins are patent. D dimer was normal.   -Disontinued Xarelto on his own, the d dimer was drawn after he had been off of it for  one month. Rechecked d dimer again 6/9/22, and it was normal.  -Stop checking d dimer. Stay off of AC.            2.  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       -His triglycerides have been greater than 500.  His direct LDL was 125.    -Started on Crestor 20 mg daily and Fenofibrate 160 mg daily in 10/2021.   -Advanced lipid testing presently reveals LDL-C and LDL-P at goal. Continue meds without change, check lipids in one year. RTC with me two weeks later.                      20 minutes total medical care on today's date.

## 2022-06-15 NOTE — PROGRESS NOTES
Zain is a 42 year old who is being evaluated via a billable telephone visit.      What phone number would you like to be contacted at? [phone]  How would you like to obtain your AVS? Guadalupe Reed MA

## 2022-09-08 ENCOUNTER — HOSPITAL ENCOUNTER (OUTPATIENT)
Dept: ULTRASOUND IMAGING | Facility: CLINIC | Age: 42
Discharge: HOME | End: 2022-09-08
Attending: SURGERY
Payer: COMMERCIAL

## 2022-09-08 ENCOUNTER — OFFICE VISIT (OUTPATIENT)
Dept: OTHER | Facility: CLINIC | Age: 42
End: 2022-09-08
Attending: SURGERY
Payer: COMMERCIAL

## 2022-09-08 VITALS
HEART RATE: 82 BPM | HEIGHT: 75 IN | WEIGHT: 225 LBS | DIASTOLIC BLOOD PRESSURE: 77 MMHG | SYSTOLIC BLOOD PRESSURE: 111 MMHG | BODY MASS INDEX: 27.98 KG/M2 | OXYGEN SATURATION: 100 %

## 2022-09-08 DIAGNOSIS — G54.0 TOS (THORACIC OUTLET SYNDROME): Primary | ICD-10-CM

## 2022-09-08 DIAGNOSIS — I82.722 ARM DVT (DEEP VENOUS THROMBOEMBOLISM), CHRONIC, LEFT (H): ICD-10-CM

## 2022-09-08 DIAGNOSIS — G54.0 TOS (THORACIC OUTLET SYNDROME): ICD-10-CM

## 2022-09-08 DIAGNOSIS — I82.A12 ACUTE DEEP VEIN THROMBOSIS (DVT) OF AXILLARY VEIN OF LEFT UPPER EXTREMITY (H): ICD-10-CM

## 2022-09-08 PROCEDURE — 99212 OFFICE O/P EST SF 10 MIN: CPT | Performed by: SURGERY

## 2022-09-08 PROCEDURE — 93970 EXTREMITY STUDY: CPT

## 2022-09-08 PROCEDURE — G0463 HOSPITAL OUTPT CLINIC VISIT: HCPCS

## 2022-09-08 NOTE — PROGRESS NOTES
"Patient is here to discuss follow up.    /77 (BP Location: Left arm, Patient Position: Chair, Cuff Size: Adult Regular)   Pulse 82   Ht 6' 3\" (1.905 m)   Wt 225 lb (102.1 kg)   SpO2 100%   BMI 28.12 kg/m      Questions patient would like addressed today are: N/A.    Refills are needed: N/A    Has homecare services and agency name:  Neema Curiel  "

## 2022-09-08 NOTE — PROGRESS NOTES
[facility]    Pool Ontiveros has a history of vasogenic left TOS with DVT.  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  On Xarelto.  11/12/2021 venogram revealed a widely patent subclavian vein.  Xarelto discontinued February 2022.  2/23/2022 right transaxillary first rib resection scalenectomy due to complete occlusion of the right subclavian vein with his arm elevated.    On follow-up 6/9/2022 the. subclavian veins are patent. However the diameter was much smaller than expected with the arm at neutral and 90 degrees on the right and 90 degrees and 180 degrees on the left and previous studies.  This was somewhat surprising from previous studies.  He was totally asymptomatic and comes for a follow-up duplex of his subclavian vein with maneuvers.    Zain has no complaints at all.  Full use of his arm with no swelling or discomfort.  Well-healed axillary incisions  Blood pressure 111/77 left arm.  Pulse 82 regular.    We performed venous duplex of both subclavian veins.  These have now returned to what we would expect at his baseline.  Left subclavian vein due to the thrombus and wall thickening is somewhat smaller than the right but both are more normal diameters compared to the study on 6/9/2022.          IMPRESSION: Patent right and left subclavian veins following surgery.  Left is somewhat smaller due to the effects of the DVT but still very adequate with no symptoms at all.  With his history I do think that long-term use of a children's aspirin would be appropriate and this was discussed with patient.  No specific follow-up with me is indicated.    Jovi Ellison MD  This note was created using Dragon voice recognition software which may result in transcription errors.

## 2022-10-16 ENCOUNTER — HEALTH MAINTENANCE LETTER (OUTPATIENT)
Age: 42
End: 2022-10-16

## 2023-01-26 DIAGNOSIS — E78.2 MIXED HYPERLIPIDEMIA: ICD-10-CM

## 2023-01-26 DIAGNOSIS — F41.8 DEPRESSION WITH ANXIETY: ICD-10-CM

## 2023-01-26 RX ORDER — ROSUVASTATIN CALCIUM 40 MG/1
TABLET, COATED ORAL
Qty: 90 TABLET | Refills: 1 | Status: SHIPPED | OUTPATIENT
Start: 2023-01-26 | End: 2023-06-23

## 2023-01-26 RX ORDER — SERTRALINE HYDROCHLORIDE 100 MG/1
TABLET, FILM COATED ORAL
Qty: 135 TABLET | Refills: 0 | Status: SHIPPED | OUTPATIENT
Start: 2023-01-26

## 2023-01-26 RX ORDER — FENOFIBRATE 160 MG/1
TABLET ORAL
Qty: 90 TABLET | Refills: 1 | Status: SHIPPED | OUTPATIENT
Start: 2023-01-26 | End: 2023-06-23

## 2023-01-26 NOTE — TELEPHONE ENCOUNTER
fenofibrate (TRIGLIDE/LOFIBRA) 160 MG tablet  Last Written Prescription Date:  1/28/22  Last Fill Quantity: 90,  # refills: 3    rosuvastatin (CRESTOR) 40 MG tablet  Last Written Prescription Date:  1/28/22  Last Fill Quantity: 90,  # refills: 3    Last office visit:  6/15/22  Follow up recommended:  June 2023    Unable to fill per [facility] protocol.  Medication and pharmacy loaded.

## 2023-03-15 RX ORDER — ATOMOXETINE 100 MG/1
CAPSULE ORAL
Qty: 90 CAPSULE | OUTPATIENT
Start: 2023-03-15

## 2023-06-23 DIAGNOSIS — E78.2 MIXED HYPERLIPIDEMIA: ICD-10-CM

## 2023-06-23 NOTE — TELEPHONE ENCOUNTER
fenofibrate (TRIGLIDE/LOFIBRA) 160 MG tablet  Last Written Prescription Date:  1/26/23  Last Fill Quantity: 90,  # refills: 1    rosuvastatin (CRESTOR) 40 MG tablet  Last Written Prescription Date:  1/23/23  Last Fill Quantity: 90,  # refills: 1    Last office visit:  6/15/22  Follow up recommended:  One year.    30 day refill loaded for review/signature.  Patient notified via [employer] that appointment is required for further refill.  Follow up orders updated in Epic.

## 2023-06-26 RX ORDER — ROSUVASTATIN CALCIUM 40 MG/1
TABLET, COATED ORAL
Qty: 30 TABLET | Refills: 0 | Status: SHIPPED | OUTPATIENT
Start: 2023-06-26 | End: 2023-10-02

## 2023-06-26 RX ORDER — FENOFIBRATE 160 MG/1
TABLET ORAL
Qty: 30 TABLET | Refills: 0 | Status: SHIPPED | OUTPATIENT
Start: 2023-06-26 | End: 2023-10-02

## 2023-08-21 DIAGNOSIS — E78.2 MIXED HYPERLIPIDEMIA: ICD-10-CM

## 2023-08-21 RX ORDER — ROSUVASTATIN CALCIUM 40 MG/1
TABLET, COATED ORAL
Qty: 30 TABLET | Refills: 0 | OUTPATIENT
Start: 2023-08-21

## 2023-08-21 RX ORDER — FENOFIBRATE 160 MG/1
TABLET ORAL
Qty: 30 TABLET | Refills: 0 | OUTPATIENT
Start: 2023-08-21

## 2023-08-21 NOTE — TELEPHONE ENCOUNTER
fenofibrate (TRIGLIDE/LOFIBRA) 160 MG tablet   Last Written Prescription Date:  6/26/23  Last Fill Quantity: 30,  # refills: 0    rosuvastatin (CRESTOR) 40 MG tablet   Last Written Prescription Date:  6/26/23  Last Fill Quantity: 30,  # refills: 0    Rx request denied - needs appointment.

## 2023-09-06 DIAGNOSIS — E78.2 MIXED HYPERLIPIDEMIA: ICD-10-CM

## 2023-09-07 RX ORDER — ROSUVASTATIN CALCIUM 40 MG/1
TABLET, COATED ORAL
Qty: 30 TABLET | Refills: 0 | OUTPATIENT
Start: 2023-09-07

## 2023-09-07 NOTE — TELEPHONE ENCOUNTER
rosuvastatin (CRESTOR) 40 MG tablet   Last Written Prescription Date:  6/26/23  Last Fill Quantity: 30,  # refills: 0      Denied - needs appointment.

## 2023-10-02 ENCOUNTER — TELEPHONE (OUTPATIENT)
Dept: OTHER | Facility: CLINIC | Age: 43
End: 2023-10-02
Payer: COMMERCIAL

## 2023-10-02 DIAGNOSIS — E78.2 MIXED HYPERLIPIDEMIA: ICD-10-CM

## 2023-10-02 RX ORDER — ROSUVASTATIN CALCIUM 40 MG/1
40 TABLET, COATED ORAL DAILY
Qty: 90 TABLET | Refills: 0 | Status: SHIPPED | OUTPATIENT
Start: 2023-10-02 | End: 2023-11-08

## 2023-10-02 RX ORDER — FENOFIBRATE 160 MG/1
160 TABLET ORAL DAILY
Qty: 90 TABLET | Refills: 0 | Status: SHIPPED | OUTPATIENT
Start: 2023-10-02 | End: 2023-11-08

## 2023-10-02 NOTE — TELEPHONE ENCOUNTER
[facility]    Who is the name of the provider?:  Jad    What is the location you see this provider at/preferred location?: [place]  Person calling / Facility: Zain Ontiveros  Phone number:   [phone]   Nurse call back needed:  yes     Reason for call:     Two requests:     Please extend lab orders as patient will have the labs drawn on his 10/25/23 visit with Dr. Bonilla.    Patient is scheduled to see Dr. Street on 11/8/23.  Patient has been off his prescriptions Fenofibrate and Rosuvastin for two weeks.  Should he stay off the medications or should they be refilled and he restart them.      Please advise patient.    Pharmacy location:     Outside Imaging:     Can we leave a detailed message on this number?  yes

## 2023-10-02 NOTE — TELEPHONE ENCOUNTER
1) labs extended    2) fenofibrate and rosuvastatin refills sent to [facility].    Notified patient of both of the above.  Patient was unsure if he should restart fenofibrate and rosuvastatin as he has been out of them for two weeks, or if he should just have his labs drawn and see how they come out.    I advised that Dr. Street usually checks the effects of cholesterol labs 2-3 months after changing medications.  I explained it is his choice whether to wait to restart the medications until after the lab draw; however, most people with high cholesterol are unable to achieve cholesterol goals by diet and exercise alone and he will likely need the medications.    He will have his labs drawn 10/25 and see Dr. Street on 11/8.

## 2023-10-25 ENCOUNTER — OFFICE VISIT (OUTPATIENT)
Dept: FAMILY MEDICINE | Facility: CLINIC | Age: 43
End: 2023-10-25
Payer: COMMERCIAL

## 2023-10-25 VITALS
SYSTOLIC BLOOD PRESSURE: 113 MMHG | BODY MASS INDEX: 26.36 KG/M2 | OXYGEN SATURATION: 100 % | HEART RATE: 78 BPM | RESPIRATION RATE: 16 BRPM | HEIGHT: 75 IN | TEMPERATURE: 98.7 F | DIASTOLIC BLOOD PRESSURE: 76 MMHG | WEIGHT: 212 LBS

## 2023-10-25 DIAGNOSIS — Z00.00 HEALTH MAINTENANCE EXAMINATION: Primary | ICD-10-CM

## 2023-10-25 DIAGNOSIS — E78.5 HYPERLIPIDEMIA LDL GOAL <100: ICD-10-CM

## 2023-10-25 PROBLEM — I82.629: Status: RESOLVED | Noted: 2021-10-05 | Resolved: 2023-10-25

## 2023-10-25 LAB
APO A-I SERPL-MCNC: <6 MG/DL
CHOLEST SERPL-MCNC: 134 MG/DL
CRP SERPL HS-MCNC: 0.48 MG/L
ERYTHROCYTE [DISTWIDTH] IN BLOOD BY AUTOMATED COUNT: 12.4 % (ref 10–15)
HCT VFR BLD AUTO: 45.6 % (ref 40–53)
HDLC SERPL-MCNC: 60 MG/DL
HGB BLD-MCNC: 14.8 G/DL (ref 13.3–17.7)
LDLC SERPL CALC-MCNC: 59 MG/DL
MCH RBC QN AUTO: 29.7 PG (ref 26.5–33)
MCHC RBC AUTO-ENTMCNC: 32.5 G/DL (ref 31.5–36.5)
MCV RBC AUTO: 92 FL (ref 78–100)
NONHDLC SERPL-MCNC: 74 MG/DL
PLATELET # BLD AUTO: 227 10E3/UL (ref 150–450)
RBC # BLD AUTO: 4.98 10E6/UL (ref 4.4–5.9)
TRIGL SERPL-MCNC: 77 MG/DL
WBC # BLD AUTO: 5 10E3/UL (ref 4–11)

## 2023-10-25 PROCEDURE — 99396 PREV VISIT EST AGE 40-64: CPT | Mod: 25 | Performed by: FAMILY MEDICINE

## 2023-10-25 PROCEDURE — 85027 COMPLETE CBC AUTOMATED: CPT | Performed by: FAMILY MEDICINE

## 2023-10-25 PROCEDURE — 83695 ASSAY OF LIPOPROTEIN(A): CPT | Performed by: FAMILY MEDICINE

## 2023-10-25 PROCEDURE — 83704 LIPOPROTEIN BLD QUAN PART: CPT | Mod: 90 | Performed by: FAMILY MEDICINE

## 2023-10-25 PROCEDURE — 36415 COLL VENOUS BLD VENIPUNCTURE: CPT | Performed by: FAMILY MEDICINE

## 2023-10-25 PROCEDURE — 90686 IIV4 VACC NO PRSV 0.5 ML IM: CPT | Performed by: FAMILY MEDICINE

## 2023-10-25 PROCEDURE — 90471 IMMUNIZATION ADMIN: CPT | Performed by: FAMILY MEDICINE

## 2023-10-25 PROCEDURE — 86141 C-REACTIVE PROTEIN HS: CPT | Performed by: FAMILY MEDICINE

## 2023-10-25 PROCEDURE — 80061 LIPID PANEL: CPT | Performed by: FAMILY MEDICINE

## 2023-10-25 PROCEDURE — 80053 COMPREHEN METABOLIC PANEL: CPT | Performed by: FAMILY MEDICINE

## 2023-10-25 PROCEDURE — 99000 SPECIMEN HANDLING OFFICE-LAB: CPT | Performed by: FAMILY MEDICINE

## 2023-10-25 RX ORDER — ATOMOXETINE 100 MG/1
100 CAPSULE ORAL DAILY
COMMUNITY
Start: 2023-10-14

## 2023-10-25 RX ORDER — ATOMOXETINE 40 MG/1
40 CAPSULE ORAL DAILY
COMMUNITY
Start: 2023-10-11

## 2023-10-25 ASSESSMENT — ENCOUNTER SYMPTOMS
HEMATURIA: 0
WEAKNESS: 0
PALPITATIONS: 0
NERVOUS/ANXIOUS: 1
ABDOMINAL PAIN: 0
MYALGIAS: 0
PARESTHESIAS: 0
DYSURIA: 0
SORE THROAT: 0
EYE PAIN: 0
DIARRHEA: 0
JOINT SWELLING: 0
NAUSEA: 0
HEARTBURN: 0
DIZZINESS: 0
HEADACHES: 0
ARTHRALGIAS: 0
CONSTIPATION: 1
HEMATOCHEZIA: 0
FREQUENCY: 0
FEVER: 0
CHILLS: 0
SHORTNESS OF BREATH: 0
COUGH: 0

## 2023-10-25 ASSESSMENT — PAIN SCALES - GENERAL: PAINLEVEL: NO PAIN (0)

## 2023-10-25 NOTE — PROGRESS NOTES
SUBJECTIVE:   CC: Zani is an 43 year old who presents for preventative health visit.       Healthy Habits:     Getting at least 3 servings of Calcium per day:  NO    Bi-annual eye exam:  NO    Dental care twice a year:  NO    Sleep apnea or symptoms of sleep apnea:  Daytime drowsiness    Diet:  Regular (no restrictions)    Frequency of exercise:  4-5 days/week    Duration of exercise:  15-30 minutes    Taking medications regularly:  Yes    Medication side effects:  Not applicable      Today's PHQ-2 Score:       10/25/2023     9:25 AM   PHQ-2 ( 1999 [employer])   Q1: Little interest or pleasure in doing things 0   Q2: Feeling down, depressed or hopeless 1   PHQ-2 Score 1   Q1: Little interest or pleasure in doing things Not at all   Q2: Feeling down, depressed or hopeless Several days   PHQ-2 Score 1       Social History     Tobacco Use    Smoking status: Never    Smokeless tobacco: Former     Types: Chew     Quit date: 1/1/2017    Tobacco comments:     Gum, ocasionally    Substance Use Topics    Alcohol use: Not Currently     Comment: none at all now              10/25/2023     9:25 AM   Alcohol Use   Prescreen: >3 drinks/day or >7 drinks/week? Not Applicable         7/28/2021     8:11 AM   AUDIT - Alcohol Use Disorders Identification Test - Reproduced from the World Health Organization Audit 2001 (Second Edition)   1.  How often do you have a drink containing alcohol? 2 to 3 times a week   2.  How many drinks containing alcohol do you have on a typical day when you are drinking? 3 or 4   3.  How often do you have five or more drinks on one occasion? Less than monthly   4.  How often during the last year have you found that you were not able to stop drinking once you had started? Never   5.  How often during the last year have you failed to do what was normally expected of you because of drinking? Never   6.  How often during the last year have you needed a first drink in the morning to get yourself going after a heavy  "drinking session? Less than monthly   7.  How often during the last year have you had a feeling of guilt or remorse after drinking? Less than monthly   8.  How often during the last year have you been unable to remember what happened the night before because of your drinking? Never   9.  Have you or someone else been injured because of your drinking? No   10. Has a relative, friend, doctor or other health care worker been concerned about your drinking or suggested you cut down? No   TOTAL SCORE 7       Last PSA: No results found for: \"PSA\"    Reviewed orders with patient. Reviewed health maintenance and updated orders accordingly - Yes  BP Readings from Last 3 Encounters:   10/25/23 113/76   09/08/22 111/77   06/09/22 124/84    Wt Readings from Last 3 Encounters:   10/25/23 96.2 kg (212 lb)   09/08/22 102.1 kg (225 lb)   02/23/22 102.5 kg (226 lb)                  Patient Active Problem List   Diagnosis    History of inguinal hernia repair    History of atrial septal defect repair    Mixed hyperlipidemia    Depression with anxiety    TOS (thoracic outlet syndrome)     Past Surgical History:   Procedure Laterality Date    HERNIA REPAIR      IR ANGIOGRAM THROUGH CATHETER FOLLOW UP  10/6/2021    IR UPPER EXTREMITY VENOGRAM LEFT  10/5/2021    IR UPPER EXTREMITY VENOGRAM LEFT  10/25/2021    IR UPPER EXTREMITY VENOGRAM LEFT  11/12/2021    TRANSAXILLARY RESECT FIRST RIB Left 10/7/2021    Procedure: LEFT TRANSAXILLARY FIRST RIB RESECTION AND SCALENECTOMY.;  Surgeon: Jovi Ellison;  Location:  OR    TRANSAXILLARY RESECT FIRST RIB Right 2/23/2022    Procedure: RIGHT TRANSAXILLARY FIRST RIB RESECTION AND SCALENECTOMY;  Surgeon: Jovi Ellison;  Location:  OR       Social History     Tobacco Use    Smoking status: Never    Smokeless tobacco: Current     Types: Chew     Last attempt to quit: 1/1/2017    Tobacco comments:     Gum, ocasionally    Substance Use Topics    Alcohol use: Not Currently     Comment: " none at all now      Family History   Problem Relation Age of Onset    Bipolar Disorder Mother     Depression Mother          Current Outpatient Medications   Medication Sig Dispense Refill    atomoxetine (STRATTERA) 100 MG capsule Take 100 mg by mouth daily      atomoxetine (STRATTERA) 40 MG capsule Take 40 mg by mouth daily      fenofibrate (TRIGLIDE/LOFIBRA) 160 MG tablet Take 1 tablet (160 mg) by mouth daily 90 tablet 0    mirtazapine (REMERON) 15 MG tablet TAKE 0.5-1 TABLET BY MOUTH AT BEDTIME      rosuvastatin (CRESTOR) 40 MG tablet Take 1 tablet (40 mg) by mouth daily 90 tablet 0    sertraline (ZOLOFT) 100 MG tablet TAKE 1.5 TABLETS BY MOUTH DAILY 135 tablet 0     Allergies   Allergen Reactions    Vicodin [Hydrocodone-Acetaminophen] Hives     Recent Labs   Lab Test 01/07/22  0749 10/08/21  0848 10/07/21  0501 10/06/21  0505 10/05/21  1237 07/28/21  0849 07/28/21  0849 05/03/19  1103 04/22/18  0000 02/27/18  1039   A1C 5.3  --   --   --   --   --   --   --   --   --    LDL  --   --   --   --   --   --  125* 100*  --  81   HDL  --   --   --   --   --   --  50 49  --  47   TRIG  --   --   --   --   --   --  521* 235*  --  273*   ALT  --   --   --   --  31  --  32 26  --  24   CR  --  1.00 0.95 1.06 1.06   < > 1.02 1.09   < > 1.04   GFRESTIMATED  --  >90 >90 87 87   < > >90 86   < > 80   GFRESTBLACK  --   --   --   --   --   --   --  >90  --  >90   POTASSIUM  --   --   --  4.0 4.1   < > 4.0 3.8   < > 4.3   TSH  --   --   --   --   --   --   --   --   --  1.35    < > = values in this interval not displayed.        Reviewed and updated as needed this visit by clinical staff   Tobacco   Meds              Reviewed and updated as needed this visit by Provider                 Past Medical History:   Diagnosis Date    Depression with anxiety 7/19/2016    Hernia, abdominal     History of atrial septal defect repair 3/5/2012    Overview:  repaired age 5     History of inguinal hernia repair 3/5/2012    Overview:  Age 6   "   Mixed hyperlipidemia 7/19/2016      Past Surgical History:   Procedure Laterality Date    HERNIA REPAIR      IR ANGIOGRAM THROUGH CATHETER FOLLOW UP  10/6/2021    IR UPPER EXTREMITY VENOGRAM LEFT  10/5/2021    IR UPPER EXTREMITY VENOGRAM LEFT  10/25/2021    IR UPPER EXTREMITY VENOGRAM LEFT  11/12/2021    TRANSAXILLARY RESECT FIRST RIB Left 10/7/2021    Procedure: LEFT TRANSAXILLARY FIRST RIB RESECTION AND SCALENECTOMY.;  Surgeon: Jovi Ellison;  Location:  OR    TRANSAXILLARY RESECT FIRST RIB Right 2/23/2022    Procedure: RIGHT TRANSAXILLARY FIRST RIB RESECTION AND SCALENECTOMY;  Surgeon: Jovi Ellison;  Location:  OR       Review of Systems   Constitutional:  Negative for chills and fever.   HENT:  Negative for congestion, ear pain, hearing loss and sore throat.    Eyes:  Negative for pain and visual disturbance.   Respiratory:  Negative for cough and shortness of breath.    Cardiovascular:  Negative for chest pain, palpitations and peripheral edema.   Gastrointestinal:  Positive for constipation. Negative for abdominal pain, diarrhea, heartburn, hematochezia and nausea.   Genitourinary:  Negative for dysuria, frequency, genital sores, hematuria, impotence, penile discharge and urgency.   Musculoskeletal:  Negative for arthralgias, joint swelling and myalgias.   Skin:  Negative for rash.   Neurological:  Negative for dizziness, weakness, headaches and paresthesias.   Psychiatric/Behavioral:  Negative for mood changes. The patient is nervous/anxious.          OBJECTIVE:   /76 (BP Location: Right arm, Patient Position: Sitting, Cuff Size: Adult Large)   Pulse 78   Temp 98.7  F (37.1  C) (Temporal)   Resp 16   Ht 1.892 m (6' 2.5\")   Wt 96.2 kg (212 lb)   SpO2 100%   BMI 26.86 kg/m      Physical Exam  GENERAL: healthy, alert and no distress  EYES: Eyes grossly normal to inspection, PERRL and conjunctivae and sclerae normal  HENT: ear canals and TM's normal, nose and mouth without " ulcers or lesions  NECK: no adenopathy, no asymmetry, masses, or scars and thyroid normal to palpation  RESP: lungs clear to auscultation - no rales, rhonchi or wheezes  CV: regular rate and rhythm, normal S1 S2, no S3 or S4, no murmur, click or rub, no peripheral edema and peripheral pulses strong  ABDOMEN: soft, nontender, no hepatosplenomegaly, no masses and bowel sounds normal  MS: no gross musculoskeletal defects noted, no edema  SKIN: no suspicious lesions or rashes  NEURO: Normal strength and tone, mentation intact and speech normal  BACK: no CVA tenderness, no paralumbar tenderness  PSYCH: mentation appears normal, affect normal/bright        ASSESSMENT/PLAN:       ICD-10-CM    1. Health maintenance examination  Z00.00 CBC with platelets     Comprehensive metabolic panel (BMP + Alb, Alk Phos, ALT, AST, Total. Bili, TP)     Lipid panel reflex to direct LDL Fasting          Patient has been advised of split billing requirements and indicates understanding: Yes      COUNSELING:   Reviewed preventive health counseling, as reflected in patient instructions        He reports that he has never smoked. He quit smokeless tobacco use about 6 years ago.  His smokeless tobacco use included chew.            Paco Bonilla MD  [facility]

## 2023-10-26 LAB
ALBUMIN SERPL BCG-MCNC: 4.7 G/DL (ref 3.5–5.2)
ALP SERPL-CCNC: 43 U/L (ref 40–129)
ALT SERPL W P-5'-P-CCNC: 26 U/L (ref 0–70)
ANION GAP SERPL CALCULATED.3IONS-SCNC: 10 MMOL/L (ref 7–15)
AST SERPL W P-5'-P-CCNC: 32 U/L (ref 0–45)
BILIRUB SERPL-MCNC: 0.4 MG/DL
BUN SERPL-MCNC: 20.9 MG/DL (ref 6–20)
CALCIUM SERPL-MCNC: 9.9 MG/DL (ref 8.6–10)
CHLORIDE SERPL-SCNC: 102 MMOL/L (ref 98–107)
CREAT SERPL-MCNC: 1.26 MG/DL (ref 0.67–1.17)
DEPRECATED HCO3 PLAS-SCNC: 27 MMOL/L (ref 22–29)
EGFRCR SERPLBLD CKD-EPI 2021: 73 ML/MIN/1.73M2
GLUCOSE SERPL-MCNC: 85 MG/DL (ref 70–99)
POTASSIUM SERPL-SCNC: 4.5 MMOL/L (ref 3.4–5.3)
PROT SERPL-MCNC: 7.6 G/DL (ref 6.4–8.3)
SODIUM SERPL-SCNC: 139 MMOL/L (ref 135–145)

## 2023-10-29 LAB
CHOLEST SERPL-MCNC: 139 MG/DL
HDL SERPL QN: 8.5 NM
HDL SERPL-SCNC: 40.8 UMOL/L
HDLC SERPL-MCNC: 61 MG/DL
HLD.LARGE SERPL-SCNC: <2.8 UMOL/L
LDL SERPL QN: 20.7 NM
LDL SERPL-SCNC: 913 NMOL/L
LDL SMALL SERPL-SCNC: 499 NMOL/L
LDLC SERPL CALC-MCNC: 61 MG/DL
PATHOLOGY STUDY: ABNORMAL
TRIGL SERPL-MCNC: 86 MG/DL
VLDL LARGE SERPL-SCNC: 2.4 NMOL/L
VLDL SERPL QN: 50.9 NM

## 2023-11-08 ENCOUNTER — OFFICE VISIT (OUTPATIENT)
Dept: OTHER | Facility: CLINIC | Age: 43
End: 2023-11-08
Attending: INTERNAL MEDICINE
Payer: COMMERCIAL

## 2023-11-08 VITALS
BODY MASS INDEX: 26.83 KG/M2 | WEIGHT: 215.8 LBS | SYSTOLIC BLOOD PRESSURE: 130 MMHG | HEART RATE: 91 BPM | OXYGEN SATURATION: 99 % | DIASTOLIC BLOOD PRESSURE: 87 MMHG | HEIGHT: 75 IN

## 2023-11-08 DIAGNOSIS — E78.5 HYPERLIPIDEMIA LDL GOAL <100: ICD-10-CM

## 2023-11-08 DIAGNOSIS — I82.629 ACUTE DEEP VEIN THROMBOSIS (DVT) OF OTHER VEIN OF UPPER EXTREMITY, UNSPECIFIED LATERALITY (H): ICD-10-CM

## 2023-11-08 DIAGNOSIS — G54.0 TOS (THORACIC OUTLET SYNDROME): ICD-10-CM

## 2023-11-08 DIAGNOSIS — E78.2 MIXED HYPERLIPIDEMIA: ICD-10-CM

## 2023-11-08 PROCEDURE — 99214 OFFICE O/P EST MOD 30 MIN: CPT | Performed by: INTERNAL MEDICINE

## 2023-11-08 PROCEDURE — 99213 OFFICE O/P EST LOW 20 MIN: CPT | Performed by: INTERNAL MEDICINE

## 2023-11-08 RX ORDER — ROSUVASTATIN CALCIUM 40 MG/1
40 TABLET, COATED ORAL DAILY
Qty: 90 TABLET | Refills: 3 | Status: SHIPPED | OUTPATIENT
Start: 2023-11-08

## 2023-11-08 RX ORDER — FENOFIBRATE 160 MG/1
160 TABLET ORAL DAILY
Qty: 90 TABLET | Refills: 3 | Status: SHIPPED | OUTPATIENT
Start: 2023-11-08

## 2023-11-08 NOTE — PROGRESS NOTES
Pool Ontiveros is a 42 year old male who is presenting at the current time to discuss his diagnosi(es) of            TOS (thoracic outlet syndrome)  Hyperlipidemia LDL goal <100  .           HPI:  Pool Ontiveros is a very pleasant 41 year old male with a history of hyperlipidemia and bipolar disorder well managed  with sertraline.  For most of his adult life he has lifted weights.  He took a 6 months break from lifting, but then had started back up.  When he started back up he felt his left arm became tight. He presented to the ED where he was noted to to have an occlusive left subclavian and axillary thrombosis as well as superficial left cephalic vein thrombosis.  He was started on IV heparin and taken to the Interventional Radiology suite for catheter-directed lysis and mechanical thrombectomy. Exam was significant for thoracic outlet syndrome and Dr. Ellison took him to the OR for left first rib resection and scalenectomy on 10/7/21. He was sent home on Xarelto 15 mg BID.      He returned for a venogram on 10/25/21 where the left subclavian vein was occluded and there were large collaterals explaining his minimal swelling.  Dr. Zamarripa was able to easily cannulate the subclavian vein.  Sequential angioplasty was performed up to a 10 mm balloon with good results and good flow.  Minimal irregularity noted on the vein.      On 11/3/21 he called with significant drainage from incision site. He came in and was noted to have unusual medial breakdown of incision.  It was suspected that he may have had an underlying seroma in the setting of primarily serosanguineous fluid. Area was debrided and reapproximated with nylon sutures.     Repeat venogram on 2/21/22 with patent veins. He stopped Xarelto on his own. D dimer one month later was normal.                 Review Of Systems  Skin: negative  Eyes: negative  Ears/Nose/Throat: negative  Respiratory: No shortness of breath, dyspnea on exertion, cough, or  hemoptysis  Cardiovascular: negative  Gastrointestinal: negative  Genitourinary: negative  Musculoskeletal: negative  Neurologic: negative  Psychiatric: negative  Hematologic/Lymphatic/Immunologic: negative  Endocrine: negative           PAST MEDICAL HISTORY:                  Past Medical History           Past Medical History:   Diagnosis Date    Depression with anxiety 7/19/2016    Hernia, abdominal      History of atrial septal defect repair 3/5/2012     Overview:  repaired age 5     History of inguinal hernia repair 3/5/2012     Overview:  Age 6     Mixed hyperlipidemia 7/19/2016            PAST SURGICAL HISTORY:                  Past Surgical History             Past Surgical History:   Procedure Laterality Date    HERNIA REPAIR        IR ANGIOGRAM THROUGH CATHETER FOLLOW UP   10/6/2021    IR UPPER EXTREMITY VENOGRAM LEFT   10/5/2021    IR UPPER EXTREMITY VENOGRAM LEFT   10/25/2021    IR UPPER EXTREMITY VENOGRAM LEFT   11/12/2021    TRANSAXILLARY RESECT FIRST RIB Left 10/7/2021     Procedure: LEFT TRANSAXILLARY FIRST RIB RESECTION AND SCALENECTOMY.;  Surgeon: Jovi Ellison;  Location:  OR    TRANSAXILLARY RESECT FIRST RIB Right 2/23/2022     Procedure: RIGHT TRANSAXILLARY FIRST RIB RESECTION AND SCALENECTOMY;  Surgeon: Jovi Ellison;  Location:  OR            CURRENT MEDICATIONS:                  Current Outpatient Prescriptions               Current Outpatient Medications   Medication Sig Dispense Refill    acetaminophen (TYLENOL) 325 MG tablet Take 500 mg by mouth once        aspirin (ASA) 81 MG EC tablet Take 1 tablet (81 mg) by mouth daily        atomoxetine (STRATTERA) 18 MG capsule TAKE 1 CAPSULE BY MOUTH EVERY DAY        fenofibrate (TRIGLIDE/LOFIBRA) 160 MG tablet Take 1 tablet (160 mg) by mouth daily 90 tablet 3    ibuprofen (ADVIL/MOTRIN) 400 MG tablet Take 1 tablet (400 mg) by mouth every 6 hours as needed for moderate pain        mirtazapine (REMERON) 15 MG tablet TAKE 0.5-1  TABLET BY MOUTH AT BEDTIME        rosuvastatin (CRESTOR) 40 MG tablet Take 1 tablet (40 mg) by mouth daily 90 tablet 3    senna-docusate (SENOKOT-S/PERICOLACE) 8.6-50 MG tablet Take 1 tablet by mouth 2 times daily as needed for constipation 20 tablet 0    sertraline (ZOLOFT) 100 MG tablet Take 1.5 tablets (150 mg) by mouth daily 135 tablet 3    XARELTO ANTICOAGULANT 20 MG TABS tablet TAKE 1 TABLET BY MOUTH DAILY WITH DINNER 30 tablet 3            ALLERGIES:                          Allergies   Allergen Reactions    Vicodin [Hydrocodone-Acetaminophen] Hives         SOCIAL HISTORY:                  Social History   Social History                Socioeconomic History    Marital status:        Spouse name: Not on file    Number of children: Not on file    Years of education: Not on file    Highest education level: Not on file   Occupational History    Not on file   Tobacco Use    Smoking status: Never Smoker    Smokeless tobacco: Former User       Types: Chew       Quit date: 1/1/2017    Tobacco comment: Gum, ocasionally    Vaping Use    Vaping Use: Never used   Substance and Sexual Activity    Alcohol use: Yes       Comment: none at all now     Drug use: No    Sexual activity: Yes       Partners: Female   Other Topics Concern    Parent/sibling w/ CABG, MI or angioplasty before 65F 55M? Not Asked   Social History Narrative    Not on file      Social Determinants of Health      Financial Resource Strain: Not on file   Food Insecurity: Not on file   Transportation Needs: Not on file   Physical Activity: Not on file   Stress: Not on file   Social Connections: Not on file   Intimate Partner Violence: Not on file   Housing Stability: Not on file            FAMILY HISTORY:                   Family History             Family History   Problem Relation Age of Onset    Bipolar Disorder Mother      Depression Mother                       Physical exam was not performed as it was a billable telephone encounter.         Component      Latest Ref Rng & Units 10/5/2021 5/19/2022 6/9/2022   D-Dimer Quantitative      0.00 - 0.50 ug/mL FEU 0.84 (H) 0.37 0.37       US UPPER EXTREMITY VENOUS DUPLEX BILATERAL  6/9/2022 2:12 PM      HISTORY: History of left first rib resection on 10/7/2021. Multiple  follow-up venograms. Right first rib resection on 2/23/2022. Evaluate  with maneuvers. TOS (thoracic outlet syndrome).     COMPARISON: 2/21/2022     FINDINGS: Color Doppler and spectral waveform analysis was performed  throughout the bilateral subclavian veins with the arm in neutral, 90  degrees, 180 degrees,  press,  press head turned right  and  press head turned left.     Right: There is loss of the normal phasicity of the right subclavian  vein with the arm at 90 and 180 degrees.     Left: Left subclavian vein is patent with all maneuvers.                                                                      IMPRESSION:  1. Loss of normal phasicity of the right subclavian vein with the arm  at 90 degrees and 180 degrees.  2. Left subclavian vein is patent with all maneuvers.     LAZ LAM,       DATE: 2/21/2022     EXAM: RIGHT UPPER EXTREMITY ARTERIAL ULTRASOUND WITH PROVOCATIVE  MANEUVERING     INDICATION: Upper extremity pain/swelling, history of left first rib  resection for suspected thoracic outlet syndrome     TECHNIQUE: Duplex imaging was performed utilizing gray-scale,  compression, augmentation as appropriate, color-flow, Doppler waveform  analysis, and spectral Doppler imaging. Provocative maneuvering was  performed with the arm in the neutral position, arm 90 degrees, arm  180 degrees,  press,  press head turned right,   press head turned left     COMPARISON: No pertinent comparison study is available for review.     FINDINGS:   RIGHT: Patent right subclavian artery in the neutral position with  normal triphasic waveforms. No significant change in artery waveforms  or  velocities with provocative maneuvering.                                                                      IMPRESSION:   1. Patent right subclavian artery with normal triphasic flow. No  significant change in waveforms or velocities with provocative  maneuvering.     LESLIE DOMINGUEZ MD         SYSTEM ID:  [id number]     EXAM: BILATERAL UPPER EXTREMITY VENOUS ULTRASOUND WITH PROVOCATIVE  MANEUVERING     INDICATION: Upper extremity pain/swelling, history of left first rib  resection for suspected thoracic outlet syndrome     TECHNIQUE: Duplex imaging was performed utilizing gray-scale,  compression, augmentation as appropriate, color-flow, Doppler waveform  analysis, and spectral Doppler imaging. Provocative maneuvering was  performed with the arm in the neutral position, arm 90 degrees, arm  180 degrees,  press,  press head turned right,   press head turned left     COMPARISON: No pertinent comparison study is available for review.     FINDINGS:   RIGHT: Patent right subclavian vein in the neutral positioning with  expected phasic waveforms. There is loss of phasic flow and elevation  in velocities with the arm at 90 degrees. No flow in the right  subclavian vein with the arm at 180 degrees. The right subclavian vein  is patent in the  press,  press head right, and   press head left positions.     LEFT: The left subclavian vein with phasic flow. No significant change  in waveforms or velocities with arm positioning.                                                                      IMPRESSION:   1. RIGHT: Loss of subclavian vein phasic flow and elevation in  velocities with the arm at 90 degrees. No flow in the subclavian vein  with the arm at 180 degrees.  2. LEFT: Patent left subclavian vein with no significant change in  waveforms or velocities after provocative maneuvering.     LESLIE DOMINGUEZ MD      Component      Latest Ref Rng 10/25/2023  11:57 AM   Sodium       135 - 145 mmol/L 139    Potassium      3.4 - 5.3 mmol/L 4.5    Carbon Dioxide (CO2)      22 - 29 mmol/L 27    Anion Gap      7 - 15 mmol/L 10    Urea Nitrogen      6.0 - 20.0 mg/dL 20.9 (H)    Creatinine      0.67 - 1.17 mg/dL 1.26 (H)    GFR Estimate      >60 mL/min/1.73m2 73    Calcium      8.6 - 10.0 mg/dL 9.9    Chloride      98 - 107 mmol/L 102    Glucose      70 - 99 mg/dL 85    Alkaline Phosphatase      40 - 129 U/L 43    AST      0 - 45 U/L 32    ALT      0 - 70 U/L 26    Protein Total      6.4 - 8.3 g/dL 7.6    Albumin      3.5 - 5.2 g/dL 4.7    Bilirubin Total      <=1.2 mg/dL 0.4    Total Cholesterol      <=199 mg/dL 139    Triglycerides      30 - 149 mg/dL 86    Triglycerides      <150 mg/dL 77    HDL Cholesterol      40 - 59 mg/dL 61 (H)    LDL Cholesterol      <=129 mg/dL 61    HDL Size      >=8.9 nm 8.5 (L)    VLDL Size      <=46.7 nm 50.9 (H)    LDL Particle Size      >=20.7 nm 20.7    Lge HDL Particle number      >=4.2 umol/L <2.8 (L)    HDL Particle Number NMR      >=33.0 umol/L 40.8    Lge VLDL Part number      <=2.7 nmol/L 2.4    Small LDL Particle number      <=634 nmol/L 499    LDL Particle Number      <=1135 nmol/L 913    EER LipoFit by NMR See Note    WBC      4.0 - 11.0 10e3/uL 5.0    RBC Count      4.40 - 5.90 10e6/uL 4.98    Hemoglobin      13.3 - 17.7 g/dL 14.8    Hematocrit      40.0 - 53.0 % 45.6    MCV      78 - 100 fL 92    MCH      26.5 - 33.0 pg 29.7    MCHC      31.5 - 36.5 g/dL 32.5    RDW      10.0 - 15.0 % 12.4    Platelet Count      150 - 450 10e3/uL 227    Cholesterol      <200 mg/dL 134    HDL Cholesterol      >=40 mg/dL 60    LDL Cholesterol Calculated      <=100 mg/dL 59    Non HDL Cholesterol      <130 mg/dL 74    Lipoprotein (a)      <30 mg/dL <6    C-Reactive Protein High Sensitivity 0.48       Legend:  (H) High  (L) Low                  1.  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 D dimer was normal.   -Disontinued Xarelto on his own, the d dimer was drawn after he had been off of it for  one month. Rechecked d dimer again 6/9/22, and it was normal.  -Stop checking d dimer. Stay off of AC. Use baby ASA lifelong.            2.  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       -His triglycerides have been greater than 500.  His direct LDL was 125.    -Started on Crestor 20 mg daily and Fenofibrate 160 mg daily in 10/2021.   -Advanced lipid testing of 10/25/2023 revealed LDL-C of 61, LDL-P of 913, cardiac CRP of 0.48, and Lp(a) of <6.                        32 minutes total medical care on today's date.

## 2023-11-08 NOTE — PROGRESS NOTES
"Patient is here to discuss follow up    /87 (BP Location: Right arm, Patient Position: Chair, Cuff Size: Adult Regular)   Pulse 91   Ht 6' 2.5\" (1.892 m)   Wt 215 lb 12.8 oz (97.9 kg)   SpO2 99%   BMI 27.34 kg/m      Questions patient would like addressed today are: N/A.    Refills are needed: No    Has homecare services and agency name:  Neema OZUNA    "

## 2024-01-08 NOTE — NURSING NOTE
"Chief Complaint   Patient presents with     ER F/U       Initial /74 (Cuff Size: Adult Large)  Pulse 83  Temp 98  F (36.7  C) (Tympanic)  Resp 12  Ht 6' 2.5\" (1.892 m)  Wt 215 lb (97.5 kg)  SpO2 98%  BMI 27.24 kg/m2 Estimated body mass index is 27.24 kg/(m^2) as calculated from the following:    Height as of this encounter: 6' 2.5\" (1.892 m).    Weight as of this encounter: 215 lb (97.5 kg).  Medication Reconciliation: complete   Janneth Thapa, CMA    " Please call the [address] [place] [address] at [phone], select OPTION #2,  if any of your medications change. This means: if a med is discontinued, a new med is started, or a dose is changed. These meds may interact with your warfarin and we may need to adjust your dose. This is especially important if you start any type of ANTIBIOTIC. Also, please call if you have any admissions to the hospital, visits to an emergency room, procedures planned, or if any other medical provider has instructed you to hold your warfarin.

## 2024-03-11 PROCEDURE — 99285 EMERGENCY DEPT VISIT HI MDM: CPT | Mod: 25

## 2024-03-12 ENCOUNTER — HOSPITAL ENCOUNTER (EMERGENCY)
Facility: CLINIC | Age: 44
Discharge: HOME | End: 2024-03-12
Attending: EMERGENCY MEDICINE | Admitting: EMERGENCY MEDICINE
Payer: COMMERCIAL

## 2024-03-12 ENCOUNTER — APPOINTMENT (OUTPATIENT)
Dept: CT IMAGING | Facility: CLINIC | Age: 44
End: 2024-03-12
Attending: EMERGENCY MEDICINE
Payer: COMMERCIAL

## 2024-03-12 ENCOUNTER — MYC MEDICAL ADVICE (OUTPATIENT)
Dept: FAMILY MEDICINE | Facility: CLINIC | Age: 44
End: 2024-03-12

## 2024-03-12 ENCOUNTER — OFFICE VISIT (OUTPATIENT)
Dept: FAMILY MEDICINE | Facility: CLINIC | Age: 44
End: 2024-03-12
Payer: COMMERCIAL

## 2024-03-12 VITALS
TEMPERATURE: 97.9 F | SYSTOLIC BLOOD PRESSURE: 116 MMHG | HEART RATE: 81 BPM | OXYGEN SATURATION: 100 % | RESPIRATION RATE: 12 BRPM | DIASTOLIC BLOOD PRESSURE: 77 MMHG

## 2024-03-12 VITALS
BODY MASS INDEX: 27.87 KG/M2 | OXYGEN SATURATION: 99 % | WEIGHT: 220 LBS | RESPIRATION RATE: 18 BRPM | TEMPERATURE: 97.7 F | HEART RATE: 71 BPM | DIASTOLIC BLOOD PRESSURE: 88 MMHG | SYSTOLIC BLOOD PRESSURE: 122 MMHG

## 2024-03-12 DIAGNOSIS — R55 SYNCOPE, UNSPECIFIED SYNCOPE TYPE: ICD-10-CM

## 2024-03-12 DIAGNOSIS — R55 SYNCOPE AND COLLAPSE: ICD-10-CM

## 2024-03-12 DIAGNOSIS — N17.9 AKI (ACUTE KIDNEY INJURY) (H): Primary | ICD-10-CM

## 2024-03-12 DIAGNOSIS — E78.2 MIXED HYPERLIPIDEMIA: ICD-10-CM

## 2024-03-12 DIAGNOSIS — G54.0 TOS (THORACIC OUTLET SYNDROME): ICD-10-CM

## 2024-03-12 DIAGNOSIS — N18.2 CKD (CHRONIC KIDNEY DISEASE) STAGE 2, GFR 60-89 ML/MIN: ICD-10-CM

## 2024-03-12 DIAGNOSIS — R79.89 ELEVATED SERUM CREATININE: ICD-10-CM

## 2024-03-12 LAB
ALBUMIN SERPL BCG-MCNC: 4.4 G/DL (ref 3.5–5.2)
ALP SERPL-CCNC: 36 U/L (ref 40–150)
ALT SERPL W P-5'-P-CCNC: 26 U/L (ref 0–70)
ANION GAP SERPL CALCULATED.3IONS-SCNC: 13 MMOL/L (ref 7–15)
AST SERPL W P-5'-P-CCNC: 31 U/L (ref 0–45)
ATRIAL RATE - MUSE: 73 BPM
BASOPHILS # BLD AUTO: 0 10E3/UL (ref 0–0.2)
BASOPHILS NFR BLD AUTO: 0 %
BILIRUB DIRECT SERPL-MCNC: <0.2 MG/DL (ref 0–0.3)
BILIRUB SERPL-MCNC: 0.2 MG/DL
BUN SERPL-MCNC: 25.4 MG/DL (ref 6–20)
CALCIUM SERPL-MCNC: 8.9 MG/DL (ref 8.6–10)
CHLORIDE SERPL-SCNC: 104 MMOL/L (ref 98–107)
CREAT SERPL-MCNC: 1.45 MG/DL (ref 0.67–1.17)
D DIMER PPP FEU-MCNC: <0.27 UG/ML FEU (ref 0–0.5)
DEPRECATED HCO3 PLAS-SCNC: 22 MMOL/L (ref 22–29)
DIASTOLIC BLOOD PRESSURE - MUSE: NORMAL MMHG
EGFRCR SERPLBLD CKD-EPI 2021: 62 ML/MIN/1.73M2
EOSINOPHIL # BLD AUTO: 0.1 10E3/UL (ref 0–0.7)
EOSINOPHIL NFR BLD AUTO: 1 %
ERYTHROCYTE [DISTWIDTH] IN BLOOD BY AUTOMATED COUNT: 12.6 % (ref 10–15)
GLUCOSE SERPL-MCNC: 97 MG/DL (ref 70–99)
HCT VFR BLD AUTO: 39.6 % (ref 40–53)
HGB BLD-MCNC: 13.2 G/DL (ref 13.3–17.7)
IMM GRANULOCYTES # BLD: 0 10E3/UL
IMM GRANULOCYTES NFR BLD: 0 %
INTERPRETATION ECG - MUSE: NORMAL
LYMPHOCYTES # BLD AUTO: 1.9 10E3/UL (ref 0.8–5.3)
LYMPHOCYTES NFR BLD AUTO: 29 %
MCH RBC QN AUTO: 29.6 PG (ref 26.5–33)
MCHC RBC AUTO-ENTMCNC: 33.3 G/DL (ref 31.5–36.5)
MCV RBC AUTO: 89 FL (ref 78–100)
MONOCYTES # BLD AUTO: 0.5 10E3/UL (ref 0–1.3)
MONOCYTES NFR BLD AUTO: 8 %
NEUTROPHILS # BLD AUTO: 4.2 10E3/UL (ref 1.6–8.3)
NEUTROPHILS NFR BLD AUTO: 62 %
NRBC # BLD AUTO: 0 10E3/UL
NRBC BLD AUTO-RTO: 0 /100
P AXIS - MUSE: 70 DEGREES
PLATELET # BLD AUTO: 204 10E3/UL (ref 150–450)
POTASSIUM SERPL-SCNC: 3.7 MMOL/L (ref 3.4–5.3)
PR INTERVAL - MUSE: 184 MS
PROT SERPL-MCNC: 6.9 G/DL (ref 6.4–8.3)
QRS DURATION - MUSE: 98 MS
QT - MUSE: 418 MS
QTC - MUSE: 460 MS
R AXIS - MUSE: 32 DEGREES
RBC # BLD AUTO: 4.46 10E6/UL (ref 4.4–5.9)
SODIUM SERPL-SCNC: 139 MMOL/L (ref 135–145)
SYSTOLIC BLOOD PRESSURE - MUSE: NORMAL MMHG
T AXIS - MUSE: 36 DEGREES
TROPONIN T SERPL HS-MCNC: 13 NG/L
TROPONIN T SERPL HS-MCNC: 13 NG/L
VENTRICULAR RATE- MUSE: 73 BPM
WBC # BLD AUTO: 6.7 10E3/UL (ref 4–11)

## 2024-03-12 PROCEDURE — 85025 COMPLETE CBC W/AUTO DIFF WBC: CPT | Performed by: EMERGENCY MEDICINE

## 2024-03-12 PROCEDURE — 84484 ASSAY OF TROPONIN QUANT: CPT | Performed by: EMERGENCY MEDICINE

## 2024-03-12 PROCEDURE — 36415 COLL VENOUS BLD VENIPUNCTURE: CPT | Performed by: EMERGENCY MEDICINE

## 2024-03-12 PROCEDURE — 258N000003 HC RX IP 258 OP 636: Performed by: EMERGENCY MEDICINE

## 2024-03-12 PROCEDURE — 80048 BASIC METABOLIC PNL TOTAL CA: CPT | Performed by: EMERGENCY MEDICINE

## 2024-03-12 PROCEDURE — 82570 ASSAY OF URINE CREATININE: CPT | Performed by: FAMILY MEDICINE

## 2024-03-12 PROCEDURE — 96360 HYDRATION IV INFUSION INIT: CPT

## 2024-03-12 PROCEDURE — 85379 FIBRIN DEGRADATION QUANT: CPT | Performed by: EMERGENCY MEDICINE

## 2024-03-12 PROCEDURE — 70450 CT HEAD/BRAIN W/O DYE: CPT

## 2024-03-12 PROCEDURE — 82043 UR ALBUMIN QUANTITATIVE: CPT | Performed by: FAMILY MEDICINE

## 2024-03-12 PROCEDURE — 82248 BILIRUBIN DIRECT: CPT | Performed by: EMERGENCY MEDICINE

## 2024-03-12 PROCEDURE — 93005 ELECTROCARDIOGRAM TRACING: CPT

## 2024-03-12 PROCEDURE — 96361 HYDRATE IV INFUSION ADD-ON: CPT

## 2024-03-12 PROCEDURE — 99214 OFFICE O/P EST MOD 30 MIN: CPT | Performed by: FAMILY MEDICINE

## 2024-03-12 RX ADMIN — SODIUM CHLORIDE 1000 ML: 9 INJECTION, SOLUTION INTRAVENOUS at 02:55

## 2024-03-12 RX ADMIN — SODIUM CHLORIDE 1000 ML: 9 INJECTION, SOLUTION INTRAVENOUS at 03:57

## 2024-03-12 ASSESSMENT — PAIN SCALES - GENERAL: PAINLEVEL: NO PAIN (0)

## 2024-03-12 ASSESSMENT — COLUMBIA-SUICIDE SEVERITY RATING SCALE - C-SSRS
6. HAVE YOU EVER DONE ANYTHING, STARTED TO DO ANYTHING, OR PREPARED TO DO ANYTHING TO END YOUR LIFE?: NO
2. HAVE YOU ACTUALLY HAD ANY THOUGHTS OF KILLING YOURSELF IN THE PAST MONTH?: NO
1. IN THE PAST MONTH, HAVE YOU WISHED YOU WERE DEAD OR WISHED YOU COULD GO TO SLEEP AND NOT WAKE UP?: NO

## 2024-03-12 ASSESSMENT — ACTIVITIES OF DAILY LIVING (ADL)
ADLS_ACUITY_SCORE: 35
ADLS_ACUITY_SCORE: 37

## 2024-03-12 NOTE — ED PROVIDER NOTES
History     Chief Complaint:  Syncope       HPI   Pool Ontiveros is a 43 year old male who presents with multiple syncopal episodes tonight.  He states that he is always had issues with getting lightheaded when he stands up too quickly, but it has been particularly bad over the last 10 days.  He states that he woke up in the middle the night to go to the bathroom, fell to the ground and fainted and hit his head, and was able to get up with the help of his wife, and eventually from a seated position he seems to have fainted again a second time.  Does not seem that there was much time in between the first and second fainting episodes, and he has now made a full recovery.  EMS was called after the second episode, and wife at bedside states that the patient was very sweaty and pale throughout this episode, but has made a full recovery.  No shaking movements, no urinary incontinence, no oral trauma.      Independent Historian:   Yes, wife is at bedside and confirms the above history.    Review of External Notes: Yes I reviewed the patient's last office visit from November 8, 2023 with the patient was seen by vascular surgery for thoracic outlet syndrome, seems to also had a DVT at that time.      Allergies:  Vicodin [Hydrocodone-Acetaminophen]     Medications:    atomoxetine (STRATTERA) 100 MG capsule  atomoxetine (STRATTERA) 40 MG capsule  fenofibrate (TRIGLIDE/LOFIBRA) 160 MG tablet  mirtazapine (REMERON) 15 MG tablet  rosuvastatin (CRESTOR) 40 MG tablet  sertraline (ZOLOFT) 100 MG tablet        Past Medical History:    Past Medical History:   Diagnosis Date    Depression with anxiety 7/19/2016    Hernia, abdominal     History of atrial septal defect repair 3/5/2012    History of inguinal hernia repair 3/5/2012    Mixed hyperlipidemia 7/19/2016       Past Surgical History:    Past Surgical History:   Procedure Laterality Date    HERNIA REPAIR      IR ANGIOGRAM THROUGH CATHETER FOLLOW UP  10/6/2021    IR UPPER  EXTREMITY VENOGRAM LEFT  10/5/2021    IR UPPER EXTREMITY VENOGRAM LEFT  10/25/2021    IR UPPER EXTREMITY VENOGRAM LEFT  11/12/2021    TRANSAXILLARY RESECT FIRST RIB Left 10/7/2021    Procedure: LEFT TRANSAXILLARY FIRST RIB RESECTION AND SCALENECTOMY.;  Surgeon: Jovi Ellison;  Location:  OR    TRANSAXILLARY RESECT FIRST RIB Right 2/23/2022    Procedure: RIGHT TRANSAXILLARY FIRST RIB RESECTION AND SCALENECTOMY;  Surgeon: Jovi Ellison;  Location:  OR        Family History:    family history includes Bipolar Disorder in his mother; Depression in his mother.    Social History:   reports that he has never smoked. His smokeless tobacco use includes chew. He reports that he does not currently use alcohol. He reports that he does not use drugs.  PCP: Paco Bonilla     Physical Exam   Patient Vitals for the past 24 hrs:   BP Temp Pulse Resp SpO2 Weight   03/12/24 0200 129/74 -- -- -- -- --   03/12/24 0009 103/54 97.7  F (36.5  C) 71 18 100 % 99.8 kg (220 lb)        Physical Exam  Vitals: reviewed by me  General: Pt seen on [facility], cooperative, and alert to conversation  Eyes: Tracking well, clear conjunctiva BL  ENT: MMM, midline trachea.  Does have a small scabbed over laceration to crown of head.  Full range of motion to his neck, denies any neck pain or tenderness  Lungs: No tachypnea, no accessory muscle use. No respiratory distress.   CV: Rate as above.  Normal S1-S2, no systolic ejection murmur heard or any other murmurs heard on auscultation  MSK: no joint effusion.  No evidence of trauma  Skin: No rash  Neuro: Clear speech and no facial droop.  Ambulatory with strong steady gait, moving all extremities and following all commands.  Psych: Not RIS, no e/o AH/VH        Emergency Department Course     ECG results from 03/12/24   EKG 12 lead     Value    Systolic Blood Pressure     Diastolic Blood Pressure     Ventricular Rate 73    Atrial Rate 73    SD Interval 184    QRS Duration  98        QTc 460    P Axis 70    R AXIS 32    T Axis 36    Interpretation ECG      Sinus rhythm  Normal ECG  When compared with ECG of 05-OCT-2021 14:49,  Nonspecific T wave abnormality has replaced inverted T waves in Inferior leads  Reviewed by Humberto SOSA           Imaging:  CT Head w/o Contrast   Final Result   IMPRESSION:   1.  No CT finding of a mass, hemorrhage or focal area suggestive of acute infarct.         Report per radiology    Laboratory:  Labs Ordered and Resulted from Time of ED Arrival to Time of ED Departure   BASIC METABOLIC PANEL - Abnormal       Result Value    Sodium 139      Potassium 3.7      Chloride 104      Carbon Dioxide (CO2) 22      Anion Gap 13      Urea Nitrogen 25.4 (*)     Creatinine 1.45 (*)     GFR Estimate 62      Calcium 8.9      Glucose 97     CBC WITH PLATELETS AND DIFFERENTIAL - Abnormal    WBC Count 6.7      RBC Count 4.46      Hemoglobin 13.2 (*)     Hematocrit 39.6 (*)     MCV 89      MCH 29.6      MCHC 33.3      RDW 12.6      Platelet Count 204      % Neutrophils 62      % Lymphocytes 29      % Monocytes 8      % Eosinophils 1      % Basophils 0      % Immature Granulocytes 0      NRBCs per 100 WBC 0      Absolute Neutrophils 4.2      Absolute Lymphocytes 1.9      Absolute Monocytes 0.5      Absolute Eosinophils 0.1      Absolute Basophils 0.0      Absolute Immature Granulocytes 0.0      Absolute NRBCs 0.0     TROPONIN T, HIGH SENSITIVITY - Normal    Troponin T, High Sensitivity 13     HEPATIC FUNCTION PANEL   TROPONIN T, HIGH SENSITIVITY          Emergency Department Course & Assessments:             Interventions:  Medications   sodium chloride 0.9% BOLUS 1,000 mL (has no administration in time range)          Independent Interpretation (X-rays, CTs, rhythm strip):  Yes I have independently reviewed the patient's CT scan of the head, no obvious hemorrhage noted           Social Determinants of Health affecting care:   Stress/Adjustment  Disorders      Disposition:  The patient was discharged to home.     Impression & Plan        Medical Decision Making:  This is a very pleasant 43-year-old male who presents to the emergency room with what appears to be a syncopal episode earlier tonight.  It sounds like this may have been all related to the same syncopal episode, and given the head trauma I did do a CT scan of his head which thankfully is unremarkable.  He has been mentating appropriately here and I do suspect some degree of dehydration not only because of his elevated kidney levels, but also because his blood pressure was initially quite low and his, with IV fluids.  He also looks somewhat pale on arrival and now looks to be much more energetic and has a better color.  He ambulated around the ER without any dizziness and states that this is a chronic issue for him, but does endorse that today was worse.  I do not think that antibiotics are indicated, he has a reassuring neurologic exam and I do think that he stable to go home.  We talked about how his kidneys are showing signs of damage, and how his creatinine has been creeping up since last October, and how this needs to be followed with his regular doctor in the week ahead.  I do think that he is stable to have his creatinine issues evaluated in the outpatient setting.    Wife and patient are concerned about possibly being dehydrated as they believe that he is actually quite hydrated as he did urinate a significant amount throughout the day today, and if this keeps up, he certainly need to be looked up for causes of intrinsic renal failure.  However I do think that there is an element of dehydration here, given the above rationale.  Regardless I do think that he stable for outpatient management and I do not see any benefit to admitting him here for monitoring or emergent neurologic consult.  Otherwise well-appearing, D-dimer negative, no evidence of PE or any respiratory issues that may have  caused his fainting.  Will plan for discharge as above, the patient is highly reliable appearing    Diagnosis:    ICD-10-CM    1. Syncope and collapse  R55       2. Elevated serum creatinine  R79.89                   3/12/2024   Rob Paul*        Rob Paul MD  03/12/24 0648

## 2024-03-12 NOTE — PROGRESS NOTES
"  Assessment & Plan     JANET (acute kidney injury) (H24)  Visited ED with a sign of syncope, found no intracranial pathology but found having a sign of dehydration with decreased renal function   Will have him to check on lab for further evaluation and referred to nephrology for further evaluation   He has been drinking almost 35 oz of coffee daily with caffeine rich pop frequently  Encouraged him to cut the amount of them as much as he can  Encouraged him to monitor if he has any supplement has nephrotoxic agents    - Albumin Random Urine Quantitative with Creat Ratio; Future  - Protein timed urine; Future  - Adult Nephrology  Referral; Future  - Albumin Random Urine Quantitative with Creat Ratio  - Protein timed urine    CKD (chronic kidney disease) stage 2, GFR 60-89 ml/min  Mentioned above   - Albumin Random Urine Quantitative with Creat Ratio; Future  - Protein timed urine; Future  - Adult Nephrology  Referral; Future  - Albumin Random Urine Quantitative with Creat Ratio  - Protein timed urine    Syncope, unspecified syncope type  Has no focal neurologic sign, has no sign concerning vertigo  Has potential sx of postconcussion syndrome from the fall this morning   Will have him to check on carotid US based on the h/o TOS   - US Carotid Bilateral; Future    Mixed hyperlipidemia  Has been stable with current dose of meds, has no sign of overdosing nor using other chemicals, will keep monitoring     TOS (thoracic outlet syndrome)  Mentioned above           MED REC REQUIRED  Post Medication Reconciliation Status:  Discharge medications reconciled, continue medications without change  BMI  Estimated body mass index is 27.87 kg/m  as calculated from the following:    Height as of 11/8/23: 1.892 m (6' 2.5\").    Weight as of an earlier encounter on 3/12/24: 99.8 kg (220 lb).   Weight management plan: Discussed healthy diet and exercise guidelines      FUTURE APPOINTMENTS:       - Follow-up visit in 4-6 " weeks after being  seen by nephrology     Subjective   Zain is a 43 year old, presenting for the following health issues:  Hospital F/U        3/12/2024     1:01 PM   Additional Questions   Roomed by Gerson   Accompanied by Wife     HPI       ED/UC Followup:    Facility:  [facility] Emergency Dept  Date of visit: 03/12/24  Reason for visit: Syncope  Current Status: brain fog and lightheaded        Review of Systems  Constitutional, HEENT, cardiovascular, pulmonary, GI, , musculoskeletal, neuro, skin, endocrine and psych systems are negative, except as otherwise noted.      Objective    /77   Pulse 81   Temp 97.9  F (36.6  C) (Tympanic)   Resp 12   SpO2 100%   There is no height or weight on file to calculate BMI.  Physical Exam   GENERAL: alert and no distress  EYES: Eyes grossly normal to inspection, PERRL and conjunctivae and sclerae normal  HENT: ear canals and TM's normal, nose and mouth without ulcers or lesions  NECK: no adenopathy, no asymmetry, masses, or scars  RESP: lungs clear to auscultation - no rales, rhonchi or wheezes  CV: regular rate and rhythm, normal S1 S2, no S3 or S4, no murmur, click or rub, no peripheral edema  ABDOMEN: soft, nontender, no hepatosplenomegaly, no masses and bowel sounds normal  MS: no gross musculoskeletal defects noted, no edema  SKIN: no suspicious lesions or rashes  NEURO: Normal strength and tone, mentation intact and speech normal  BACK: no CVA tenderness, no paralumbar tenderness  PSYCH: mentation appears normal, affect normal/bright  LYMPH: no cervical, supraclavicular, axillary, or inguinal adenopathy            Signed Electronically by: Paco Bonilla MD

## 2024-03-12 NOTE — ED NOTES
Pt ambulated down kraft and back to room denies any dizziness. Back to room and bed. Obtained B/P and Heart rate and SpO2

## 2024-03-12 NOTE — ED TRIAGE NOTES
Pt arrived via EMS presenting after 2x syncopal episode at home. EMS reports he was walking to the bathroom and had a syncopal episode and hit head on bathroom floor, small laceration. No thinners. On fire arrival, pt had another syncopal episode  BG 98. EMS gave 500 PTA     Triage Assessment (Adult)       Row Name 03/11/24 0415          Triage Assessment    Airway WDL WDL        Respiratory WDL    Respiratory WDL WDL        Skin Circulation/Temperature WDL    Skin Circulation/Temperature WDL WDL        Cardiac WDL    Cardiac WDL WDL        Peripheral/Neurovascular WDL    Peripheral Neurovascular WDL WDL        Cognitive/Neuro/Behavioral WDL    Cognitive/Neuro/Behavioral WDL WDL

## 2024-03-12 NOTE — DISCHARGE INSTRUCTIONS
As we discussed, I do certainly think is possible that you were dehydrated, even though he did have a significant amount of urination earlier today.  Supporting this is your kidney function level that we talked about today, as well as your blood pressure initially being quite low, and coming up here with fluids.  Your EKG does not show any abnormalities that would require you to stay in the hospital and I do think you are stable to go home and follow with your regular doctor in a week ahead, as we discussed it is very important that you have your regular doctor recheck your kidney function since October it has been increasing and this needs to be reassessed in the next week.  Please come back to the ER immediately with any other concerns you have or any new symptoms.

## 2024-03-13 ENCOUNTER — APPOINTMENT (OUTPATIENT)
Dept: LAB | Facility: CLINIC | Age: 44
End: 2024-03-13
Payer: COMMERCIAL

## 2024-03-13 ENCOUNTER — HOSPITAL ENCOUNTER (OUTPATIENT)
Dept: ULTRASOUND IMAGING | Facility: CLINIC | Age: 44
Discharge: HOME | End: 2024-03-13
Attending: FAMILY MEDICINE | Admitting: FAMILY MEDICINE
Payer: COMMERCIAL

## 2024-03-13 DIAGNOSIS — R55 SYNCOPE, UNSPECIFIED SYNCOPE TYPE: ICD-10-CM

## 2024-03-13 LAB
CREAT UR-MCNC: 43.4 MG/DL
MICROALBUMIN UR-MCNC: <12 MG/L
MICROALBUMIN/CREAT UR: NORMAL MG/G{CREAT}

## 2024-03-13 PROCEDURE — 93880 EXTRACRANIAL BILAT STUDY: CPT

## 2024-03-14 ENCOUNTER — TELEPHONE (OUTPATIENT)
Dept: OTHER | Facility: CLINIC | Age: 44
End: 2024-03-14
Payer: COMMERCIAL

## 2024-03-14 LAB
ALBUMIN MFR UR ELPH: <6 MG/DL
COLLECT DURATION TIME UR: 24 H
PROT 24H UR-MRATE: NORMAL G/(24.H)
SPECIMEN VOL UR: 6050 ML

## 2024-03-14 PROCEDURE — 81050 URINALYSIS VOLUME MEASURE: CPT | Performed by: FAMILY MEDICINE

## 2024-03-14 PROCEDURE — 84156 ASSAY OF PROTEIN URINE: CPT | Performed by: FAMILY MEDICINE

## 2024-03-15 NOTE — TELEPHONE ENCOUNTER
[facility]    Pool Ontiveros left a message for me this morning.  He had several syncopal episodes more recently for no obvious etiology.    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  Widely patent subclavian vein on follow-up venogram.  Continue on Xarelto until February 2022.  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  Underwent right transaxillary first rib resection scalenectomy on 2/23/2022.  Doing very well on follow-up duplex 9/8/2022.  No evidence at all of arterial issues.      He was seen for the syncopal episodes in ED on 3/12/2024.  Laboratory tests were unremarkable except for an elevated SCr= 1.05.  Normal D-dimer.  CT of his head was normal.    3/13/2024 carotid duplex revealed no evidence of stenosis in either carotid arteries.      [facility] called Ch[facility] back this evening [facility]  Reviewed his studies with no abnormalities do not feel this is a vascular issue.  As he mentioned it may have been related to stress and dehydration.  No specific further workup is indicated.  He has my number to call me back if he has any concerns.       Jovi Ellison MD

## 2024-03-26 NOTE — CONFIDENTIAL NOTE
DIAGNOSIS:    JANET (acute kidney injury) (H24)   CKD (chronic kidney disease) stage 2, GFR 60-89 ml/min      DATE RECEIVED: 06.14.2024    NOTES STATUS DETAILS   OFFICE NOTE from referring provider Internal 03.12.2024 Paco Bonilla MD    OFFICE NOTE from other specialist      *Only VASCULITIS or LUPUS gather office notes for the following     *PULMONARY       *ENT     *DERMATOLOGY     *RHEUMATOLOGY     DISCHARGE SUMMARY from hospital     DISCHARGE REPORT from the ER     MEDICATION LIST Internal    IMAGING  (NEED IMAGES AND REPORTS)     KIDNEY CT SCAN     KIDNEY ULTRASOUND     MR ABDOMEN     NUCLEAR MEDICINE RENAL     LABS     CBC Internal 03.12.2024   CMP Internal 03.12.2024    BMP Internal 03.12.2024    UA     URINE PROTEIN     RENAL PANEL     BIOPSY     KIDNEY BIOPSY

## 2024-06-11 DIAGNOSIS — N17.9 AKI (ACUTE KIDNEY INJURY) (H): Primary | ICD-10-CM

## 2024-06-14 ENCOUNTER — OFFICE VISIT (OUTPATIENT)
Dept: NEPHROLOGY | Facility: CLINIC | Age: 44
End: 2024-06-14
Attending: FAMILY MEDICINE
Payer: COMMERCIAL

## 2024-06-14 ENCOUNTER — ANCILLARY PROCEDURE (OUTPATIENT)
Dept: ULTRASOUND IMAGING | Facility: CLINIC | Age: 44
End: 2024-06-14
Attending: STUDENT IN AN ORGANIZED HEALTH CARE EDUCATION/TRAINING PROGRAM
Payer: COMMERCIAL

## 2024-06-14 ENCOUNTER — PRE VISIT (OUTPATIENT)
Dept: NEPHROLOGY | Facility: CLINIC | Age: 44
End: 2024-06-14

## 2024-06-14 ENCOUNTER — LAB (OUTPATIENT)
Dept: LAB | Facility: CLINIC | Age: 44
End: 2024-06-14
Attending: STUDENT IN AN ORGANIZED HEALTH CARE EDUCATION/TRAINING PROGRAM
Payer: COMMERCIAL

## 2024-06-14 VITALS
OXYGEN SATURATION: 100 % | HEART RATE: 88 BPM | SYSTOLIC BLOOD PRESSURE: 118 MMHG | BODY MASS INDEX: 28.25 KG/M2 | WEIGHT: 223 LBS | DIASTOLIC BLOOD PRESSURE: 82 MMHG

## 2024-06-14 DIAGNOSIS — R79.89 ELEVATED SERUM CREATININE: Primary | ICD-10-CM

## 2024-06-14 DIAGNOSIS — R79.89 ELEVATED SERUM CREATININE: ICD-10-CM

## 2024-06-14 DIAGNOSIS — R35.89 POLYURIA: ICD-10-CM

## 2024-06-14 DIAGNOSIS — R55 SYNCOPE, UNSPECIFIED SYNCOPE TYPE: ICD-10-CM

## 2024-06-14 DIAGNOSIS — N17.9 AKI (ACUTE KIDNEY INJURY) (H): ICD-10-CM

## 2024-06-14 LAB
ALBUMIN MFR UR ELPH: 62.2 MG/DL
ALBUMIN SERPL BCG-MCNC: 4.8 G/DL (ref 3.5–5.2)
ALBUMIN UR-MCNC: 70 MG/DL
ANION GAP SERPL CALCULATED.3IONS-SCNC: 12 MMOL/L (ref 7–15)
APPEARANCE UR: CLEAR
BILIRUB UR QL STRIP: NEGATIVE
BUN SERPL-MCNC: 20.9 MG/DL (ref 6–20)
CALCIUM SERPL-MCNC: 10.2 MG/DL (ref 8.6–10)
CHLORIDE SERPL-SCNC: 102 MMOL/L (ref 98–107)
COLOR UR AUTO: ABNORMAL
CREAT SERPL-MCNC: 1.3 MG/DL (ref 0.67–1.17)
CREAT UR-MCNC: 113 MG/DL
CREAT UR-MCNC: 114 MG/DL
CYSTATIN C (ROCHE): 1 MG/L (ref 0.6–1)
DEPRECATED HCO3 PLAS-SCNC: 26 MMOL/L (ref 22–29)
EGFRCR SERPLBLD CKD-EPI 2021: 70 ML/MIN/1.73M2
ERYTHROCYTE [DISTWIDTH] IN BLOOD BY AUTOMATED COUNT: 12.5 % (ref 10–15)
GFR SERPL CREATININE-BSD FRML MDRD: 83 ML/MIN/1.73M2
GLUCOSE SERPL-MCNC: 99 MG/DL (ref 70–99)
GLUCOSE UR STRIP-MCNC: NEGATIVE MG/DL
HCT VFR BLD AUTO: 43.8 % (ref 40–53)
HGB BLD-MCNC: 14.6 G/DL (ref 13.3–17.7)
HGB UR QL STRIP: NEGATIVE
HYALINE CASTS: 7 /LPF
KETONES UR STRIP-MCNC: NEGATIVE MG/DL
LEUKOCYTE ESTERASE UR QL STRIP: NEGATIVE
MCH RBC QN AUTO: 29.7 PG (ref 26.5–33)
MCHC RBC AUTO-ENTMCNC: 33.3 G/DL (ref 31.5–36.5)
MCV RBC AUTO: 89 FL (ref 78–100)
MICROALBUMIN UR-MCNC: 13.6 MG/L
MICROALBUMIN/CREAT UR: 12.04 MG/G CR (ref 0–17)
MUCOUS THREADS #/AREA URNS LPF: PRESENT /LPF
NITRATE UR QL: NEGATIVE
PH UR STRIP: 6 [PH] (ref 5–7)
PHOSPHATE SERPL-MCNC: 3.3 MG/DL (ref 2.5–4.5)
PLATELET # BLD AUTO: 208 10E3/UL (ref 150–450)
POTASSIUM SERPL-SCNC: 4.3 MMOL/L (ref 3.4–5.3)
PROT/CREAT 24H UR: 0.55 MG/MG CR (ref 0–0.2)
RBC # BLD AUTO: 4.91 10E6/UL (ref 4.4–5.9)
RBC URINE: 2 /HPF
SODIUM SERPL-SCNC: 140 MMOL/L (ref 135–145)
SP GR UR STRIP: 1.02 (ref 1–1.03)
UROBILINOGEN UR STRIP-MCNC: NORMAL MG/DL
WBC # BLD AUTO: 5.1 10E3/UL (ref 4–11)
WBC URINE: 1 /HPF

## 2024-06-14 PROCEDURE — 85027 COMPLETE CBC AUTOMATED: CPT | Performed by: PATHOLOGY

## 2024-06-14 PROCEDURE — 99000 SPECIMEN HANDLING OFFICE-LAB: CPT | Performed by: PATHOLOGY

## 2024-06-14 PROCEDURE — 76770 US EXAM ABDO BACK WALL COMP: CPT | Mod: GC | Performed by: RADIOLOGY

## 2024-06-14 PROCEDURE — 99204 OFFICE O/P NEW MOD 45 MIN: CPT | Mod: GC | Performed by: STUDENT IN AN ORGANIZED HEALTH CARE EDUCATION/TRAINING PROGRAM

## 2024-06-14 PROCEDURE — 82610 CYSTATIN C: CPT | Performed by: STUDENT IN AN ORGANIZED HEALTH CARE EDUCATION/TRAINING PROGRAM

## 2024-06-14 PROCEDURE — 81001 URINALYSIS AUTO W/SCOPE: CPT | Performed by: PATHOLOGY

## 2024-06-14 PROCEDURE — 36415 COLL VENOUS BLD VENIPUNCTURE: CPT | Performed by: PATHOLOGY

## 2024-06-14 PROCEDURE — 84156 ASSAY OF PROTEIN URINE: CPT | Performed by: PATHOLOGY

## 2024-06-14 PROCEDURE — 82570 ASSAY OF URINE CREATININE: CPT | Performed by: STUDENT IN AN ORGANIZED HEALTH CARE EDUCATION/TRAINING PROGRAM

## 2024-06-14 PROCEDURE — 80069 RENAL FUNCTION PANEL: CPT | Performed by: PATHOLOGY

## 2024-06-14 PROCEDURE — 99213 OFFICE O/P EST LOW 20 MIN: CPT | Performed by: STUDENT IN AN ORGANIZED HEALTH CARE EDUCATION/TRAINING PROGRAM

## 2024-06-14 ASSESSMENT — PAIN SCALES - GENERAL: PAINLEVEL: NO PAIN (0)

## 2024-06-14 NOTE — PROGRESS NOTES
"Kidney and Blood Pressure Clinic Note    Encounter Date: Jun 14, 2024     Assessment and Plan:     #Elevated creatinine, at this time etiology unknown  Baseline creatinine: 1.0 with recent increase to 1.2 -1.4.  Electrolytes: sodium and potassium WNL  Acid/Base: bicarb WNL  Bone/Mineral: calcium mildly elevated today (not chronically) and phosphorus WNL  Volume/Blood pressure: euvolemic/normotensive   Proteinuria: UPCR 0.55g/g today but previously with 24hr urine with negligible proteinuria     Interestingly, 24 hour urine collection obtained after ED visit in 3/2024 showing 6L of urine output in 24 hours. Unclear if this is related to polydypsia at that time in response to being told he was \"dry\" in the ED. Also obtained IV fluids. He does report what seems like appropriate fluid intake and urine output. Today, urine specific gravity suggests that his urine is at least moderately concentrated and labs are normal. With his history of syncope and what is documented as mild hypotension with improvement with fluid intake, suspect there may be a hemodynamic component to his elevated creatinine. At this time we will obtain renal imaging and a cardiac ECHO for further evaluation of elevated creatinine, although may need to consider additional urine testing if there is continued concern with polyuria.     -Will check cystatin C to verify renal function  -Advised to obtain BP cuff and monitor blood pressure at home   -Goal BP is <130/90  -Renal Ultrasound to eval for hydro/stones/renal anatomy  -Cardiac ECHO in the setting of syncope and prior ASD repair  -Will follow-up in 2-3mo for continued evaluation    Discussed with Dr. 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 Presenting to nephrology for evaluation of elevated creatinine.     Presented to the ED in 3/2024 with multiple syncopal episodes overnight. Mentioned worsening orthostatic symptoms over the preceding 10 days. Fell and hit his head, but CT unremarkable. Vitals showing relative hypotension which resolved with IV fluids. Labs showing increase in creatinine to 1.45. UACR/UPCR (6L timed collection) negative for proteinuria. Seen by PCP in follow up. Noted to have significant caffeine intake, and encouraged to cut back and improve hydration.     Interestingly, baseline creatinine has been ~1, but was noted to increase to 1.26 in the months preceding this ED presentation. In care everywhere noted to have creatinine 1.1 in 2018 and 1.3 in 2013. Last UA in care everywhere is dated, but was bland. He has not had any prior imaging that has evaluated his kidneys or renal vasculature.     He reports that he generally is feeling well. Over the past few months, few episodes of dizziness, although a couple  episodes of dizziness within the last week. He generally feels like he is hydrating well. Reports taking a bottle of water with \"ReLyte\" in the morning for electrolyte supplement, and will drink water while at work. Will have \"Ice\" flavored water in the day as well. He has limited his caffiene intake from 24oz + additional 5 cups down to only 24oz in the AM. Used to get up 2-3x at night to urinate, now is much less. Feels that he uses the restroom an appropriate amount of times in a day. He lifts weights 3 times a week and hydrates with this, but also feels that his " muscle mass is less than what it was prior.     - History of Hematuria: no  - Swelling: no  - Hx of UTIs: no  - Hx of stones: no  - Rashes/Joint pain: no  - Family hx of kidney disease: no  - NSAID use: 2-3x per month (although increased recently)    Review of Systems:   All organ systems were reviewed and are negative except as noted in the HPI above.    Past Medical History:   Diagnosis Date    Depression with anxiety 7/19/2016    Hernia, abdominal     History of atrial septal defect repair 3/5/2012    Overview:  repaired age 5     History of inguinal hernia repair 3/5/2012    Overview:  Age 6     Mixed hyperlipidemia 7/19/2016     Past Surgical History:   Procedure Laterality Date    HERNIA REPAIR      IR ANGIOGRAM THROUGH CATHETER FOLLOW UP  10/6/2021    IR UPPER EXTREMITY VENOGRAM LEFT  10/5/2021    IR UPPER EXTREMITY VENOGRAM LEFT  10/25/2021    IR UPPER EXTREMITY VENOGRAM LEFT  11/12/2021    TRANSAXILLARY RESECT FIRST RIB Left 10/7/2021    Procedure: LEFT TRANSAXILLARY FIRST RIB RESECTION AND SCALENECTOMY.;  Surgeon: Jovi Ellison;  Location: [facility] OR    TRANSAXILLARY RESECT FIRST RIB Right 2/23/2022    Procedure: RIGHT TRANSAXILLARY FIRST RIB RESECTION AND SCALENECTOMY;  Surgeon: Jovi Ellison;  Location: [facility] OR     Allergies   Allergen Reactions    Vicodin [Hydrocodone-Acetaminophen] Hives     Patient's Medications   New Prescriptions    No medications on file   Previous Medications    ATOMOXETINE (STRATTERA) 100 MG CAPSULE    Take 100 mg by mouth daily    ATOMOXETINE (STRATTERA) 40 MG CAPSULE    Take 40 mg by mouth daily    FENOFIBRATE (TRIGLIDE/LOFIBRA) 160 MG TABLET    Take 1 tablet (160 mg) by mouth daily    MIRTAZAPINE (REMERON) 15 MG TABLET    TAKE 0.5-1 TABLET BY MOUTH AT BEDTIME    ROSUVASTATIN (CRESTOR) 40 MG TABLET    Take 1 tablet (40 mg) by mouth daily    SERTRALINE (ZOLOFT) 100 MG TABLET    TAKE 1.5 TABLETS BY MOUTH DAILY   Modified Medications    No medications on file    Discontinued Medications    No medications on file     Family History   Problem Relation Age of Onset    Bipolar Disorder Mother     Depression Mother     Hyperlipidemia Maternal Grandmother     Hyperlipidemia Maternal Grandfather     Leukemia Paternal Grandmother     Hyperlipidemia Paternal Grandmother     Hyperlipidemia Paternal Grandfather      Family Status   Relation Name Status    Mo  Alive    Fa  Alive    Sis  Alive   No partnership data on file     Social History     Social History Narrative    Not on file     Social History     Tobacco Use    Smoking status: Never    Smokeless tobacco: Current     Types: Chew     Last attempt to quit: 1/1/2017    Tobacco comments:     Gum, ocasionally    Substance Use Topics    Alcohol use: Not Currently     Comment: none at all now        Objective:     /82   Pulse 88   Wt 101.2 kg (223 lb)   SpO2 100%   BMI 28.25 kg/m      Wt Readings from Last 3 Encounters:   06/14/24 101.2 kg (223 lb)   03/12/24 99.8 kg (220 lb)   11/08/23 97.9 kg (215 lb 12.8 oz)       Constitutional:  NAD  Head: Atraumatic, normocephalic  Eyes:  Anicteric  ENT: MMM  CV: RRR, no m/r/g  Respiratory: CTA bilaterally  GI: Abdomen soft, non-tender  : No snyder, no major abnormalities noted  Musculoskeletal:  Extremities warm and well perfused.  No edema  Skin: No jaundice  Neurologic: Speech normal.  Gait normal.  Strength intact upper and lower extremities.  Psychiatric: AOx3  Hematologic/lymphatic/immunologic:  No petechiae noted      Results:    Lab on 06/14/2024   Component Date Value Ref Range Status    Sodium 06/14/2024 140  135 - 145 mmol/L Final    Potassium 06/14/2024 4.3  3.4 - 5.3 mmol/L Final    Chloride 06/14/2024 102  98 - 107 mmol/L Final    Carbon Dioxide (CO2) 06/14/2024 26  22 - 29 mmol/L Final    Anion Gap 06/14/2024 12  7 - 15 mmol/L Final    Glucose 06/14/2024 99  70 - 99 mg/dL Final    Urea Nitrogen 06/14/2024 20.9 (H)  6.0 - 20.0 mg/dL Final    Creatinine 06/14/2024 1.30  (H)  0.67 - 1.17 mg/dL Final    GFR Estimate 06/14/2024 70  >60 mL/min/1.73m2 Final    Calcium 06/14/2024 10.2 (H)  8.6 - 10.0 mg/dL Final    Albumin 06/14/2024 4.8  3.5 - 5.2 g/dL Final    Phosphorus 06/14/2024 3.3  2.5 - 4.5 mg/dL Final    Color Urine 06/14/2024 Light Yellow  Colorless, Straw, Light Yellow, Yellow Final    Appearance Urine 06/14/2024 Clear  Clear Final    Glucose Urine 06/14/2024 Negative  Negative mg/dL Final    Bilirubin Urine 06/14/2024 Negative  Negative Final    Ketones Urine 06/14/2024 Negative  Negative mg/dL Final    Specific Gravity Urine 06/14/2024 1.018  1.003 - 1.035 Final    Blood Urine 06/14/2024 Negative  Negative Final    pH Urine 06/14/2024 6.0  5.0 - 7.0 Final    Protein Albumin Urine 06/14/2024 70 (A)  Negative mg/dL Final    Urobilinogen Urine 06/14/2024 Normal  Normal, 2.0 mg/dL Final    Nitrite Urine 06/14/2024 Negative  Negative Final    Leukocyte Esterase Urine 06/14/2024 Negative  Negative Final    Mucus Urine 06/14/2024 Present (A)  None Seen /LPF Final    RBC Urine 06/14/2024 2  <=2 /HPF Final    WBC Urine 06/14/2024 1  <=5 /HPF Final    Hyaline Casts Urine 06/14/2024 7 (H)  <=2 /LPF Final    Total Protein Urine mg/dL 06/14/2024 62.2    mg/dL Final    Total Protein Urine mg/mg Creat 06/14/2024 0.55 (H)  0.00 - 0.20 mg/mg Cr Final    Creatinine Urine mg/dL 06/14/2024 114.0  mg/dL Final    WBC Count 06/14/2024 5.1  4.0 - 11.0 10e3/uL Final    RBC Count 06/14/2024 4.91  4.40 - 5.90 10e6/uL Final    Hemoglobin 06/14/2024 14.6  13.3 - 17.7 g/dL Final    Hematocrit 06/14/2024 43.8  40.0 - 53.0 % Final    MCV 06/14/2024 89  78 - 100 fL Final    MCH 06/14/2024 29.7  26.5 - 33.0 pg Final    MCHC 06/14/2024 33.3  31.5 - 36.5 g/dL Final    RDW 06/14/2024 12.5  10.0 - 15.0 % Final    Platelet Count 06/14/2024 208  150 - 450 10e3/uL Final

## 2024-06-14 NOTE — PATIENT INSTRUCTIONS
-Please schedule a kidney ultrasound and a heart ultrasound (ECHO) today - they can help you with this at the desk and if not available today they can help to arrange at [facility]  -Would encourage hydration (generally 64 oz in a day or 2L)  -Continue to limit caffeine intake as you are.   -We will follow up in a couple months to follow-up with labs

## 2024-06-14 NOTE — NURSING NOTE
Chief Complaint   Patient presents with    RECHECK     JANET follow up. Still having some issues with feeling light headed.      Vitals:    06/14/24 0946 06/14/24 0947 06/14/24 0948 06/14/24 0949   BP: 116/82 118/82 119/83 118/82   BP Location: Left arm Left arm Left arm    Patient Position: Sitting Sitting Sitting    Cuff Size: Adult Large Adult Large Adult Large    Pulse: 88      SpO2: 100%      Weight: 101.2 kg (223 lb)          BP Readings from Last 3 Encounters:   06/14/24 118/82   03/12/24 116/77   03/12/24 122/88       /82   Pulse 88   Wt 101.2 kg (223 lb)   SpO2 100%   BMI 28.25 kg/m       Francisca Bishop

## 2024-06-14 NOTE — LETTER
"6/14/2024       RE: Pool Ontiveros  [address] [place] [address]     Dear Colleague,    Thank you for referring your patient, Pool Ontiveros, to the [facility] [place] at [facility]. Please see a copy of my visit note below.    Kidney and Blood Pressure Clinic Note    Encounter Date: Jun 14, 2024     Assessment and Plan:     #Elevated creatinine, at this time etiology unknown  Baseline creatinine: 1.0 with recent increase to 1.2 -1.4.  Electrolytes: sodium and potassium WNL  Acid/Base: bicarb WNL  Bone/Mineral: calcium mildly elevated today (not chronically) and phosphorus WNL  Volume/Blood pressure: euvolemic/normotensive   Proteinuria: UPCR 0.55g/g today but previously with 24hr urine with negligible proteinuria     Interestingly, 24 hour urine collection obtained after ED visit in 3/2024 showing 6L of urine output in 24 hours. Unclear if this is related to polydypsia at that time in response to being told he was \"dry\" in the ED. Also obtained IV fluids. He does report what seems like appropriate fluid intake and urine output. Today, urine specific gravity suggests that his urine is at least moderately concentrated and labs are normal. With his history of syncope and what is documented as mild hypotension with improvement with fluid intake, suspect there may be a hemodynamic component to his elevated creatinine. At this time we will obtain renal imaging and a cardiac ECHO for further evaluation of elevated creatinine, although may need to consider additional urine testing if there is continued concern with polyuria.     -Will check cystatin C to verify renal function  -Advised to obtain BP cuff and monitor blood pressure at home   -Goal BP is <130/90  -Renal Ultrasound to eval for hydro/stones/renal anatomy  -Cardiac ECHO in the setting of syncope and prior ASD repair  -Will follow-up in 2-3mo for continued " "evaluation    Discussed with Dr. 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 Presenting to nephrology for evaluation of elevated creatinine.     Presented to the ED in 3/2024 with multiple syncopal episodes overnight. Mentioned worsening orthostatic symptoms over the preceding 10 days. Fell and hit his head, but CT unremarkable. Vitals showing relative hypotension which resolved with IV fluids. Labs showing increase in creatinine to 1.45. UACR/UPCR (6L timed collection) negative for proteinuria. Seen by PCP in follow up. Noted to have significant caffeine intake, and encouraged to cut back and improve hydration.     Interestingly, baseline creatinine has been ~1, but was noted to increase to 1.26 in the months preceding this ED presentation. In care everywhere noted to have creatinine 1.1 in 2018 and 1.3 in 2013. Last UA in care everywhere is dated, but was bland. He has not had any prior imaging that has evaluated his kidneys or renal vasculature.     He reports that he generally is feeling well. Over the past few months, few episodes of dizziness, although a couple  episodes of dizziness within the last week. He generally feels like he is hydrating well. Reports taking a bottle of water with \"ReLyte\" in the morning for electrolyte supplement, and will drink water while at work. Will have \"Ice\" flavored " water in the day as well. He has limited his caffiene intake from 24oz + additional 5 cups down to only 24oz in the AM. Used to get up 2-3x at night to urinate, now is much less. Feels that he uses the restroom an appropriate amount of times in a day. He lifts weights 3 times a week and hydrates with this, but also feels that his muscle mass is less than what it was prior.     - History of Hematuria: no  - Swelling: no  - Hx of UTIs: no  - Hx of stones: no  - Rashes/Joint pain: no  - Family hx of kidney disease: no  - NSAID use: 2-3x per month (although increased recently)    Review of Systems:   All organ systems were reviewed and are negative except as noted in the HPI above.    Past Medical History:   Diagnosis Date    Depression with anxiety 7/19/2016    Hernia, abdominal     History of atrial septal defect repair 3/5/2012    Overview:  repaired age 5     History of inguinal hernia repair 3/5/2012    Overview:  Age 6     Mixed hyperlipidemia 7/19/2016     Past Surgical History:   Procedure Laterality Date    HERNIA REPAIR      IR ANGIOGRAM THROUGH CATHETER FOLLOW UP  10/6/2021    IR UPPER EXTREMITY VENOGRAM LEFT  10/5/2021    IR UPPER EXTREMITY VENOGRAM LEFT  10/25/2021    IR UPPER EXTREMITY VENOGRAM LEFT  11/12/2021    TRANSAXILLARY RESECT FIRST RIB Left 10/7/2021    Procedure: LEFT TRANSAXILLARY FIRST RIB RESECTION AND SCALENECTOMY.;  Surgeon: Jovi Ellison;  Location:  OR    TRANSAXILLARY RESECT FIRST RIB Right 2/23/2022    Procedure: RIGHT TRANSAXILLARY FIRST RIB RESECTION AND SCALENECTOMY;  Surgeon: Jovi Ellison;  Location:  OR     Allergies   Allergen Reactions    Vicodin [Hydrocodone-Acetaminophen] Hives     Patient's Medications   New Prescriptions    No medications on file   Previous Medications    ATOMOXETINE (STRATTERA) 100 MG CAPSULE    Take 100 mg by mouth daily    ATOMOXETINE (STRATTERA) 40 MG CAPSULE    Take 40 mg by mouth daily    FENOFIBRATE (TRIGLIDE/LOFIBRA) 160 MG  TABLET    Take 1 tablet (160 mg) by mouth daily    MIRTAZAPINE (REMERON) 15 MG TABLET    TAKE 0.5-1 TABLET BY MOUTH AT BEDTIME    ROSUVASTATIN (CRESTOR) 40 MG TABLET    Take 1 tablet (40 mg) by mouth daily    SERTRALINE (ZOLOFT) 100 MG TABLET    TAKE 1.5 TABLETS BY MOUTH DAILY   Modified Medications    No medications on file   Discontinued Medications    No medications on file     Family History   Problem Relation Age of Onset    Bipolar Disorder Mother     Depression Mother     Hyperlipidemia Maternal Grandmother     Hyperlipidemia Maternal Grandfather     Leukemia Paternal Grandmother     Hyperlipidemia Paternal Grandmother     Hyperlipidemia Paternal Grandfather      Family Status   Relation Name Status    Mo  Alive    Fa  Alive    Sis  Alive   No partnership data on file     Social History     Social History Narrative    Not on file     Social History     Tobacco Use    Smoking status: Never    Smokeless tobacco: Current     Types: Chew     Last attempt to quit: 1/1/2017    Tobacco comments:     Gum, ocasionally    Substance Use Topics    Alcohol use: Not Currently     Comment: none at all now        Objective:     /82   Pulse 88   Wt 101.2 kg (223 lb)   SpO2 100%   BMI 28.25 kg/m      Wt Readings from Last 3 Encounters:   06/14/24 101.2 kg (223 lb)   03/12/24 99.8 kg (220 lb)   11/08/23 97.9 kg (215 lb 12.8 oz)       Constitutional:  NAD  Head: Atraumatic, normocephalic  Eyes:  Anicteric  ENT: MMM  CV: RRR, no m/r/g  Respiratory: CTA bilaterally  GI: Abdomen soft, non-tender  : No snyder, no major abnormalities noted  Musculoskeletal:  Extremities warm and well perfused.  No edema  Skin: No jaundice  Neurologic: Speech normal.  Gait normal.  Strength intact upper and lower extremities.  Psychiatric: AOx3  Hematologic/lymphatic/immunologic:  No petechiae noted      Results:    Lab on 06/14/2024   Component Date Value Ref Range Status    Sodium 06/14/2024 140  135 - 145 mmol/L Final    Potassium  06/14/2024 4.3  3.4 - 5.3 mmol/L Final    Chloride 06/14/2024 102  98 - 107 mmol/L Final    Carbon Dioxide (CO2) 06/14/2024 26  22 - 29 mmol/L Final    Anion Gap 06/14/2024 12  7 - 15 mmol/L Final    Glucose 06/14/2024 99  70 - 99 mg/dL Final    Urea Nitrogen 06/14/2024 20.9 (H)  6.0 - 20.0 mg/dL Final    Creatinine 06/14/2024 1.30 (H)  0.67 - 1.17 mg/dL Final    GFR Estimate 06/14/2024 70  >60 mL/min/1.73m2 Final    Calcium 06/14/2024 10.2 (H)  8.6 - 10.0 mg/dL Final    Albumin 06/14/2024 4.8  3.5 - 5.2 g/dL Final    Phosphorus 06/14/2024 3.3  2.5 - 4.5 mg/dL Final    Color Urine 06/14/2024 Light Yellow  Colorless, Straw, Light Yellow, Yellow Final    Appearance Urine 06/14/2024 Clear  Clear Final    Glucose Urine 06/14/2024 Negative  Negative mg/dL Final    Bilirubin Urine 06/14/2024 Negative  Negative Final    Ketones Urine 06/14/2024 Negative  Negative mg/dL Final    Specific Gravity Urine 06/14/2024 1.018  1.003 - 1.035 Final    Blood Urine 06/14/2024 Negative  Negative Final    pH Urine 06/14/2024 6.0  5.0 - 7.0 Final    Protein Albumin Urine 06/14/2024 70 (A)  Negative mg/dL Final    Urobilinogen Urine 06/14/2024 Normal  Normal, 2.0 mg/dL Final    Nitrite Urine 06/14/2024 Negative  Negative Final    Leukocyte Esterase Urine 06/14/2024 Negative  Negative Final    Mucus Urine 06/14/2024 Present (A)  None Seen /LPF Final    RBC Urine 06/14/2024 2  <=2 /HPF Final    WBC Urine 06/14/2024 1  <=5 /HPF Final    Hyaline Casts Urine 06/14/2024 7 (H)  <=2 /LPF Final    Total Protein Urine mg/dL 06/14/2024 62.2    mg/dL Final    Total Protein Urine mg/mg Creat 06/14/2024 0.55 (H)  0.00 - 0.20 mg/mg Cr Final    Creatinine Urine mg/dL 06/14/2024 114.0  mg/dL Final    WBC Count 06/14/2024 5.1  4.0 - 11.0 10e3/uL Final    RBC Count 06/14/2024 4.91  4.40 - 5.90 10e6/uL Final    Hemoglobin 06/14/2024 14.6  13.3 - 17.7 g/dL Final    Hematocrit 06/14/2024 43.8  40.0 - 53.0 % Final    MCV 06/14/2024 89  78 - 100 fL Final    MCH  06/14/2024 29.7  26.5 - 33.0 pg Final    MCHC 06/14/2024 33.3  31.5 - 36.5 g/dL Final    RDW 06/14/2024 12.5  10.0 - 15.0 % Final    Platelet Count 06/14/2024 208  150 - 450 10e3/uL Final       Attestation signed by Manolo Hill MD at 6/14/2024  2:42 PM (Updated):  Physician Attestation  I, Manolo Hill MD, saw this patient and agree with the findings and plan of care as documented in this note.      Items personally reviewed/procedural attestation: vitals, labs, history, and exam.    Patient with several months of elevated creatinine - unclear if JANET (or AKD) or CKD, though certainly would have expected him to recover from JANET by now if the insult were gone. A potential insult is this unexplained syncope and if hypotension or hypoperfusion has been occurring/ongoing.     Checking a cystatin C today as well. CBC is normal. UA without hematuria. Also check renal U/S now to begin workup. He is using some degree of NSAIDs.    The patient certainly drinks a lot of fluids, and his 24hr urine (after being told he was dehydrated) showed 6L of UOP (and no proteinuria, in contrast to the 0.5 g/g seen today on spot urine that had specific gravity of 1.018 - not dilute). Serum sodium is perfectly normal at 140. Calcium is 10.2, marked as elevated but this level was previously considered WNL.     I have a degree of suspicion that this patient has a cardiac explanation for lightheadedness, his syncope that caused him to go to the ER, and this elevated creatinine. Getting a TTE. Discussed pursuing arrhythmia (?from excessive caffeine?) workup with the patient, which he can pursue with his PCP if our initial workup is negative. His polyuria may be from polydipsia (can repeat 24hr urine outside of a window where he is told to push fluids), check urine osms, etc upon follow-up, though his urine today is not dilute and has hyaline casts (suggesting UOP is not high this AM).    Return to renal clinic in 2 months.  Discussed home BP monitoring in the interim with the patient.     # elevated creatinine  # polyuria  # syncope     Manolo Hill MD      Again, thank you for allowing me to participate in the care of your patient.      Sincerely,    Manolo Hill MD

## 2024-06-19 ENCOUNTER — HOSPITAL ENCOUNTER (OUTPATIENT)
Dept: CARDIOLOGY | Facility: CLINIC | Age: 44
Discharge: HOME | End: 2024-06-19
Attending: STUDENT IN AN ORGANIZED HEALTH CARE EDUCATION/TRAINING PROGRAM | Admitting: STUDENT IN AN ORGANIZED HEALTH CARE EDUCATION/TRAINING PROGRAM
Payer: COMMERCIAL

## 2024-06-19 DIAGNOSIS — R80.0 ISOLATED PROTEINURIA WITHOUT SPECIFIC MORPHOLOGIC LESION: Primary | ICD-10-CM

## 2024-06-19 DIAGNOSIS — R55 SYNCOPE, UNSPECIFIED SYNCOPE TYPE: ICD-10-CM

## 2024-06-19 LAB — LVEF ECHO: NORMAL

## 2024-06-19 PROCEDURE — 999N000208 ECHOCARDIOGRAM COMPLETE

## 2024-06-19 PROCEDURE — 93306 TTE W/DOPPLER COMPLETE: CPT | Mod: 26 | Performed by: INTERNAL MEDICINE

## 2024-06-20 ENCOUNTER — MYC MEDICAL ADVICE (OUTPATIENT)
Dept: FAMILY MEDICINE | Facility: CLINIC | Age: 44
End: 2024-06-20
Payer: COMMERCIAL

## 2024-06-20 NOTE — TELEPHONE ENCOUNTER
Please see mychart from patient and advise on appropriate course of action.     Gena Lucas, RN    [facility] Triage Nurse    Please see results;         Echocardiogram Complete: Patient Communication     Append Comments   Seen    Hello Mr Ontiveros,     Your heart echocardiogram was normal. Have you continued to have lightheadedness? As we discussed in clinic, I think you'll need to talk to your primary care provider about whether they would want to pursue any testing for heart rhythm issues, which this echocardiogram is not able to test for.     Sincerely,     Dr. Hill, nephrology/kidneys   Written by Manolo Hill MD on 6/19/2024  3:12 PM CDT  Seen by patient Zain Ontiveros on 6/20/2024  7:19 A

## 2024-06-21 NOTE — TELEPHONE ENCOUNTER
MyChart response received from pt. Office visit appt scheduled for Thursday 6/27/2024 at 10:40am (check-in) to see Dr. Bonilla.    Shasta Hopkins,  [facility] [place] [facility]

## 2024-06-24 ENCOUNTER — LAB (OUTPATIENT)
Dept: LAB | Facility: CLINIC | Age: 44
End: 2024-06-24
Payer: COMMERCIAL

## 2024-06-24 DIAGNOSIS — R80.0 ISOLATED PROTEINURIA WITHOUT SPECIFIC MORPHOLOGIC LESION: ICD-10-CM

## 2024-06-24 LAB
ALBUMIN SERPL BCG-MCNC: 4.8 G/DL (ref 3.5–5.2)
ANION GAP SERPL CALCULATED.3IONS-SCNC: 11 MMOL/L (ref 7–15)
BUN SERPL-MCNC: 17.7 MG/DL (ref 6–20)
CALCIUM SERPL-MCNC: 9.6 MG/DL (ref 8.6–10)
CHLORIDE SERPL-SCNC: 105 MMOL/L (ref 98–107)
CREAT SERPL-MCNC: 1.17 MG/DL (ref 0.67–1.17)
DEPRECATED HCO3 PLAS-SCNC: 23 MMOL/L (ref 22–29)
EGFRCR SERPLBLD CKD-EPI 2021: 79 ML/MIN/1.73M2
GLUCOSE SERPL-MCNC: 84 MG/DL (ref 70–99)
PHOSPHATE SERPL-MCNC: 3.4 MG/DL (ref 2.5–4.5)
POTASSIUM SERPL-SCNC: 4.1 MMOL/L (ref 3.4–5.3)
SODIUM SERPL-SCNC: 139 MMOL/L (ref 135–145)
TOTAL PROTEIN SERUM FOR ELP: 7.2 G/DL (ref 6.4–8.3)

## 2024-06-24 PROCEDURE — 86334 IMMUNOFIX E-PHORESIS SERUM: CPT | Performed by: STUDENT IN AN ORGANIZED HEALTH CARE EDUCATION/TRAINING PROGRAM

## 2024-06-24 PROCEDURE — 84165 PROTEIN E-PHORESIS SERUM: CPT | Performed by: STUDENT IN AN ORGANIZED HEALTH CARE EDUCATION/TRAINING PROGRAM

## 2024-06-24 PROCEDURE — 83521 IG LIGHT CHAINS FREE EACH: CPT

## 2024-06-24 PROCEDURE — 36415 COLL VENOUS BLD VENIPUNCTURE: CPT

## 2024-06-24 PROCEDURE — 84166 PROTEIN E-PHORESIS/URINE/CSF: CPT | Performed by: STUDENT IN AN ORGANIZED HEALTH CARE EDUCATION/TRAINING PROGRAM

## 2024-06-24 PROCEDURE — 84155 ASSAY OF PROTEIN SERUM: CPT

## 2024-06-24 PROCEDURE — 80069 RENAL FUNCTION PANEL: CPT

## 2024-06-25 LAB
ALBUMIN SERPL ELPH-MCNC: 4.7 G/DL (ref 3.7–5.1)
ALPHA1 GLOB SERPL ELPH-MCNC: 0.3 G/DL (ref 0.2–0.4)
ALPHA2 GLOB SERPL ELPH-MCNC: 0.5 G/DL (ref 0.5–0.9)
B-GLOBULIN SERPL ELPH-MCNC: 0.9 G/DL (ref 0.6–1)
GAMMA GLOB SERPL ELPH-MCNC: 0.8 G/DL (ref 0.7–1.6)
KAPPA LC FREE SER-MCNC: 1.31 MG/DL (ref 0.33–1.94)
KAPPA LC FREE/LAMBDA FREE SER NEPH: 1.27 {RATIO} (ref 0.26–1.65)
LAMBDA LC FREE SERPL-MCNC: 1.03 MG/DL (ref 0.57–2.63)
M PROTEIN SERPL ELPH-MCNC: 0 G/DL
PATH REPORT.COMMENTS IMP SPEC: NORMAL
PROT PATTERN SERPL ELPH-IMP: NORMAL
PROT PATTERN SERPL IFE-IMP: NORMAL
PROT PATTERN UR ELPH-IMP: NORMAL

## 2024-06-27 ENCOUNTER — ORDERS ONLY (AUTO-RELEASED) (OUTPATIENT)
Dept: FAMILY MEDICINE | Facility: CLINIC | Age: 44
End: 2024-06-27
Payer: COMMERCIAL

## 2024-06-27 ENCOUNTER — OFFICE VISIT (OUTPATIENT)
Dept: FAMILY MEDICINE | Facility: CLINIC | Age: 44
End: 2024-06-27
Payer: COMMERCIAL

## 2024-06-27 VITALS
RESPIRATION RATE: 16 BRPM | HEART RATE: 86 BPM | OXYGEN SATURATION: 100 % | TEMPERATURE: 96.9 F | DIASTOLIC BLOOD PRESSURE: 85 MMHG | BODY MASS INDEX: 28.15 KG/M2 | HEIGHT: 75 IN | SYSTOLIC BLOOD PRESSURE: 122 MMHG | WEIGHT: 226.4 LBS

## 2024-06-27 DIAGNOSIS — I49.9 IRREGULAR HEARTBEAT: Primary | ICD-10-CM

## 2024-06-27 DIAGNOSIS — I82.629 ACUTE DEEP VEIN THROMBOSIS (DVT) OF OTHER VEIN OF UPPER EXTREMITY, UNSPECIFIED LATERALITY (H): ICD-10-CM

## 2024-06-27 DIAGNOSIS — E78.2 MIXED HYPERLIPIDEMIA: ICD-10-CM

## 2024-06-27 DIAGNOSIS — G54.0 TOS (THORACIC OUTLET SYNDROME): ICD-10-CM

## 2024-06-27 DIAGNOSIS — I49.9 IRREGULAR HEARTBEAT: ICD-10-CM

## 2024-06-27 PROCEDURE — G2211 COMPLEX E/M VISIT ADD ON: HCPCS | Performed by: FAMILY MEDICINE

## 2024-06-27 PROCEDURE — 93000 ELECTROCARDIOGRAM COMPLETE: CPT | Performed by: FAMILY MEDICINE

## 2024-06-27 PROCEDURE — 99214 OFFICE O/P EST MOD 30 MIN: CPT | Performed by: FAMILY MEDICINE

## 2024-06-27 ASSESSMENT — PAIN SCALES - GENERAL: PAINLEVEL: NO PAIN (0)

## 2024-06-27 NOTE — PROGRESS NOTES
The longitudinal plan of care for the diagnosis(es)/condition(s) as documented were addressed during this visit. Due to the added complexity in care, I will continue to support Zain in the subsequent management and with ongoing continuity of care.

## 2024-06-27 NOTE — PROGRESS NOTES
"  Assessment & Plan     Irregular heartbeat  Was told from nephrology after echocardiogram, EKG was done today and it normal, has no clinical sx, will have him to check on Zio patch monitoring   - EKG 12-lead complete w/read - Clinics  - ZIO PATCH MAIL OUT; Future    Acute deep vein thrombosis (DVT) of other vein of upper extremity, unspecified laterality (H)  Stable, has no sx concerning DVT nor TOS    Mixed hyperlipidemia  Stable     TOS (thoracic outlet syndrome)  Stable       FUTURE APPOINTMENTS:       - Follow-up visit if zio patch monitoring showed positive finding     Subjective   Zain is a 44 year old, presenting for the following health issues:  Heart Problem        6/27/2024    10:41 AM   Additional Questions   Roomed by Ana Paula BECK     Heart Problem    History of Present Illness       Vascular Disease:  He presents for follow up of vascular disease.     He never takes nitroglycerin. He is not taking daily aspirin.    He eats 2-3 servings of fruits and vegetables daily.He consumes 1 sweetened beverage(s) daily.He exercises with enough effort to increase his heart rate 20 to 29 minutes per day.  He exercises with enough effort to increase his heart rate 3 or less days per week.   He is taking medications regularly.     Pt had an appointment a couple of weeks ago with his kidney doctor and had an ultrasound of his kidney and echocardiogram done on the 16th and noticed he had an irregular heart beat and should follow up with PCP.         Review of Systems  Constitutional, HEENT, cardiovascular, pulmonary, GI, , musculoskeletal, neuro, skin, endocrine and psych systems are negative, except as otherwise noted.      Objective    /85 (BP Location: Left arm, Patient Position: Sitting, Cuff Size: Adult Large)   Pulse 86   Temp 96.9  F (36.1  C) (Tympanic)   Resp 16   Ht 1.893 m (6' 2.53\")   Wt 102.7 kg (226 lb 6.4 oz)   SpO2 100%   BMI 28.66 kg/m    Body mass index is 28.66 kg/m .  Physical Exam "   GENERAL: alert and no distress  EYES: Eyes grossly normal to inspection, PERRL and conjunctivae and sclerae normal  HENT: ear canals and TM's normal, nose and mouth without ulcers or lesions  NECK: no adenopathy, no asymmetry, masses, or scars  RESP: lungs clear to auscultation - no rales, rhonchi or wheezes  CV: regular rate and rhythm, normal S1 S2, no S3 or S4, no murmur, click or rub, no peripheral edema  ABDOMEN: soft, nontender, no hepatosplenomegaly, no masses and bowel sounds normal  MS: no gross musculoskeletal defects noted, no edema  SKIN: no suspicious lesions or rashes  NEURO: Normal strength and tone, mentation intact and speech normal  BACK: no CVA tenderness, no paralumbar tenderness            Signed Electronically by: Paco Bonilla MD

## 2024-07-19 PROCEDURE — 93244 EXT ECG>48HR<7D REV&INTERPJ: CPT | Performed by: INTERNAL MEDICINE

## 2024-08-08 ENCOUNTER — DOCUMENTATION ONLY (OUTPATIENT)
Dept: MULTI SPECIALTY CLINIC | Facility: CLINIC | Age: 44
End: 2024-08-08
Payer: COMMERCIAL

## 2024-08-08 NOTE — PROGRESS NOTES
Pool Ontiveros has an upcoming lab appointment:    Future Appointments   Date Time Provider Department Center   8/14/2024  8:30 AM [facility]   8/16/2024 12:30 PM Manolo Hill MD [facility]     Patient is scheduled for the following lab(s): pre-appt labs    There is no order available. Please review and place future orders as appropriate.    Ryanne Baez

## 2024-08-09 DIAGNOSIS — R79.89 ELEVATED SERUM CREATININE: Primary | ICD-10-CM

## 2024-08-13 DIAGNOSIS — R35.89 POLYURIA: Primary | ICD-10-CM

## 2024-08-14 ENCOUNTER — LAB (OUTPATIENT)
Dept: LAB | Facility: CLINIC | Age: 44
End: 2024-08-14
Payer: COMMERCIAL

## 2024-08-14 DIAGNOSIS — R79.89 ELEVATED SERUM CREATININE: ICD-10-CM

## 2024-08-14 DIAGNOSIS — R35.89 POLYURIA: ICD-10-CM

## 2024-08-14 LAB
ALBUMIN MFR UR ELPH: <6 MG/DL
ALBUMIN SERPL BCG-MCNC: 4.6 G/DL (ref 3.5–5.2)
ALBUMIN UR-MCNC: NEGATIVE MG/DL
ANION GAP SERPL CALCULATED.3IONS-SCNC: 10 MMOL/L (ref 7–15)
APPEARANCE UR: CLEAR
BILIRUB UR QL STRIP: NEGATIVE
BUN SERPL-MCNC: 11.8 MG/DL (ref 6–20)
CALCIUM SERPL-MCNC: 9.3 MG/DL (ref 8.8–10.4)
CHLORIDE SERPL-SCNC: 105 MMOL/L (ref 98–107)
COLOR UR AUTO: YELLOW
CREAT SERPL-MCNC: 1.21 MG/DL (ref 0.67–1.17)
CREAT UR-MCNC: 88.7 MG/DL
CREAT UR-MCNC: 88.7 MG/DL
EGFRCR SERPLBLD CKD-EPI 2021: 76 ML/MIN/1.73M2
ERYTHROCYTE [DISTWIDTH] IN BLOOD BY AUTOMATED COUNT: 12.5 % (ref 10–15)
GLUCOSE SERPL-MCNC: 88 MG/DL (ref 70–99)
GLUCOSE UR STRIP-MCNC: NEGATIVE MG/DL
HCO3 SERPL-SCNC: 26 MMOL/L (ref 22–29)
HCT VFR BLD AUTO: 42.3 % (ref 40–53)
HGB BLD-MCNC: 14.4 G/DL (ref 13.3–17.7)
HGB UR QL STRIP: NEGATIVE
INR PPP: 0.87 (ref 0.85–1.15)
KETONES UR STRIP-MCNC: NEGATIVE MG/DL
LEUKOCYTE ESTERASE UR QL STRIP: NEGATIVE
MCH RBC QN AUTO: 30.6 PG (ref 26.5–33)
MCHC RBC AUTO-ENTMCNC: 34 G/DL (ref 31.5–36.5)
MCV RBC AUTO: 90 FL (ref 78–100)
MICROALBUMIN UR-MCNC: <12 MG/L
MICROALBUMIN/CREAT UR: NORMAL MG/G{CREAT}
NITRATE UR QL: NEGATIVE
OSMOLALITY UR: 348 MMOL/KG (ref 100–1200)
PH UR STRIP: 6.5 [PH] (ref 5–7)
PHOSPHATE SERPL-MCNC: 2.6 MG/DL (ref 2.5–4.5)
PLATELET # BLD AUTO: 213 10E3/UL (ref 150–450)
POTASSIUM SERPL-SCNC: 3.9 MMOL/L (ref 3.4–5.3)
POTASSIUM UR-SCNC: 19.8 MMOL/L
PROT/CREAT 24H UR: NORMAL MG/G{CREAT}
RBC # BLD AUTO: 4.7 10E6/UL (ref 4.4–5.9)
RBC #/AREA URNS AUTO: NORMAL /HPF
SODIUM SERPL-SCNC: 141 MMOL/L (ref 135–145)
SODIUM UR-SCNC: 72 MMOL/L
SP GR UR STRIP: 1.01 (ref 1–1.03)
UROBILINOGEN UR STRIP-ACNC: 0.2 E.U./DL
WBC # BLD AUTO: 5.5 10E3/UL (ref 4–11)
WBC #/AREA URNS AUTO: NORMAL /HPF

## 2024-08-14 PROCEDURE — 82043 UR ALBUMIN QUANTITATIVE: CPT

## 2024-08-14 PROCEDURE — 84300 ASSAY OF URINE SODIUM: CPT

## 2024-08-14 PROCEDURE — 80069 RENAL FUNCTION PANEL: CPT

## 2024-08-14 PROCEDURE — 84156 ASSAY OF PROTEIN URINE: CPT

## 2024-08-14 PROCEDURE — 83935 ASSAY OF URINE OSMOLALITY: CPT

## 2024-08-14 PROCEDURE — 36415 COLL VENOUS BLD VENIPUNCTURE: CPT

## 2024-08-14 PROCEDURE — 81001 URINALYSIS AUTO W/SCOPE: CPT

## 2024-08-14 PROCEDURE — 85027 COMPLETE CBC AUTOMATED: CPT

## 2024-08-14 PROCEDURE — 82570 ASSAY OF URINE CREATININE: CPT

## 2024-08-14 PROCEDURE — 85610 PROTHROMBIN TIME: CPT

## 2024-08-14 PROCEDURE — 84133 ASSAY OF URINE POTASSIUM: CPT

## 2024-09-25 ENCOUNTER — PATIENT OUTREACH (OUTPATIENT)
Dept: CARE COORDINATION | Facility: CLINIC | Age: 44
End: 2024-09-25
Payer: COMMERCIAL

## 2024-10-09 ENCOUNTER — PATIENT OUTREACH (OUTPATIENT)
Dept: CARE COORDINATION | Facility: CLINIC | Age: 44
End: 2024-10-09
Payer: COMMERCIAL

## 2024-11-01 ENCOUNTER — LAB (OUTPATIENT)
Dept: LAB | Facility: CLINIC | Age: 44
End: 2024-11-01
Payer: COMMERCIAL

## 2024-11-01 DIAGNOSIS — E78.2 MIXED HYPERLIPIDEMIA: ICD-10-CM

## 2024-11-01 LAB
ALBUMIN SERPL BCG-MCNC: 4.5 G/DL (ref 3.5–5.2)
ALP SERPL-CCNC: 38 U/L (ref 40–150)
ALT SERPL W P-5'-P-CCNC: 25 U/L (ref 0–70)
ANION GAP SERPL CALCULATED.3IONS-SCNC: 10 MMOL/L (ref 7–15)
APO A-I SERPL-MCNC: <6 MG/DL
AST SERPL W P-5'-P-CCNC: 29 U/L (ref 0–45)
BILIRUB SERPL-MCNC: 0.3 MG/DL
BUN SERPL-MCNC: 15.4 MG/DL (ref 6–20)
CALCIUM SERPL-MCNC: 9.6 MG/DL (ref 8.8–10.4)
CHLORIDE SERPL-SCNC: 107 MMOL/L (ref 98–107)
CREAT SERPL-MCNC: 1.4 MG/DL (ref 0.67–1.17)
CRP SERPL HS-MCNC: <0.15 MG/L
EGFRCR SERPLBLD CKD-EPI 2021: 64 ML/MIN/1.73M2
EST. AVERAGE GLUCOSE BLD GHB EST-MCNC: 114 MG/DL
GLUCOSE SERPL-MCNC: 100 MG/DL (ref 70–99)
HBA1C MFR BLD: 5.6 % (ref 0–5.6)
HCO3 SERPL-SCNC: 24 MMOL/L (ref 22–29)
POTASSIUM SERPL-SCNC: 4.2 MMOL/L (ref 3.4–5.3)
PROT SERPL-MCNC: 7 G/DL (ref 6.4–8.3)
SODIUM SERPL-SCNC: 141 MMOL/L (ref 135–145)

## 2024-11-01 PROCEDURE — 83695 ASSAY OF LIPOPROTEIN(A): CPT

## 2024-11-01 PROCEDURE — 80053 COMPREHEN METABOLIC PANEL: CPT

## 2024-11-01 PROCEDURE — 36415 COLL VENOUS BLD VENIPUNCTURE: CPT

## 2024-11-01 PROCEDURE — 86141 C-REACTIVE PROTEIN HS: CPT

## 2024-11-01 PROCEDURE — 83036 HEMOGLOBIN GLYCOSYLATED A1C: CPT

## 2024-11-01 PROCEDURE — 83704 LIPOPROTEIN BLD QUAN PART: CPT | Mod: 90

## 2024-11-01 PROCEDURE — 99000 SPECIMEN HANDLING OFFICE-LAB: CPT

## 2024-11-05 LAB
CHOLEST SERPL-MCNC: 111 MG/DL
HDL SERPL QN: 8.7 NM
HDL SERPL-SCNC: 39.6 UMOL/L
HDLC SERPL-MCNC: 59 MG/DL
HLD.LARGE SERPL-SCNC: 4.1 UMOL/L
LDL SERPL QN: 20.6 NM
LDL SERPL-SCNC: 650 NMOL/L
LDL SMALL SERPL-SCNC: 334 NMOL/L
LDLC SERPL CALC-MCNC: 30 MG/DL
PATHOLOGY STUDY: ABNORMAL
TRIGL SERPL-MCNC: 111 MG/DL
VLDL LARGE SERPL-SCNC: 7.8 NMOL/L
VLDL SERPL QN: 54.3 NM

## 2024-11-07 ENCOUNTER — TELEPHONE (OUTPATIENT)
Dept: OTHER | Facility: CLINIC | Age: 44
End: 2024-11-07
Payer: COMMERCIAL

## 2024-11-07 NOTE — TELEPHONE ENCOUNTER
Patient called to schedule follow up with Dr. Street. Appointment notes do not indicate if appointment should be in clinic or if the patient can be seen virtually. Patient's preference is for a virtual appointment. Please route back to scheduling and advise.

## 2024-11-07 NOTE — TELEPHONE ENCOUNTER
Hx TOS, hyperlipidemia    11/08/2023 LOV w/ Dr. Street     Patient completed labs ordered by Dr. Street 11/01/2024.    Routing to Dr. Street to advise.

## 2024-11-19 ENCOUNTER — VIRTUAL VISIT (OUTPATIENT)
Dept: OTHER | Facility: CLINIC | Age: 44
End: 2024-11-19
Attending: INTERNAL MEDICINE
Payer: COMMERCIAL

## 2024-11-19 DIAGNOSIS — E78.5 HYPERLIPIDEMIA LDL GOAL <100: ICD-10-CM

## 2024-11-19 DIAGNOSIS — Z13.1 SCREENING FOR DIABETES MELLITUS: Primary | ICD-10-CM

## 2024-11-19 DIAGNOSIS — E78.2 MIXED HYPERLIPIDEMIA: ICD-10-CM

## 2024-11-19 DIAGNOSIS — I82.629 ACUTE DEEP VEIN THROMBOSIS (DVT) OF OTHER VEIN OF UPPER EXTREMITY, UNSPECIFIED LATERALITY (H): ICD-10-CM

## 2024-11-19 DIAGNOSIS — G54.0 TOS (THORACIC OUTLET SYNDROME): ICD-10-CM

## 2024-11-19 PROCEDURE — 99443 PR PHYSICIAN TELEPHONE EVALUATION 21-30 MIN: CPT | Mod: 93 | Performed by: INTERNAL MEDICINE

## 2024-11-19 RX ORDER — FENOFIBRATE 160 MG/1
160 TABLET ORAL DAILY
Qty: 90 TABLET | Refills: 3 | Status: SHIPPED | OUTPATIENT
Start: 2024-11-19

## 2024-11-19 RX ORDER — VILOXAZINE HYDROCHLORIDE 200 MG/1
200 CAPSULE, EXTENDED RELEASE ORAL DAILY
COMMUNITY
Start: 2024-10-22

## 2024-11-19 RX ORDER — ROSUVASTATIN CALCIUM 40 MG/1
40 TABLET, COATED ORAL DAILY
Qty: 90 TABLET | Refills: 3 | Status: SHIPPED | OUTPATIENT
Start: 2024-11-19

## 2024-11-19 NOTE — PROGRESS NOTES
VASCULAR MEDICINE FOLLOW UP VISIT    A/P:       1.  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 D dimer was normal.   -Disontinued Xarelto on his own, the d dimer was drawn after he had been off of it for  one month. Rechecked d dimer again 6/9/22, and it was normal.  -Stop checking d dimer. Stay off of AC. Use baby ASA lifelong.            2.  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       -His triglycerides have been greater than 500.  His direct LDL was 125 when they were elevated that high.    -Started on Crestor 20 mg daily and Fenofibrate 160 mg daily in 10/2021.   -Advanced lipid testing of 11/01/2024 revealed LDL-C of 30, LDL-P of 650, cardiac CRP of <0.15, and Lp(a) of <6.    -He is having fatigue. I advised he hold fenofibrate for two weeks and continue on Crestor. While on Crestor monotherapy, if fatigue goes away, reintroduce fenofibrate. If fatigue recurs when reintroduced, stop fenofibrate and RTC to see me. If holding fenofibrate has no effect on the fatigue, then hold Crestor for two weeks while on fenofibrate monotherapy, and reintroduce it. If fatigue goes away on fenofibrate monotherapy and returns while on both drugs, then RTC. If no effect with the above, then stop both drugs for two weeks. If fatigue goes away off of both drugs, RTC to see me.  -Otherwise, continue meds without change. RTC one year for repeat labs and refills.              23 minutes total medical care on today's date.    MD location: office  Pt location: home    Phone call start: 14:10  Phone call end: 14:33        HPI:    Pool Ontiveros is a very pleasant 44 year old male with a history of hyperlipidemia and bipolar disorder well managed  with sertraline.  For most of his adult life he has lifted weights. He took a 6 months break from lifting, but then had started back up.  When he  started back up he felt his left arm became tight. He presented to the ED where he was noted to to have an occlusive left subclavian and axillary thrombosis as well as superficial left cephalic vein thrombosis.  He was started on IV heparin and taken to the Interventional Radiology suite for catheter-directed lysis and mechanical thrombectomy. Exam was significant for thoracic outlet syndrome and Dr. Ellison took him to the OR for left first rib resection and scalenectomy on 10/7/21. He was sent home on Xarelto 15 mg BID.      He returned for a venogram on 10/25/21 where the left subclavian vein was occluded and there were large collaterals explaining his minimal swelling.  Dr. Zamarripa was able to easily cannulate the subclavian vein.  Sequential angioplasty was performed up to a 10 mm balloon with good results and good flow.  Minimal irregularity noted on the vein.      On 11/3/21 he called with significant drainage from incision site. He came in and was noted to have unusual medial breakdown of incision.  It was suspected that he may have had an underlying seroma in the setting of primarily serosanguineous fluid. Area was debrided and reapproximated with nylon sutures.     Repeat venogram on 2/21/22 with patent veins. He stopped Xarelto on his own. D dimer one month later was normal.      Review Of Systems  General: fatigue  Skin: negative  Eyes: negative  Ears/Nose/Throat: negative  Respiratory: No shortness of breath, dyspnea on exertion, cough, or hemoptysis  Cardiovascular: negative  Gastrointestinal: negative  Genitourinary: negative  Musculoskeletal: negative  Neurologic: negative  Psychiatric: negative  Hematologic/Lymphatic/Immunologic: negative  Endocrine: negative      PAST MEDICAL HISTORY:                  Past Medical History:   Diagnosis Date    Depression with anxiety 7/19/2016    Hernia, abdominal     History of atrial septal defect repair 3/5/2012    Overview:  repaired age 5     History of inguinal  hernia repair 3/5/2012    Overview:  Age 6     Mixed hyperlipidemia 7/19/2016       PAST SURGICAL HISTORY:                  Past Surgical History:   Procedure Laterality Date    HERNIA REPAIR      IR ANGIOGRAM THROUGH CATHETER FOLLOW UP  10/6/2021    IR UPPER EXTREMITY VENOGRAM LEFT  10/5/2021    IR UPPER EXTREMITY VENOGRAM LEFT  10/25/2021    IR UPPER EXTREMITY VENOGRAM LEFT  11/12/2021    TRANSAXILLARY RESECT FIRST RIB Left 10/7/2021    Procedure: LEFT TRANSAXILLARY FIRST RIB RESECTION AND SCALENECTOMY.;  Surgeon: Jovi Ellison;  Location:  OR    TRANSAXILLARY RESECT FIRST RIB Right 2/23/2022    Procedure: RIGHT TRANSAXILLARY FIRST RIB RESECTION AND SCALENECTOMY;  Surgeon: Jovi Ellison;  Location:  OR       CURRENT MEDICATIONS:                  Current Outpatient Medications   Medication Sig Dispense Refill    fenofibrate (TRIGLIDE/LOFIBRA) 160 MG tablet Take 1 tablet (160 mg) by mouth daily. 90 tablet 3    mirtazapine (REMERON) 15 MG tablet TAKE 0.5-1 TABLET BY MOUTH AT BEDTIME      QELBREE 200 MG CP24 capsule Take 200 mg by mouth daily.      rosuvastatin (CRESTOR) 40 MG tablet Take 1 tablet (40 mg) by mouth daily. 90 tablet 3    sertraline (ZOLOFT) 100 MG tablet TAKE 1.5 TABLETS BY MOUTH DAILY 135 tablet 0       ALLERGIES:                  Allergies   Allergen Reactions    Vicodin [Hydrocodone-Acetaminophen] Hives       SOCIAL HISTORY:                  Social History     Socioeconomic History    Marital status:      Spouse name: Not on file    Number of children: Not on file    Years of education: Not on file    Highest education level: Not on file   Occupational History    Not on file   Tobacco Use    Smoking status: Never    Smokeless tobacco: Current     Types: Chew     Last attempt to quit: 1/1/2017    Tobacco comments:     Gum, ocasionally    Vaping Use    Vaping status: Never Used   Substance and Sexual Activity    Alcohol use: Not Currently     Comment: none at all now      Drug use: No    Sexual activity: Yes     Partners: Female   Other Topics Concern    Parent/sibling w/ CABG, MI or angioplasty before 65F 55M? Not Asked   Social History Narrative    Not on file     Social Drivers of Health     Financial Resource Strain: Low Risk  (10/25/2023)    Financial Resource Strain     Within the past 12 months, have you or your family members you live with been unable to get utilities (heat, electricity) when it was really needed?: No   Food Insecurity: No Food Insecurity (11/14/2024)    Received from [facility]    Hunger Vital Sign     Worried About Running Out of Food in the Last Year: Never true     Ran Out of Food in the Last Year: Never true   Transportation Needs: No Transportation Needs (11/14/2024)    Received from [facility]    PRAPARE - Transportation     Lack of Transportation (Medical): No     Lack of Transportation (Non-Medical): No   Physical Activity: Sufficiently Active (11/14/2024)    Received from [facility]    Exercise Vital Sign     Days of Exercise per Week: 4 days     Minutes of Exercise per Session: 40 min   Stress: Not on file   Social Connections: Not on file   Interpersonal Safety: Low Risk  (6/27/2024)    Interpersonal Safety     Do you feel physically and emotionally safe where you currently live?: Yes     Within the past 12 months, have you been hit, slapped, kicked or otherwise physically hurt by someone?: No     Within the past 12 months, have you been humiliated or emotionally abused in other ways by your partner or ex-partner?: No   Housing Stability: Low Risk  (11/14/2024)    Received from [facility]    Housing Stability     What is your living situation today?: I have a steady place to live       FAMILY HISTORY:                   Family History   Problem Relation Age of Onset    Bipolar Disorder Mother     Depression Mother     Hyperlipidemia Maternal Grandmother     Hyperlipidemia Maternal Grandfather     Leukemia Paternal Grandmother     Hyperlipidemia  Paternal Grandmother     Hyperlipidemia Paternal Grandfather          Physical exam was not undertaken as this was a billable telephone encounter.

## 2024-11-19 NOTE — PROGRESS NOTES
Zain is a 44 year old who is being evaluated via a billable telephone visit.    What phone number would you like to be contacted at? [phone]  How would you like to obtain your AVS? MyChart  Originating Location (pt. Location): Home    Distant Location (provider location):  On-site      Brigid Harrell MA

## 2024-12-05 ENCOUNTER — PATIENT OUTREACH (OUTPATIENT)
Dept: NEPHROLOGY | Facility: CLINIC | Age: 44
End: 2024-12-05
Payer: COMMERCIAL

## 2024-12-05 NOTE — PROGRESS NOTES
12/5- Called Zain to see if he's going to keep his appt w/[facility] as he saw nephrology at [place] a couple of weeks ago. I spoke to Zain and he stated I can go ahead and cancel upcoming appt w/Dr. Hill. Dr. Hill updated as well.  Wesley RAMIREZ RN  Nephrology care coordinator  [facility]

## 2024-12-05 NOTE — TELEPHONE ENCOUNTER
ALIN to [facility]. IR Upr Ext Venogram leonela/Dallin.    Possibility 11/1/2021 7:30am    Joy RAMIREZ   
Pt was scheduled for left arm venogram on 10/19/21 and this was ultimately cancelled as patient did not have COVID-19 testing completed.    Routing to surgery scheduling to contact patient and coordinate the following:    Left UE venogram with Dr. Zamarripa.    COVID-19 test within 4 days of the procedure.    Kecia Zamudio, DAHLIAN, RN-[facility] [place]    
Scheduled by GERALD as follows:    10/20/2021 Covid  10/25/2021 IR Venogram    Joy RAMIREZ   
[Follow-Up: _____] : a [unfilled] follow-up visit

## 2024-12-22 ENCOUNTER — HEALTH MAINTENANCE LETTER (OUTPATIENT)
Age: 44
End: 2024-12-22

## 2024-12-31 ENCOUNTER — MYC MEDICAL ADVICE (OUTPATIENT)
Dept: OTHER | Facility: CLINIC | Age: 44
End: 2024-12-31
Payer: COMMERCIAL

## 2024-12-31 DIAGNOSIS — G54.0 TOS (THORACIC OUTLET SYNDROME): Primary | ICD-10-CM

## 2024-12-31 NOTE — TELEPHONE ENCOUNTER
Please see Xpresso message below and advise.    Minoo SCHMIDT, RN    [facility]  Vascular [facility]  Office: [phone]  Fax: [phone]

## 2024-12-31 NOTE — TELEPHONE ENCOUNTER
LM for patient to call us back to schedule:    In person follow up   Please schedule this next available     Appt note: Follow up to discuss arm pain with TOS

## 2024-12-31 NOTE — TELEPHONE ENCOUNTER
Please see messages below.    Routing to scheduling to coordinate the following:    In person follow up   Please schedule this next available     Appt note: Follow up to discuss arm pain with BENI SCHMIDT, RN    [facility]  Vascular [facility]  Office: [phone]  Fax: [phone]

## 2025-01-06 NOTE — TELEPHONE ENCOUNTER
Left second attempt voicemail for patient to call back to schedule appointment(s), provided telephone number for patient to call back to schedule.      Brigid Harrell MA

## (undated) DEVICE — ESU ELEC BLADE 6" COATED E1450-6

## (undated) DEVICE — SU VICRYL 2-0 CT-1 27" J339H

## (undated) DEVICE — ESU GROUND PAD UNIVERSAL W/O CORD

## (undated) DEVICE — PACK MAJOR SBA15MAFSI

## (undated) DEVICE — Device

## (undated) DEVICE — DRAIN JACKSON PRATT CHANNEL 19FR ROUND HUBLESS SIL JP-2230

## (undated) DEVICE — SU PROLENE 5-0 C-1DA 36" 8720H

## (undated) DEVICE — LINEN TOWEL PACK X5 5464

## (undated) DEVICE — DRAIN JACKSON PRATT RESERVOIR 100ML SU130-1305

## (undated) DEVICE — SU SILK 2-0 FSL 18" 677G

## (undated) DEVICE — SOL NACL 0.9% IRRIG 1000ML BOTTLE 2F7124

## (undated) DEVICE — SU SILK 2-0 TIE 24" SA75H

## (undated) DEVICE — SOL WATER IRRIG 1000ML BOTTLE 2F7114

## (undated) DEVICE — DRAPE IOBAN INCISE 23X17" 6650EZ

## (undated) DEVICE — DRSG STERI STRIP 1/2X4" R1547

## (undated) DEVICE — SU MONOCRYL 4-0 PS-2 18" UND Y496G

## (undated) DEVICE — PREP CHLORAPREP 26ML TINTED ORANGE  260815

## (undated) DEVICE — BLADE KNIFE SURG 15 371115

## (undated) DEVICE — SU VICRYL 3-0 SH 27" J316H

## (undated) DEVICE — MANIFOLD NEPTUNE 4 PORT 700-20

## (undated) DEVICE — DRAPE LAP W/ARMBOARD 29410

## (undated) DEVICE — SUCTION CANISTER MEDIVAC LINER 3000ML W/LID 65651-530

## (undated) DEVICE — ESU ELEC BLADE 6" COATED/INSULATED E1455-6

## (undated) DEVICE — TUBING SUCTION 12"X1/4" N612

## (undated) DEVICE — GLOVE PROTEXIS W/NEU-THERA 7.5  2D73TE75

## (undated) DEVICE — DRAPE STOCKINETTE IMPERVIOUS 12" 1587

## (undated) RX ORDER — HEPARIN SODIUM 1000 [USP'U]/ML
INJECTION, SOLUTION INTRAVENOUS; SUBCUTANEOUS
Status: DISPENSED
Start: 2021-10-06

## (undated) RX ORDER — FENTANYL CITRATE 50 UG/ML
INJECTION, SOLUTION INTRAMUSCULAR; INTRAVENOUS
Status: DISPENSED
Start: 2021-10-07

## (undated) RX ORDER — HEPARIN SODIUM 200 [USP'U]/100ML
INJECTION, SOLUTION INTRAVENOUS
Status: DISPENSED
Start: 2021-10-05

## (undated) RX ORDER — NEOSTIGMINE METHYLSULFATE 1 MG/ML
VIAL (ML) INJECTION
Status: DISPENSED
Start: 2021-10-07

## (undated) RX ORDER — LIDOCAINE HYDROCHLORIDE 10 MG/ML
INJECTION, SOLUTION INFILTRATION; PERINEURAL
Status: DISPENSED
Start: 2021-10-25

## (undated) RX ORDER — FENTANYL CITRATE 50 UG/ML
INJECTION, SOLUTION INTRAMUSCULAR; INTRAVENOUS
Status: DISPENSED
Start: 2021-10-05

## (undated) RX ORDER — CEFAZOLIN SODIUM/WATER 2 G/20 ML
SYRINGE (ML) INTRAVENOUS
Status: DISPENSED
Start: 2022-02-23

## (undated) RX ORDER — DEXAMETHASONE SODIUM PHOSPHATE 4 MG/ML
INJECTION, SOLUTION INTRA-ARTICULAR; INTRALESIONAL; INTRAMUSCULAR; INTRAVENOUS; SOFT TISSUE
Status: DISPENSED
Start: 2022-02-23

## (undated) RX ORDER — LIDOCAINE HYDROCHLORIDE 20 MG/ML
INJECTION, SOLUTION EPIDURAL; INFILTRATION; INTRACAUDAL; PERINEURAL
Status: DISPENSED
Start: 2022-02-23

## (undated) RX ORDER — HEPARIN SODIUM 200 [USP'U]/100ML
INJECTION, SOLUTION INTRAVENOUS
Status: DISPENSED
Start: 2021-10-25

## (undated) RX ORDER — HEPARIN SODIUM 1000 [USP'U]/ML
INJECTION, SOLUTION INTRAVENOUS; SUBCUTANEOUS
Status: DISPENSED
Start: 2021-10-07

## (undated) RX ORDER — HYDROMORPHONE HYDROCHLORIDE 1 MG/ML
INJECTION, SOLUTION INTRAMUSCULAR; INTRAVENOUS; SUBCUTANEOUS
Status: DISPENSED
Start: 2022-02-23

## (undated) RX ORDER — PROPOFOL 10 MG/ML
INJECTION, EMULSION INTRAVENOUS
Status: DISPENSED
Start: 2022-02-23

## (undated) RX ORDER — HEPARIN SODIUM 10000 [USP'U]/100ML
INJECTION, SOLUTION INTRAVENOUS
Status: DISPENSED
Start: 2021-10-05

## (undated) RX ORDER — DEXAMETHASONE SODIUM PHOSPHATE 4 MG/ML
INJECTION, SOLUTION INTRA-ARTICULAR; INTRALESIONAL; INTRAMUSCULAR; INTRAVENOUS; SOFT TISSUE
Status: DISPENSED
Start: 2021-10-07

## (undated) RX ORDER — HEPARIN SODIUM 200 [USP'U]/100ML
INJECTION, SOLUTION INTRAVENOUS
Status: DISPENSED
Start: 2021-10-06

## (undated) RX ORDER — HEPARIN SODIUM 200 [USP'U]/100ML
INJECTION, SOLUTION INTRAVENOUS
Status: DISPENSED
Start: 2021-11-12

## (undated) RX ORDER — FENTANYL CITRATE 50 UG/ML
INJECTION, SOLUTION INTRAMUSCULAR; INTRAVENOUS
Status: DISPENSED
Start: 2021-10-25

## (undated) RX ORDER — ONDANSETRON 2 MG/ML
INJECTION INTRAMUSCULAR; INTRAVENOUS
Status: DISPENSED
Start: 2022-02-23

## (undated) RX ORDER — BUPIVACAINE HYDROCHLORIDE 5 MG/ML
INJECTION, SOLUTION EPIDURAL; INTRACAUDAL
Status: DISPENSED
Start: 2021-10-07

## (undated) RX ORDER — FENTANYL CITRATE 50 UG/ML
INJECTION, SOLUTION INTRAMUSCULAR; INTRAVENOUS
Status: DISPENSED
Start: 2021-10-06

## (undated) RX ORDER — BUPIVACAINE HYDROCHLORIDE 5 MG/ML
INJECTION, SOLUTION EPIDURAL; INTRACAUDAL
Status: DISPENSED
Start: 2022-02-23

## (undated) RX ORDER — HYDROMORPHONE HCL IN WATER/PF 6 MG/30 ML
PATIENT CONTROLLED ANALGESIA SYRINGE INTRAVENOUS
Status: DISPENSED
Start: 2021-10-07

## (undated) RX ORDER — KETOROLAC TROMETHAMINE 30 MG/ML
INJECTION, SOLUTION INTRAMUSCULAR; INTRAVENOUS
Status: DISPENSED
Start: 2021-10-07

## (undated) RX ORDER — PROPOFOL 10 MG/ML
INJECTION, EMULSION INTRAVENOUS
Status: DISPENSED
Start: 2021-10-07

## (undated) RX ORDER — CEFAZOLIN SODIUM 2 G/100ML
INJECTION, SOLUTION INTRAVENOUS
Status: DISPENSED
Start: 2021-10-07

## (undated) RX ORDER — LIDOCAINE HYDROCHLORIDE 20 MG/ML
INJECTION, SOLUTION EPIDURAL; INFILTRATION; INTRACAUDAL; PERINEURAL
Status: DISPENSED
Start: 2021-10-07

## (undated) RX ORDER — HYDROMORPHONE HYDROCHLORIDE 1 MG/ML
INJECTION, SOLUTION INTRAMUSCULAR; INTRAVENOUS; SUBCUTANEOUS
Status: DISPENSED
Start: 2021-10-07

## (undated) RX ORDER — LIDOCAINE HYDROCHLORIDE 10 MG/ML
INJECTION, SOLUTION INFILTRATION; PERINEURAL
Status: DISPENSED
Start: 2021-10-05

## (undated) RX ORDER — FENTANYL CITRATE 50 UG/ML
INJECTION, SOLUTION INTRAMUSCULAR; INTRAVENOUS
Status: DISPENSED
Start: 2021-11-12

## (undated) RX ORDER — NEOSTIGMINE METHYLSULFATE 1 MG/ML
VIAL (ML) INJECTION
Status: DISPENSED
Start: 2022-02-23

## (undated) RX ORDER — LIDOCAINE HYDROCHLORIDE 10 MG/ML
INJECTION, SOLUTION INFILTRATION; PERINEURAL
Status: DISPENSED
Start: 2021-10-06

## (undated) RX ORDER — ONDANSETRON 2 MG/ML
INJECTION INTRAMUSCULAR; INTRAVENOUS
Status: DISPENSED
Start: 2021-10-07

## (undated) RX ORDER — GLYCOPYRROLATE 0.2 MG/ML
INJECTION, SOLUTION INTRAMUSCULAR; INTRAVENOUS
Status: DISPENSED
Start: 2022-02-23

## (undated) RX ORDER — KETOROLAC TROMETHAMINE 30 MG/ML
INJECTION, SOLUTION INTRAMUSCULAR; INTRAVENOUS
Status: DISPENSED
Start: 2022-02-23

## (undated) RX ORDER — LIDOCAINE HYDROCHLORIDE 10 MG/ML
INJECTION, SOLUTION INFILTRATION; PERINEURAL
Status: DISPENSED
Start: 2021-11-12

## (undated) RX ORDER — FENTANYL CITRATE 50 UG/ML
INJECTION, SOLUTION INTRAMUSCULAR; INTRAVENOUS
Status: DISPENSED
Start: 2022-02-23

## (undated) RX ORDER — GLYCOPYRROLATE 0.2 MG/ML
INJECTION, SOLUTION INTRAMUSCULAR; INTRAVENOUS
Status: DISPENSED
Start: 2021-10-07